# Patient Record
Sex: MALE | Race: WHITE | HISPANIC OR LATINO | Employment: OTHER | URBAN - METROPOLITAN AREA
[De-identification: names, ages, dates, MRNs, and addresses within clinical notes are randomized per-mention and may not be internally consistent; named-entity substitution may affect disease eponyms.]

---

## 2017-08-24 RX ORDER — TRAMADOL HYDROCHLORIDE 50 MG/1
TABLET ORAL
Qty: 30 TABLET | Refills: 0 | Status: SHIPPED | OUTPATIENT
Start: 2017-08-24 | End: 2017-08-30 | Stop reason: SDUPTHER

## 2017-08-30 RX ORDER — TRAMADOL HYDROCHLORIDE 50 MG/1
TABLET ORAL
Qty: 30 TABLET | Refills: 0 | Status: SHIPPED | OUTPATIENT
Start: 2017-08-30 | End: 2017-09-05 | Stop reason: SDUPTHER

## 2017-09-06 RX ORDER — TRAMADOL HYDROCHLORIDE 50 MG/1
TABLET ORAL
Qty: 30 TABLET | Refills: 0 | Status: SHIPPED | OUTPATIENT
Start: 2017-09-06 | End: 2017-09-11 | Stop reason: SDUPTHER

## 2017-09-12 RX ORDER — TRAMADOL HYDROCHLORIDE 50 MG/1
TABLET ORAL
Qty: 30 TABLET | Refills: 1 | Status: SHIPPED | OUTPATIENT
Start: 2017-09-12 | End: 2017-09-22 | Stop reason: SDUPTHER

## 2017-09-22 ENCOUNTER — OFFICE VISIT (OUTPATIENT)
Dept: PRIMARY CARE CLINIC | Facility: CLINIC | Age: 67
End: 2017-09-22
Payer: MEDICARE

## 2017-09-22 VITALS
RESPIRATION RATE: 18 BRPM | WEIGHT: 181 LBS | DIASTOLIC BLOOD PRESSURE: 70 MMHG | BODY MASS INDEX: 28.41 KG/M2 | TEMPERATURE: 98 F | HEART RATE: 73 BPM | OXYGEN SATURATION: 98 % | HEIGHT: 67 IN | SYSTOLIC BLOOD PRESSURE: 126 MMHG

## 2017-09-22 DIAGNOSIS — E78.2 MIXED HYPERLIPIDEMIA: ICD-10-CM

## 2017-09-22 DIAGNOSIS — R73.03 BORDERLINE DIABETES: ICD-10-CM

## 2017-09-22 DIAGNOSIS — G44.219 EPISODIC TENSION-TYPE HEADACHE, NOT INTRACTABLE: ICD-10-CM

## 2017-09-22 DIAGNOSIS — K21.9 GASTROESOPHAGEAL REFLUX DISEASE, ESOPHAGITIS PRESENCE NOT SPECIFIED: ICD-10-CM

## 2017-09-22 DIAGNOSIS — M51.36 DDD (DEGENERATIVE DISC DISEASE), LUMBAR: Primary | ICD-10-CM

## 2017-09-22 DIAGNOSIS — I10 ESSENTIAL HYPERTENSION, BENIGN: ICD-10-CM

## 2017-09-22 DIAGNOSIS — Z23 NEED FOR VACCINATION: ICD-10-CM

## 2017-09-22 DIAGNOSIS — F32.A DEPRESSION, UNSPECIFIED DEPRESSION TYPE: ICD-10-CM

## 2017-09-22 PROBLEM — M51.369 DDD (DEGENERATIVE DISC DISEASE), LUMBAR: Status: ACTIVE | Noted: 2017-09-22

## 2017-09-22 PROBLEM — G89.21 CHRONIC PAIN DUE TO TRAUMA: Status: ACTIVE | Noted: 2017-09-22

## 2017-09-22 PROBLEM — M50.30 DDD (DEGENERATIVE DISC DISEASE), CERVICAL: Status: ACTIVE | Noted: 2017-09-22

## 2017-09-22 PROCEDURE — 99214 OFFICE O/P EST MOD 30 MIN: CPT | Mod: S$GLB,,, | Performed by: FAMILY MEDICINE

## 2017-09-22 PROCEDURE — 3008F BODY MASS INDEX DOCD: CPT | Mod: S$GLB,,, | Performed by: FAMILY MEDICINE

## 2017-09-22 PROCEDURE — 99999 PR PBB SHADOW E&M-EST. PATIENT-LVL III: CPT | Mod: PBBFAC,,, | Performed by: FAMILY MEDICINE

## 2017-09-22 RX ORDER — LISINOPRIL 10 MG/1
10 TABLET ORAL DAILY
Qty: 90 TABLET | Refills: 1 | Status: SHIPPED | OUTPATIENT
Start: 2017-09-22 | End: 2018-04-18 | Stop reason: SDUPTHER

## 2017-09-22 RX ORDER — BUTALBITAL, ACETAMINOPHEN AND CAFFEINE 300; 40; 50 MG/1; MG/1; MG/1
1 CAPSULE ORAL EVERY 6 HOURS PRN
COMMUNITY
End: 2017-09-22 | Stop reason: CLARIF

## 2017-09-22 RX ORDER — LISINOPRIL 10 MG/1
10 TABLET ORAL DAILY
COMMUNITY
End: 2017-09-22 | Stop reason: SDUPTHER

## 2017-09-22 RX ORDER — ESCITALOPRAM OXALATE 10 MG/1
10 TABLET ORAL DAILY
Qty: 90 TABLET | Refills: 1 | Status: SHIPPED | OUTPATIENT
Start: 2017-09-22 | End: 2018-04-18 | Stop reason: SDUPTHER

## 2017-09-22 RX ORDER — BUTALBITAL, ACETAMINOPHEN AND CAFFEINE 50; 325; 40 MG/1; MG/1; MG/1
1 TABLET ORAL EVERY 6 HOURS PRN
COMMUNITY
End: 2017-09-22 | Stop reason: SDUPTHER

## 2017-09-22 RX ORDER — OMEPRAZOLE 40 MG/1
40 CAPSULE, DELAYED RELEASE ORAL DAILY
COMMUNITY
End: 2017-09-22 | Stop reason: SDUPTHER

## 2017-09-22 RX ORDER — ESCITALOPRAM OXALATE 10 MG/1
10 TABLET ORAL DAILY
COMMUNITY
End: 2017-09-22 | Stop reason: SDUPTHER

## 2017-09-22 RX ORDER — OMEPRAZOLE 40 MG/1
40 CAPSULE, DELAYED RELEASE ORAL DAILY
Qty: 90 CAPSULE | Refills: 1 | Status: SHIPPED | OUTPATIENT
Start: 2017-09-22 | End: 2018-02-06 | Stop reason: SDUPTHER

## 2017-09-22 RX ORDER — TRAMADOL HYDROCHLORIDE 50 MG/1
50 TABLET ORAL EVERY 6 HOURS PRN
Qty: 120 TABLET | Refills: 5 | Status: SHIPPED | OUTPATIENT
Start: 2017-09-22 | End: 2018-03-09 | Stop reason: SDUPTHER

## 2017-09-22 RX ORDER — ROSUVASTATIN CALCIUM 10 MG/1
10 TABLET, COATED ORAL DAILY
COMMUNITY
End: 2017-09-22 | Stop reason: SDUPTHER

## 2017-09-22 RX ORDER — ROSUVASTATIN CALCIUM 10 MG/1
10 TABLET, COATED ORAL DAILY
Qty: 90 TABLET | Refills: 1 | Status: SHIPPED | OUTPATIENT
Start: 2017-09-22 | End: 2018-04-18 | Stop reason: SDUPTHER

## 2017-09-22 RX ORDER — BUTALBITAL, ACETAMINOPHEN AND CAFFEINE 300; 40; 50 MG/1; MG/1; MG/1
1 CAPSULE ORAL EVERY 6 HOURS PRN
Qty: 30 CAPSULE | Refills: 2 | Status: CANCELLED | OUTPATIENT
Start: 2017-09-22

## 2017-09-22 RX ORDER — BUTALBITAL, ACETAMINOPHEN AND CAFFEINE 50; 325; 40 MG/1; MG/1; MG/1
1 TABLET ORAL EVERY 6 HOURS PRN
Qty: 45 TABLET | Refills: 3 | Status: SHIPPED | OUTPATIENT
Start: 2017-09-22 | End: 2018-02-05 | Stop reason: SDUPTHER

## 2017-09-22 NOTE — PROGRESS NOTES
"Subjective:       Patient ID: Bay Yadav is a 66 y.o. male.    Chief Complaint: Medication Refill    Chronic neck and back pain s/p old work-related injury. Used to be on chronic opiates, has weaned down to tramadol. Stable on 3-4 tramadol a day, still sometimes takes Tylenol for breakthrough pain  Gets occasional tension HA's, takes prn Fioricet  HTN - BP well controlled  HLD - tolerating meds w/o difficulty      Review of Systems   Constitutional: Negative for chills, fatigue and fever.   HENT: Negative for congestion.    Eyes: Negative for visual disturbance.   Respiratory: Negative for cough and shortness of breath.    Cardiovascular: Negative for chest pain.   Gastrointestinal: Negative for abdominal pain, nausea and vomiting.   Genitourinary: Negative for difficulty urinating.   Musculoskeletal: Positive for arthralgias and back pain.   Skin: Negative for rash.   Neurological: Negative for dizziness.   Psychiatric/Behavioral: Negative for sleep disturbance.       Objective:      Vitals:    09/22/17 0815   BP: 126/70   BP Location: Left arm   Patient Position: Sitting   BP Method: Large (Automatic)   Pulse: 73   Resp: 18   Temp: 98.3 °F (36.8 °C)   TempSrc: Oral   SpO2: 98%   Weight: 82.1 kg (181 lb)   Height: 5' 7" (1.702 m)     Physical Exam   Constitutional: He is oriented to person, place, and time. He appears well-developed and well-nourished.   HENT:   Head: Normocephalic and atraumatic.   Cardiovascular: Normal rate, regular rhythm and normal heart sounds.    Pulmonary/Chest: Effort normal and breath sounds normal.   Musculoskeletal: He exhibits no edema.   Neurological: He is alert and oriented to person, place, and time.   Skin: Skin is warm and dry.   Vitals reviewed.      Assessment:       1. DDD (degenerative disc disease), lumbar    2. Essential hypertension, benign    3. Mixed hyperlipidemia    4. Borderline diabetes    5. Gastroesophageal reflux disease, esophagitis presence not specified  "   6. Depression, unspecified depression type    7. Episodic tension-type headache, not intractable    8. Need for vaccination        Plan:       DDD (degenerative disc disease), lumbar  -     tramadol (ULTRAM) 50 mg tablet; Take 1 tablet (50 mg total) by mouth every 6 (six) hours as needed.  Dispense: 120 tablet; Refill: 5    Essential hypertension, benign  -     lisinopril 10 MG tablet; Take 1 tablet (10 mg total) by mouth once daily.  Dispense: 90 tablet; Refill: 1  -     CBC auto differential; Future; Expected date: 03/22/2018    Mixed hyperlipidemia  -     rosuvastatin (CRESTOR) 10 MG tablet; Take 1 tablet (10 mg total) by mouth once daily.  Dispense: 90 tablet; Refill: 1  -     Comprehensive metabolic panel; Future; Expected date: 03/22/2018  -     Lipid panel; Future; Expected date: 03/22/2018    Borderline diabetes  -     Hemoglobin A1c; Future; Expected date: 03/22/2018    Gastroesophageal reflux disease, esophagitis presence not specified  -     omeprazole (PRILOSEC) 40 MG capsule; Take 1 capsule (40 mg total) by mouth once daily.  Dispense: 90 capsule; Refill: 1    Depression, unspecified depression type  -     escitalopram oxalate (LEXAPRO) 10 MG tablet; Take 1 tablet (10 mg total) by mouth once daily.  Dispense: 90 tablet; Refill: 1    Episodic tension-type headache, not intractable  -     butalbital-acetaminophen-caffeine -40 mg (FIORICET, ESGIC) -40 mg per tablet; Take 1 tablet by mouth every 6 (six) hours as needed for Headaches.  Dispense: 45 tablet; Refill: 3    Need for vaccination  Comments:  refused flu and pneumonia vaccines    Other orders  -     Cancel: butalbital-acetaminophen-caff -40 mg Cap; Take 1 capsule by mouth every 6 (six) hours as needed.  Dispense: 30 capsule; Refill: 2      Medication List with Changes/Refills   Changed and/or Refilled Medications    Modified Medication Previous Medication    BUTALBITAL-ACETAMINOPHEN-CAFFEINE -40 MG (FIORICET, ESGIC)  -40 MG PER TABLET butalbital-acetaminophen-caffeine -40 mg (FIORICET, ESGIC) -40 mg per tablet       Take 1 tablet by mouth every 6 (six) hours as needed for Headaches.    Take 1 tablet by mouth every 6 (six) hours as needed.    ESCITALOPRAM OXALATE (LEXAPRO) 10 MG TABLET escitalopram oxalate (LEXAPRO) 10 MG tablet       Take 1 tablet (10 mg total) by mouth once daily.    Take 10 mg by mouth once daily.    LISINOPRIL 10 MG TABLET lisinopril 10 MG tablet       Take 1 tablet (10 mg total) by mouth once daily.    Take 10 mg by mouth once daily.    OMEPRAZOLE (PRILOSEC) 40 MG CAPSULE omeprazole (PRILOSEC) 40 MG capsule       Take 1 capsule (40 mg total) by mouth once daily.    Take 40 mg by mouth once daily.    ROSUVASTATIN (CRESTOR) 10 MG TABLET rosuvastatin (CRESTOR) 10 MG tablet       Take 1 tablet (10 mg total) by mouth once daily.    Take 10 mg by mouth once daily.    TRAMADOL (ULTRAM) 50 MG TABLET tramadol (ULTRAM) 50 mg tablet       Take 1 tablet (50 mg total) by mouth every 6 (six) hours as needed.    TAKE 1 TABLET BY MOUTH EVERY 6 HOURS AS NEEDED FOR PAIN   Discontinued Medications    BUTALBITAL-ACETAMINOPHEN-CAFF -40 MG CAP    Take 1 capsule by mouth every 6 (six) hours as needed.

## 2017-09-25 RX ORDER — TRAMADOL HYDROCHLORIDE 50 MG/1
TABLET ORAL
Qty: 30 TABLET | Refills: 0 | OUTPATIENT
Start: 2017-09-25

## 2017-10-02 ENCOUNTER — TELEPHONE (OUTPATIENT)
Dept: PRIMARY CARE CLINIC | Facility: CLINIC | Age: 67
End: 2017-10-02

## 2017-10-02 ENCOUNTER — OFFICE VISIT (OUTPATIENT)
Dept: PRIMARY CARE CLINIC | Facility: CLINIC | Age: 67
End: 2017-10-02
Payer: MEDICARE

## 2017-10-02 VITALS
OXYGEN SATURATION: 99 % | HEART RATE: 80 BPM | TEMPERATURE: 98 F | HEIGHT: 67 IN | SYSTOLIC BLOOD PRESSURE: 131 MMHG | WEIGHT: 181 LBS | DIASTOLIC BLOOD PRESSURE: 58 MMHG | BODY MASS INDEX: 28.41 KG/M2 | RESPIRATION RATE: 18 BRPM

## 2017-10-02 DIAGNOSIS — J01.00 ACUTE NON-RECURRENT MAXILLARY SINUSITIS: Primary | ICD-10-CM

## 2017-10-02 PROCEDURE — 3008F BODY MASS INDEX DOCD: CPT | Mod: S$GLB,,, | Performed by: FAMILY MEDICINE

## 2017-10-02 PROCEDURE — 96372 THER/PROPH/DIAG INJ SC/IM: CPT | Mod: S$GLB,,, | Performed by: FAMILY MEDICINE

## 2017-10-02 PROCEDURE — 99213 OFFICE O/P EST LOW 20 MIN: CPT | Mod: 25,S$GLB,, | Performed by: FAMILY MEDICINE

## 2017-10-02 PROCEDURE — 99999 PR PBB SHADOW E&M-EST. PATIENT-LVL III: CPT | Mod: PBBFAC,,, | Performed by: FAMILY MEDICINE

## 2017-10-02 RX ORDER — BETAMETHASONE SODIUM PHOSPHATE AND BETAMETHASONE ACETATE 3; 3 MG/ML; MG/ML
12 INJECTION, SUSPENSION INTRA-ARTICULAR; INTRALESIONAL; INTRAMUSCULAR; SOFT TISSUE
Status: COMPLETED | OUTPATIENT
Start: 2017-10-02 | End: 2017-10-02

## 2017-10-02 RX ORDER — AMOXICILLIN AND CLAVULANATE POTASSIUM 875; 125 MG/1; MG/1
1 TABLET, FILM COATED ORAL EVERY 12 HOURS
Qty: 20 TABLET | Refills: 0 | Status: SHIPPED | OUTPATIENT
Start: 2017-10-02 | End: 2017-10-12

## 2017-10-02 RX ORDER — HYDROCODONE BITARTRATE AND HOMATROPINE METHYLBROMIDE ORAL SOLUTION 5; 1.5 MG/5ML; MG/5ML
5 LIQUID ORAL EVERY 6 HOURS PRN
Qty: 180 ML | Refills: 0 | Status: SHIPPED | OUTPATIENT
Start: 2017-10-02 | End: 2018-03-20

## 2017-10-02 RX ADMIN — BETAMETHASONE SODIUM PHOSPHATE AND BETAMETHASONE ACETATE 12 MG: 3; 3 INJECTION, SUSPENSION INTRA-ARTICULAR; INTRALESIONAL; INTRAMUSCULAR; SOFT TISSUE at 11:10

## 2017-10-02 NOTE — PROGRESS NOTES
"Subjective:       Patient ID: Bay Yadav is a 66 y.o. male.    Chief Complaint: Cough and Sore Throat    Sinusitis   This is a new problem. The current episode started 1 to 4 weeks ago. The problem is unchanged. The maximum temperature recorded prior to his arrival was 100.4 - 100.9 F. The fever has been present for less than 1 day. Associated symptoms include chills, congestion, coughing, headaches, sinus pressure and a sore throat. Pertinent negatives include no shortness of breath. Past treatments include oral decongestants. The treatment provided no relief.     Review of Systems   Constitutional: Positive for chills.   HENT: Positive for congestion, sinus pressure and sore throat.    Respiratory: Positive for cough. Negative for shortness of breath.    Neurological: Positive for headaches.       Objective:      Vitals:    10/02/17 0952   BP: (!) 131/58   BP Location: Left arm   Patient Position: Sitting   BP Method: Large (Automatic)   Pulse: 80   Resp: 18   Temp: 97.9 °F (36.6 °C)   TempSrc: Oral   SpO2: 99%   Weight: 82.1 kg (181 lb)   Height: 5' 7" (1.702 m)     Physical Exam   Constitutional: He is oriented to person, place, and time. He appears well-developed and well-nourished.   HENT:   Head: Normocephalic and atraumatic.   Right Ear: Tympanic membrane normal.   Left Ear: Tympanic membrane normal.   Nose: Right sinus exhibits maxillary sinus tenderness. Left sinus exhibits maxillary sinus tenderness.   Mouth/Throat: Oropharynx is clear and moist and mucous membranes are normal.   Cardiovascular: Normal rate, regular rhythm and normal heart sounds.    Pulmonary/Chest: Effort normal and breath sounds normal.   Musculoskeletal: He exhibits no edema.   Neurological: He is alert and oriented to person, place, and time.   Skin: Skin is warm and dry.   Vitals reviewed.      Assessment:       1. Acute non-recurrent maxillary sinusitis        Plan:       Acute non-recurrent maxillary sinusitis  -     " betamethasone acetate-betamethasone sodium phosphate injection 12 mg; Inject 2 mLs (12 mg total) into the muscle one time.  -     amoxicillin-clavulanate 875-125mg (AUGMENTIN) 875-125 mg per tablet; Take 1 tablet by mouth every 12 (twelve) hours.  Dispense: 20 tablet; Refill: 0  -     hydrocodone-homatropine 5-1.5 mg/5 ml (HYCODAN) 5-1.5 mg/5 mL Syrp; Take 5 mLs by mouth every 6 (six) hours as needed (cough).  Dispense: 180 mL; Refill: 0      Medication List with Changes/Refills   New Medications    AMOXICILLIN-CLAVULANATE 875-125MG (AUGMENTIN) 875-125 MG PER TABLET    Take 1 tablet by mouth every 12 (twelve) hours.    HYDROCODONE-HOMATROPINE 5-1.5 MG/5 ML (HYCODAN) 5-1.5 MG/5 ML SYRP    Take 5 mLs by mouth every 6 (six) hours as needed (cough).   Current Medications    BUTALBITAL-ACETAMINOPHEN-CAFFEINE -40 MG (FIORICET, ESGIC) -40 MG PER TABLET    Take 1 tablet by mouth every 6 (six) hours as needed for Headaches.    ESCITALOPRAM OXALATE (LEXAPRO) 10 MG TABLET    Take 1 tablet (10 mg total) by mouth once daily.    LISINOPRIL 10 MG TABLET    Take 1 tablet (10 mg total) by mouth once daily.    OMEPRAZOLE (PRILOSEC) 40 MG CAPSULE    Take 1 capsule (40 mg total) by mouth once daily.    ROSUVASTATIN (CRESTOR) 10 MG TABLET    Take 1 tablet (10 mg total) by mouth once daily.    TRAMADOL (ULTRAM) 50 MG TABLET    Take 1 tablet (50 mg total) by mouth every 6 (six) hours as needed.

## 2017-10-02 NOTE — TELEPHONE ENCOUNTER
----- Message from Yuly Solomon sent at 10/2/2017  8:25 AM CDT -----  Contact: Wife  Gabby, wife [phone], Calling for same day appointment, sore throat, coughing, nasal congestion. Please advise Thanks.

## 2017-10-02 NOTE — PROGRESS NOTES
Celestone 12mg IM given left gluteal w/aseptic technique.  No adverse reaction noted, tolerated well. Voiced no complaints.

## 2017-12-07 NOTE — TELEPHONE ENCOUNTER
----- Message from Yuly Roberts sent at 12/7/2017 12:51 PM CST -----  Call pt at [phone]  Asking for refill on meclozine 25 mgs   [facility] [id number] [address] [facility] [address] [address]  Phone: [phone] Fax: [phone]

## 2017-12-08 RX ORDER — MECLIZINE HYDROCHLORIDE 25 MG/1
25 TABLET ORAL 3 TIMES DAILY PRN
Qty: 30 TABLET | Refills: 2 | Status: SHIPPED | OUTPATIENT
Start: 2017-12-08 | End: 2019-10-27 | Stop reason: SDUPTHER

## 2018-02-05 DIAGNOSIS — G44.219 EPISODIC TENSION-TYPE HEADACHE, NOT INTRACTABLE: ICD-10-CM

## 2018-02-05 DIAGNOSIS — K21.9 GASTROESOPHAGEAL REFLUX DISEASE, ESOPHAGITIS PRESENCE NOT SPECIFIED: ICD-10-CM

## 2018-02-06 RX ORDER — OMEPRAZOLE 40 MG/1
40 CAPSULE, DELAYED RELEASE ORAL DAILY
Qty: 90 CAPSULE | Refills: 1 | Status: SHIPPED | OUTPATIENT
Start: 2018-02-06 | End: 2018-09-10 | Stop reason: SDUPTHER

## 2018-02-06 RX ORDER — BUTALBITAL, ACETAMINOPHEN AND CAFFEINE 50; 325; 40 MG/1; MG/1; MG/1
TABLET ORAL
Qty: 45 TABLET | Refills: 0 | Status: SHIPPED | OUTPATIENT
Start: 2018-02-06 | End: 2018-02-27 | Stop reason: SDUPTHER

## 2018-02-06 NOTE — TELEPHONE ENCOUNTER
----- Message from Jane Shanti sent at 2/6/2018 12:55 PM CST -----  Contact: Bay  Patient is requesting refill Rx omeprazole (PRILOSEC) 40 MG capsule 90-day supply to be sent to     [id number] - [place]  [address] AT [facility] [address]  [address] [address]  Phone: [phone] Fax: [phone]    Out of medication. Thanks!

## 2018-02-27 DIAGNOSIS — G44.219 EPISODIC TENSION-TYPE HEADACHE, NOT INTRACTABLE: ICD-10-CM

## 2018-02-27 RX ORDER — BUTALBITAL, ACETAMINOPHEN AND CAFFEINE 50; 325; 40 MG/1; MG/1; MG/1
TABLET ORAL
Qty: 45 TABLET | Refills: 2 | Status: SHIPPED | OUTPATIENT
Start: 2018-02-27 | End: 2018-09-30 | Stop reason: SDUPTHER

## 2018-03-09 DIAGNOSIS — M51.36 DDD (DEGENERATIVE DISC DISEASE), LUMBAR: ICD-10-CM

## 2018-03-12 RX ORDER — TRAMADOL HYDROCHLORIDE 50 MG/1
TABLET ORAL
Qty: 120 TABLET | Refills: 0 | Status: SHIPPED | OUTPATIENT
Start: 2018-03-12 | End: 2018-03-20 | Stop reason: SDUPTHER

## 2018-03-20 ENCOUNTER — OFFICE VISIT (OUTPATIENT)
Dept: PRIMARY CARE CLINIC | Facility: CLINIC | Age: 68
End: 2018-03-20
Payer: MEDICARE

## 2018-03-20 VITALS
WEIGHT: 178 LBS | RESPIRATION RATE: 18 BRPM | SYSTOLIC BLOOD PRESSURE: 114 MMHG | BODY MASS INDEX: 26.98 KG/M2 | TEMPERATURE: 98 F | DIASTOLIC BLOOD PRESSURE: 72 MMHG | OXYGEN SATURATION: 97 % | HEART RATE: 85 BPM | HEIGHT: 68 IN

## 2018-03-20 DIAGNOSIS — R73.03 BORDERLINE DIABETES: ICD-10-CM

## 2018-03-20 DIAGNOSIS — M19.012 OSTEOARTHRITIS OF GLENOHUMERAL JOINT, LEFT: Primary | ICD-10-CM

## 2018-03-20 DIAGNOSIS — M51.36 DDD (DEGENERATIVE DISC DISEASE), LUMBAR: ICD-10-CM

## 2018-03-20 DIAGNOSIS — E78.2 MIXED HYPERLIPIDEMIA: ICD-10-CM

## 2018-03-20 PROCEDURE — 99999 PR PBB SHADOW E&M-EST. PATIENT-LVL III: CPT | Mod: PBBFAC,,, | Performed by: FAMILY MEDICINE

## 2018-03-20 PROCEDURE — 3074F SYST BP LT 130 MM HG: CPT | Mod: S$GLB,,, | Performed by: FAMILY MEDICINE

## 2018-03-20 PROCEDURE — 20610 DRAIN/INJ JOINT/BURSA W/O US: CPT | Mod: LT,S$GLB,, | Performed by: FAMILY MEDICINE

## 2018-03-20 PROCEDURE — 99214 OFFICE O/P EST MOD 30 MIN: CPT | Mod: 25,S$GLB,, | Performed by: FAMILY MEDICINE

## 2018-03-20 PROCEDURE — 3078F DIAST BP <80 MM HG: CPT | Mod: S$GLB,,, | Performed by: FAMILY MEDICINE

## 2018-03-20 RX ORDER — TRAMADOL HYDROCHLORIDE 50 MG/1
50 TABLET ORAL EVERY 6 HOURS PRN
Qty: 120 TABLET | Refills: 2 | Status: SHIPPED | OUTPATIENT
Start: 2018-03-20 | End: 2018-07-01 | Stop reason: SDUPTHER

## 2018-03-20 RX ORDER — METHYLPREDNISOLONE ACETATE 40 MG/ML
40 INJECTION, SUSPENSION INTRA-ARTICULAR; INTRALESIONAL; INTRAMUSCULAR; SOFT TISSUE
Status: DISCONTINUED | OUTPATIENT
Start: 2018-03-20 | End: 2018-03-20 | Stop reason: HOSPADM

## 2018-03-20 RX ADMIN — METHYLPREDNISOLONE ACETATE 40 MG: 40 INJECTION, SUSPENSION INTRA-ARTICULAR; INTRALESIONAL; INTRAMUSCULAR; SOFT TISSUE at 05:03

## 2018-03-20 NOTE — PROCEDURES
Large Joint Aspiration/Injection  Date/Time: 3/20/2018 5:08 PM  Performed by: CULLEN AMANDA  Authorized by: CULLEN AMANDA     Consent Done?:  Yes (Verbal)  Indications:  Pain  Procedure site marked: Yes    Timeout: Prior to procedure the correct patient, procedure, and site was verified      Location:  Shoulder  Site:  L glenohumeral  Prep: Patient was prepped and draped in usual sterile fashion    Ultrasonic Guidance for needle placement: No  Needle size:  22 G  Approach:  Posterior  Medications:  40 mg methylPREDNISolone acetate 40 mg/mL  Patient tolerance:  Patient tolerated the procedure well with no immediate complications

## 2018-03-20 NOTE — PROGRESS NOTES
"Subjective:       Patient ID: Bay Yadav is a 67 y.o. male.    Chief Complaint: Follow-up (Patient says he is here for his 6 month check up and medication refill) and Shoulder Pain (Patient says he has had left shouler pain for months )    Left shoulder pain off and on x 3 years. Has never been formally evaluated, has just self-limited his activity, but lately pain getting worse, has hard time getting comfortable. Pain worse with overhead movements.  Recent labs remarkable for elevated TG's. Has been back to drinking regular root beer ~3 months ago, has been drinking ~3 per day (down from 6 previously)      Review of Systems   Constitutional: Negative for chills, fatigue and fever.   HENT: Negative for congestion.    Eyes: Negative for visual disturbance.   Respiratory: Negative for cough and shortness of breath.    Cardiovascular: Negative for chest pain.   Gastrointestinal: Negative for abdominal pain, nausea and vomiting.   Genitourinary: Negative for difficulty urinating.   Musculoskeletal: Positive for arthralgias and back pain.   Skin: Negative for rash.   Neurological: Negative for dizziness.   Psychiatric/Behavioral: Negative for sleep disturbance.       Objective:      Vitals:    03/20/18 1352   BP: 114/72   BP Location: Left arm   Patient Position: Sitting   BP Method: Large (Automatic)   Pulse: 85   Resp: 18   Temp: 98.2 °F (36.8 °C)   TempSrc: Oral   SpO2: 97%   Weight: 80.7 kg (178 lb)   Height: 5' 8" (1.727 m)     Lab Results   Component Value Date    WBC 7.20 03/13/2018    HGB 15.0 03/13/2018    HCT 44.3 03/13/2018     03/13/2018    CHOL 207 (H) 03/13/2018    TRIG 220 (H) 03/13/2018    HDL 61 (H) 03/13/2018    ALT 29 03/13/2018    AST 22 03/13/2018     03/13/2018    K 4.9 03/13/2018     03/13/2018    CREATININE 1.4 (H) 03/13/2018    BUN 17 03/13/2018    CO2 29 (H) 03/13/2018    INR 1.0 07/21/2006    HGBA1C 5.4 03/13/2018     Physical Exam   Constitutional: He is oriented to " person, place, and time. He appears well-developed and well-nourished.   HENT:   Head: Normocephalic and atraumatic.   Eyes: EOM are normal.   Neck: Neck supple. No JVD present.   Cardiovascular: Normal rate, regular rhythm and normal heart sounds.    Pulmonary/Chest: Effort normal and breath sounds normal.   Musculoskeletal: He exhibits no edema.        Left shoulder: He exhibits tenderness (posterolaterally), crepitus, pain and abnormal pulse. He exhibits no swelling, no effusion, no deformity and normal strength.   Neurological: He is alert and oriented to person, place, and time.   Skin: Skin is warm and dry.   Psychiatric: He has a normal mood and affect. His behavior is normal.   Nursing note and vitals reviewed.      Assessment:       1. Osteoarthritis of glenohumeral joint, left    2. DDD (degenerative disc disease), lumbar    3. Mixed hyperlipidemia    4. Borderline diabetes        Plan:       Osteoarthritis of glenohumeral joint, left  -     X-Ray Shoulder 2 or More Views Left; Future; Expected date: 03/20/2018  -     Large Joint Aspiration/Injection  -     methylPREDNISolone acetate injection 40 mg; Inject 40 mg into the articular space.    DDD (degenerative disc disease), lumbar  -     traMADol (ULTRAM) 50 mg tablet; Take 1 tablet (50 mg total) by mouth every 6 (six) hours as needed.  Dispense: 120 tablet; Refill: 2    Mixed hyperlipidemia  Comments:  TG's should improve with reduced carbohydrate intake  Orders:  -     Comprehensive metabolic panel; Future; Expected date: 06/20/2018  -     Lipid panel; Future; Expected date: 06/20/2018    Borderline diabetes  Comments:  stressed importance of cutting carb intake  Orders:  -     Hemoglobin A1c; Future; Expected date: 06/20/2018      Medication List with Changes/Refills   Current Medications    BUTALBITAL-ACETAMINOPHEN-CAFFEINE -40 MG (FIORICET, ESGIC) -40 MG PER TABLET    TAKE 1 TABLET BY MOUTH EVERY 6 HOURS AS NEEDED FOR HEADACHES     ESCITALOPRAM OXALATE (LEXAPRO) 10 MG TABLET    Take 1 tablet (10 mg total) by mouth once daily.    LISINOPRIL 10 MG TABLET    Take 1 tablet (10 mg total) by mouth once daily.    MECLIZINE (ANTIVERT) 25 MG TABLET    Take 1 tablet (25 mg total) by mouth 3 (three) times daily as needed.    OMEPRAZOLE (PRILOSEC) 40 MG CAPSULE    Take 1 capsule (40 mg total) by mouth once daily.    ROSUVASTATIN (CRESTOR) 10 MG TABLET    Take 1 tablet (10 mg total) by mouth once daily.   Changed and/or Refilled Medications    Modified Medication Previous Medication    TRAMADOL (ULTRAM) 50 MG TABLET traMADol (ULTRAM) 50 mg tablet       Take 1 tablet (50 mg total) by mouth every 6 (six) hours as needed.    TAKE 1 TABLET BY MOUTH EVERY 6 HOURS AS NEEDED   Discontinued Medications    HYDROCODONE-HOMATROPINE 5-1.5 MG/5 ML (HYCODAN) 5-1.5 MG/5 ML SYRP    Take 5 mLs by mouth every 6 (six) hours as needed (cough).

## 2018-04-18 DIAGNOSIS — I10 ESSENTIAL HYPERTENSION, BENIGN: ICD-10-CM

## 2018-04-18 DIAGNOSIS — F32.A DEPRESSION, UNSPECIFIED DEPRESSION TYPE: ICD-10-CM

## 2018-04-18 DIAGNOSIS — E78.2 MIXED HYPERLIPIDEMIA: ICD-10-CM

## 2018-04-19 RX ORDER — ESCITALOPRAM OXALATE 10 MG/1
TABLET ORAL
Qty: 90 TABLET | Refills: 3 | Status: SHIPPED | OUTPATIENT
Start: 2018-04-19 | End: 2019-04-19 | Stop reason: SDUPTHER

## 2018-04-19 RX ORDER — ROSUVASTATIN CALCIUM 10 MG/1
TABLET, COATED ORAL
Qty: 90 TABLET | Refills: 3 | Status: SHIPPED | OUTPATIENT
Start: 2018-04-19 | End: 2018-09-20

## 2018-04-19 RX ORDER — LISINOPRIL 10 MG/1
TABLET ORAL
Qty: 90 TABLET | Refills: 3 | Status: SHIPPED | OUTPATIENT
Start: 2018-04-19 | End: 2019-05-05 | Stop reason: SDUPTHER

## 2018-07-01 DIAGNOSIS — M51.36 DDD (DEGENERATIVE DISC DISEASE), LUMBAR: ICD-10-CM

## 2018-07-01 RX ORDER — TRAMADOL HYDROCHLORIDE 50 MG/1
TABLET ORAL
Qty: 120 TABLET | Refills: 1 | Status: SHIPPED | OUTPATIENT
Start: 2018-07-01 | End: 2018-08-30 | Stop reason: SDUPTHER

## 2018-08-30 DIAGNOSIS — M51.36 DDD (DEGENERATIVE DISC DISEASE), LUMBAR: ICD-10-CM

## 2018-08-31 RX ORDER — TRAMADOL HYDROCHLORIDE 50 MG/1
TABLET ORAL
Qty: 120 TABLET | Refills: 0 | Status: SHIPPED | OUTPATIENT
Start: 2018-08-31 | End: 2018-09-20 | Stop reason: SDUPTHER

## 2018-09-10 DIAGNOSIS — K21.9 GASTROESOPHAGEAL REFLUX DISEASE, ESOPHAGITIS PRESENCE NOT SPECIFIED: ICD-10-CM

## 2018-09-11 RX ORDER — OMEPRAZOLE 40 MG/1
CAPSULE, DELAYED RELEASE ORAL
Qty: 90 CAPSULE | Refills: 1 | Status: SHIPPED | OUTPATIENT
Start: 2018-09-11 | End: 2019-03-20 | Stop reason: SDUPTHER

## 2018-09-12 ENCOUNTER — TELEPHONE (OUTPATIENT)
Dept: PRIMARY CARE CLINIC | Facility: CLINIC | Age: 68
End: 2018-09-12

## 2018-09-12 NOTE — TELEPHONE ENCOUNTER
----- Message from Dionte Tillman sent at 9/12/2018 12:39 PM CDT -----  Type:  Patient Returning Call    Who Called:  Self Who Left Message for Patient:  CHRISTINA Does the patient know what this is regarding?:  CHRISTINA  Best Call Back Number:  [phone] Additional Information: CHRISTINA

## 2018-09-13 ENCOUNTER — CLINICAL SUPPORT (OUTPATIENT)
Dept: PRIMARY CARE CLINIC | Facility: CLINIC | Age: 68
End: 2018-09-13
Payer: MEDICARE

## 2018-09-13 DIAGNOSIS — E78.2 MIXED HYPERLIPIDEMIA: ICD-10-CM

## 2018-09-13 DIAGNOSIS — R73.03 BORDERLINE DIABETES: ICD-10-CM

## 2018-09-13 LAB
ALBUMIN SERPL BCP-MCNC: 4.2 G/DL
ALP SERPL-CCNC: 70 U/L
ALT SERPL W/O P-5'-P-CCNC: 31 U/L
ANION GAP SERPL CALC-SCNC: 7 MMOL/L
AST SERPL-CCNC: 24 U/L
BILIRUB SERPL-MCNC: 0.5 MG/DL
BUN SERPL-MCNC: 22 MG/DL
CALCIUM SERPL-MCNC: 9.4 MG/DL
CHLORIDE SERPL-SCNC: 105 MMOL/L
CHOLEST SERPL-MCNC: 192 MG/DL
CHOLEST/HDLC SERPL: 3.3 {RATIO}
CO2 SERPL-SCNC: 30 MMOL/L
CREAT SERPL-MCNC: 1.5 MG/DL
EST. GFR  (AFRICAN AMERICAN): 55.3 ML/MIN/1.73 M^2
EST. GFR  (NON AFRICAN AMERICAN): 47.8 ML/MIN/1.73 M^2
ESTIMATED AVG GLUCOSE: 108 MG/DL
GLUCOSE SERPL-MCNC: 103 MG/DL
HBA1C MFR BLD HPLC: 5.4 %
HDLC SERPL-MCNC: 58 MG/DL
HDLC SERPL: 30.2 %
LDLC SERPL CALC-MCNC: 90 MG/DL
NONHDLC SERPL-MCNC: 134 MG/DL
POTASSIUM SERPL-SCNC: 4.8 MMOL/L
PROT SERPL-MCNC: 7.2 G/DL
SODIUM SERPL-SCNC: 142 MMOL/L
TRIGL SERPL-MCNC: 218 MG/DL

## 2018-09-13 PROCEDURE — 83036 HEMOGLOBIN GLYCOSYLATED A1C: CPT

## 2018-09-13 PROCEDURE — 99999 PR PBB SHADOW E&M-EST. PATIENT-LVL I: CPT | Mod: PBBFAC,,,

## 2018-09-13 PROCEDURE — 99211 OFF/OP EST MAY X REQ PHY/QHP: CPT | Mod: PBBFAC,PN

## 2018-09-20 ENCOUNTER — OFFICE VISIT (OUTPATIENT)
Dept: PRIMARY CARE CLINIC | Facility: CLINIC | Age: 68
End: 2018-09-20
Payer: MEDICARE

## 2018-09-20 VITALS
SYSTOLIC BLOOD PRESSURE: 144 MMHG | OXYGEN SATURATION: 99 % | HEIGHT: 68 IN | RESPIRATION RATE: 16 BRPM | WEIGHT: 187 LBS | HEART RATE: 65 BPM | BODY MASS INDEX: 28.34 KG/M2 | DIASTOLIC BLOOD PRESSURE: 72 MMHG | TEMPERATURE: 98 F

## 2018-09-20 DIAGNOSIS — Z11.59 NEED FOR HEPATITIS C SCREENING TEST: ICD-10-CM

## 2018-09-20 DIAGNOSIS — Z12.11 COLON CANCER SCREENING: ICD-10-CM

## 2018-09-20 DIAGNOSIS — Z13.6 SCREENING FOR AAA (ABDOMINAL AORTIC ANEURYSM): ICD-10-CM

## 2018-09-20 DIAGNOSIS — M19.012 OSTEOARTHRITIS OF GLENOHUMERAL JOINT, LEFT: Primary | ICD-10-CM

## 2018-09-20 DIAGNOSIS — E78.2 MIXED HYPERLIPIDEMIA: ICD-10-CM

## 2018-09-20 DIAGNOSIS — Z23 NEED FOR VACCINATION: ICD-10-CM

## 2018-09-20 DIAGNOSIS — M51.36 DDD (DEGENERATIVE DISC DISEASE), LUMBAR: ICD-10-CM

## 2018-09-20 DIAGNOSIS — I10 ESSENTIAL HYPERTENSION, BENIGN: ICD-10-CM

## 2018-09-20 PROCEDURE — 99999 PR PBB SHADOW E&M-EST. PATIENT-LVL IV: CPT | Mod: PBBFAC,,, | Performed by: FAMILY MEDICINE

## 2018-09-20 PROCEDURE — 1101F PT FALLS ASSESS-DOCD LE1/YR: CPT | Mod: ,,, | Performed by: FAMILY MEDICINE

## 2018-09-20 PROCEDURE — 20605 DRAIN/INJ JOINT/BURSA W/O US: CPT | Mod: PBBFAC,PN | Performed by: FAMILY MEDICINE

## 2018-09-20 PROCEDURE — 99214 OFFICE O/P EST MOD 30 MIN: CPT | Mod: PBBFAC,PN | Performed by: FAMILY MEDICINE

## 2018-09-20 PROCEDURE — 99214 OFFICE O/P EST MOD 30 MIN: CPT | Mod: S$PBB,25,, | Performed by: FAMILY MEDICINE

## 2018-09-20 PROCEDURE — 3078F DIAST BP <80 MM HG: CPT | Mod: ,,, | Performed by: FAMILY MEDICINE

## 2018-09-20 PROCEDURE — 3077F SYST BP >= 140 MM HG: CPT | Mod: ,,, | Performed by: FAMILY MEDICINE

## 2018-09-20 RX ORDER — ROSUVASTATIN CALCIUM 20 MG/1
20 TABLET, COATED ORAL DAILY
Qty: 90 TABLET | Refills: 1 | Status: SHIPPED | OUTPATIENT
Start: 2018-09-20 | End: 2019-04-19 | Stop reason: SDUPTHER

## 2018-09-20 RX ORDER — METHYLPREDNISOLONE ACETATE 40 MG/ML
40 INJECTION, SUSPENSION INTRA-ARTICULAR; INTRALESIONAL; INTRAMUSCULAR; SOFT TISSUE
Status: DISCONTINUED | OUTPATIENT
Start: 2018-09-20 | End: 2018-09-20 | Stop reason: HOSPADM

## 2018-09-20 RX ORDER — TRAMADOL HYDROCHLORIDE 50 MG/1
50 TABLET ORAL EVERY 6 HOURS PRN
Qty: 120 TABLET | Refills: 3 | Status: SHIPPED | OUTPATIENT
Start: 2018-09-20 | End: 2019-01-22 | Stop reason: SDUPTHER

## 2018-09-20 RX ADMIN — METHYLPREDNISOLONE ACETATE 40 MG: 40 INJECTION, SUSPENSION INTRALESIONAL; INTRAMUSCULAR; INTRASYNOVIAL; SOFT TISSUE at 09:09

## 2018-09-20 NOTE — PROGRESS NOTES
"Subjective:       Patient ID: Bay Yadav is a 67 y.o. male.    Chief Complaint: Hyperlipidemia (patient is here to discuss lab results) and Shoulder Pain (patient wants a steriod injection in left shoulder )    Recent labs reviewed.  Triglycerides still elevated.  Compliant with medications, eating and drinking better than he had been previously.  Hypertension-blood pressure is generally well controlled, compliant with medications  Left shoulder arthritis worsening, requesting another cortisone injection.  Last shot approximately 6 months ago gave about 4 months of pain relief.      Review of Systems   Constitutional: Negative for fever.   HENT: Negative for trouble swallowing.    Eyes: Negative for visual disturbance.   Respiratory: Negative for shortness of breath.    Cardiovascular: Negative for chest pain.   Gastrointestinal: Negative for blood in stool, nausea and vomiting.   Genitourinary: Negative for difficulty urinating.   Musculoskeletal: Positive for arthralgias and back pain.   Skin: Negative for rash.   Neurological: Negative for dizziness and weakness.   Hematological: Does not bruise/bleed easily.   Psychiatric/Behavioral: Negative for agitation and confusion.       Objective:      Vitals:    09/20/18 0912   BP: (!) 144/72   BP Location: Left arm   Patient Position: Sitting   BP Method: Medium (Automatic)   Pulse: 65   Resp: 16   Temp: 98 °F (36.7 °C)   TempSrc: Oral   SpO2: 99%   Weight: 84.8 kg (187 lb)   Height: 5' 8" (1.727 m)     Physical Exam   Constitutional: He is oriented to person, place, and time. He appears well-developed and well-nourished.   HENT:   Head: Normocephalic and atraumatic.   Eyes: EOM are normal.   Neck: Neck supple. No JVD present.   Cardiovascular: Normal rate, regular rhythm and normal heart sounds.   Pulmonary/Chest: Effort normal and breath sounds normal.   Musculoskeletal: He exhibits no edema.        Left shoulder: He exhibits tenderness (posterolaterally), " crepitus and pain. He exhibits no swelling, no effusion, no deformity and normal strength.   Neurological: He is alert and oriented to person, place, and time.   Skin: Skin is warm and dry.   Psychiatric: He has a normal mood and affect. His behavior is normal.   Nursing note and vitals reviewed.      Assessment:       1. Osteoarthritis of glenohumeral joint, left    2. Mixed hyperlipidemia    3. Essential hypertension, benign    4. Colon cancer screening    5. Need for vaccination    6. Need for hepatitis C screening test    7. Screening for AAA (abdominal aortic aneurysm)    8. DDD (degenerative disc disease), lumbar        Plan:       Osteoarthritis of glenohumeral joint, left  -     Intermediate Joint Aspiration/Injection  -     methylPREDNISolone acetate injection 40 mg; Inject 40 mg into the articular space.    Mixed hyperlipidemia  Comments:  Suboptimally controlled, increase Crestor  Orders:  -     rosuvastatin (CRESTOR) 20 MG tablet; Take 1 tablet (20 mg total) by mouth once daily.  Dispense: 90 tablet; Refill: 1  -     Comprehensive metabolic panel; Future; Expected date: 12/20/2018  -     Lipid panel; Future; Expected date: 12/20/2018    Essential hypertension, benign  Comments:  Continue current meds for now    Colon cancer screening  Comments:  done last December  Orders:  -     Cancel: Ambulatory referral to Colorectal Surgery    Need for vaccination  Comments:  refuses all vaccinations  Orders:  -     Cancel: Pneumococcal Conjugate Vaccine (13 Valent) (IM)  -     Discontinue: varicella-zoster [id number], PF, (SHINGRIX, PF,) 50 mcg/0.5 mL injection; Inject 0.5 mLs into the muscle once. for 1 dose  Dispense: 0.5 mL; Refill: 0    Need for hepatitis C screening test  -     Hepatitis C antibody; Future; Expected date: 12/20/2018    Screening for AAA (abdominal aortic aneurysm)  -     US Abdominal Aorta; Future; Expected date: 09/20/2018    DDD (degenerative disc disease), lumbar  -     traMADol (ULTRAM) 50  mg tablet; Take 1 tablet (50 mg total) by mouth every 6 (six) hours as needed.  Dispense: 120 tablet; Refill: 3         Medication List           Accurate as of 9/20/18  9:45 AM. If you have any questions, ask your nurse or doctor.               CHANGE how you take these medications    rosuvastatin 20 MG tablet  Commonly known as:  CRESTOR  Take 1 tablet (20 mg total) by mouth once daily.  What changed:    · medication strength  · See the new instructions.  Changed by:  Wayne Brennan MD     traMADol 50 mg tablet  Commonly known as:  ULTRAM  Take 1 tablet (50 mg total) by mouth every 6 (six) hours as needed.  What changed:  See the new instructions.  Changed by:  Wayne Brennan MD        CONTINUE taking these medications    butalbital-acetaminophen-caffeine -40 mg -40 mg per tablet  Commonly known as:  FIORICET, ESGIC  TAKE 1 TABLET BY MOUTH EVERY 6 HOURS AS NEEDED FOR HEADACHES     escitalopram oxalate 10 MG tablet  Commonly known as:  LEXAPRO  TAKE 1 TABLET(10 MG) BY MOUTH EVERY DAY     lisinopril 10 MG tablet  TAKE 1 TABLET(10 MG) BY MOUTH EVERY DAY     meclizine 25 mg tablet  Commonly known as:  ANTIVERT  Take 1 tablet (25 mg total) by mouth 3 (three) times daily as needed.     omeprazole 40 MG capsule  Commonly known as:  PRILOSEC  TAKE 1 CAPSULE(40 MG) BY MOUTH EVERY DAY           Where to Get Your Medications      [id number]  [address] AT [facility] [address] [address]    Phone:  [phone]   · rosuvastatin 20 MG tablet  · traMADol 50 mg tablet

## 2018-09-20 NOTE — PROCEDURES
Intermediate Joint Aspiration/Injection  Date/Time: 9/20/2018 9:44 AM  Performed by: Wayne Brennan MD  Authorized by: Wayne Brennan MD     Consent Done?:  Yes (Verbal)  Indications:  Pain  Site marked: The procedure site was marked    Timeout: Prior to procedure the correct patient, procedure, and site was verified      Location:  Shoulder  Acromioclavicular joint: left glenohumeral joint.  Prep: Patient was prepped and draped in usual sterile fashion    Ultrasonic Guidance for needle placement: No  Needle size:  22 G  Approach:  Posterior  Medications:  40 mg methylPREDNISolone acetate 40 mg/mL  Patient tolerance:  Patient tolerated the procedure well with no immediate complications

## 2018-09-30 DIAGNOSIS — G44.219 EPISODIC TENSION-TYPE HEADACHE, NOT INTRACTABLE: ICD-10-CM

## 2018-10-01 RX ORDER — BUTALBITAL, ACETAMINOPHEN AND CAFFEINE 50; 325; 40 MG/1; MG/1; MG/1
TABLET ORAL
Qty: 45 TABLET | Refills: 3 | Status: SHIPPED | OUTPATIENT
Start: 2018-10-01 | End: 2019-01-28 | Stop reason: SDUPTHER

## 2018-11-09 ENCOUNTER — PES CALL (OUTPATIENT)
Dept: ADMINISTRATIVE | Facility: CLINIC | Age: 68
End: 2018-11-09

## 2019-01-22 DIAGNOSIS — M51.36 DDD (DEGENERATIVE DISC DISEASE), LUMBAR: ICD-10-CM

## 2019-01-22 RX ORDER — TRAMADOL HYDROCHLORIDE 50 MG/1
TABLET ORAL
Qty: 120 TABLET | Refills: 0 | Status: SHIPPED | OUTPATIENT
Start: 2019-01-22 | End: 2019-02-20 | Stop reason: SDUPTHER

## 2019-01-28 DIAGNOSIS — G44.219 EPISODIC TENSION-TYPE HEADACHE, NOT INTRACTABLE: ICD-10-CM

## 2019-01-28 RX ORDER — BUTALBITAL, ACETAMINOPHEN AND CAFFEINE 50; 325; 40 MG/1; MG/1; MG/1
TABLET ORAL
Qty: 45 TABLET | Refills: 1 | Status: SHIPPED | OUTPATIENT
Start: 2019-01-28 | End: 2019-03-19 | Stop reason: SDUPTHER

## 2019-02-20 DIAGNOSIS — M51.36 DDD (DEGENERATIVE DISC DISEASE), LUMBAR: ICD-10-CM

## 2019-02-20 RX ORDER — TRAMADOL HYDROCHLORIDE 50 MG/1
TABLET ORAL
Qty: 120 TABLET | Refills: 0 | Status: SHIPPED | OUTPATIENT
Start: 2019-02-20 | End: 2019-03-19 | Stop reason: SDUPTHER

## 2019-03-14 DIAGNOSIS — E87.5 HYPERKALEMIA: Primary | ICD-10-CM

## 2019-03-19 ENCOUNTER — CLINICAL SUPPORT (OUTPATIENT)
Dept: PRIMARY CARE CLINIC | Facility: CLINIC | Age: 69
End: 2019-03-19
Payer: MEDICARE

## 2019-03-19 ENCOUNTER — OFFICE VISIT (OUTPATIENT)
Dept: PRIMARY CARE CLINIC | Facility: CLINIC | Age: 69
End: 2019-03-19
Payer: MEDICARE

## 2019-03-19 VITALS
DIASTOLIC BLOOD PRESSURE: 74 MMHG | HEART RATE: 73 BPM | TEMPERATURE: 99 F | SYSTOLIC BLOOD PRESSURE: 138 MMHG | WEIGHT: 185.5 LBS | OXYGEN SATURATION: 97 % | HEIGHT: 67 IN | BODY MASS INDEX: 29.11 KG/M2 | RESPIRATION RATE: 18 BRPM

## 2019-03-19 DIAGNOSIS — Z13.6 ENCOUNTER FOR ABDOMINAL AORTIC ANEURYSM (AAA) SCREENING: ICD-10-CM

## 2019-03-19 DIAGNOSIS — E87.5 HYPERKALEMIA: ICD-10-CM

## 2019-03-19 DIAGNOSIS — G44.219 EPISODIC TENSION-TYPE HEADACHE, NOT INTRACTABLE: ICD-10-CM

## 2019-03-19 DIAGNOSIS — M51.36 DDD (DEGENERATIVE DISC DISEASE), LUMBAR: Primary | ICD-10-CM

## 2019-03-19 LAB
ANION GAP SERPL CALC-SCNC: 16 MMOL/L
BUN SERPL-MCNC: 12 MG/DL
CALCIUM SERPL-MCNC: 9.1 MG/DL
CHLORIDE SERPL-SCNC: 100 MMOL/L
CO2 SERPL-SCNC: 25 MMOL/L
CREAT SERPL-MCNC: 1.1 MG/DL
EST. GFR  (AFRICAN AMERICAN): >60 ML/MIN/1.73 M^2
EST. GFR  (NON AFRICAN AMERICAN): >60 ML/MIN/1.73 M^2
GLUCOSE SERPL-MCNC: 98 MG/DL
POTASSIUM SERPL-SCNC: 4.5 MMOL/L
SODIUM SERPL-SCNC: 141 MMOL/L

## 2019-03-19 PROCEDURE — 1101F PR PT FALLS ASSESS DOC 0-1 FALLS W/OUT INJ PAST YR: ICD-10-PCS | Mod: S$GLB,,, | Performed by: FAMILY MEDICINE

## 2019-03-19 PROCEDURE — 3075F SYST BP GE 130 - 139MM HG: CPT | Mod: S$GLB,,, | Performed by: FAMILY MEDICINE

## 2019-03-19 PROCEDURE — 80048 BASIC METABOLIC PNL TOTAL CA: CPT

## 2019-03-19 PROCEDURE — 93010 EKG 12-LEAD: ICD-10-PCS | Mod: S$GLB,,, | Performed by: INTERNAL MEDICINE

## 2019-03-19 PROCEDURE — 3078F PR MOST RECENT DIASTOLIC BLOOD PRESSURE < 80 MM HG: ICD-10-PCS | Mod: S$GLB,,, | Performed by: FAMILY MEDICINE

## 2019-03-19 PROCEDURE — 99214 PR OFFICE/OUTPT VISIT, EST, LEVL IV, 30-39 MIN: ICD-10-PCS | Mod: S$GLB,,, | Performed by: FAMILY MEDICINE

## 2019-03-19 PROCEDURE — 93005 ELECTROCARDIOGRAM TRACING: CPT | Mod: S$GLB,,, | Performed by: FAMILY MEDICINE

## 2019-03-19 PROCEDURE — 99999 PR PBB SHADOW E&M-EST. PATIENT-LVL IV: ICD-10-PCS | Mod: PBBFAC,,, | Performed by: FAMILY MEDICINE

## 2019-03-19 PROCEDURE — 3075F PR MOST RECENT SYSTOLIC BLOOD PRESS GE 130-139MM HG: ICD-10-PCS | Mod: S$GLB,,, | Performed by: FAMILY MEDICINE

## 2019-03-19 PROCEDURE — 3078F DIAST BP <80 MM HG: CPT | Mod: S$GLB,,, | Performed by: FAMILY MEDICINE

## 2019-03-19 PROCEDURE — 99214 OFFICE O/P EST MOD 30 MIN: CPT | Mod: S$GLB,,, | Performed by: FAMILY MEDICINE

## 2019-03-19 PROCEDURE — 93010 ELECTROCARDIOGRAM REPORT: CPT | Mod: S$GLB,,, | Performed by: INTERNAL MEDICINE

## 2019-03-19 PROCEDURE — 1101F PT FALLS ASSESS-DOCD LE1/YR: CPT | Mod: S$GLB,,, | Performed by: FAMILY MEDICINE

## 2019-03-19 PROCEDURE — 93005 EKG 12-LEAD: ICD-10-PCS | Mod: S$GLB,,, | Performed by: FAMILY MEDICINE

## 2019-03-19 PROCEDURE — 99999 PR PBB SHADOW E&M-EST. PATIENT-LVL IV: CPT | Mod: PBBFAC,,, | Performed by: FAMILY MEDICINE

## 2019-03-19 RX ORDER — HYDROCODONE BITARTRATE AND HOMATROPINE METHYLBROMIDE ORAL SOLUTION 5; 1.5 MG/5ML; MG/5ML
5 LIQUID ORAL EVERY 6 HOURS PRN
Qty: 120 ML | Refills: 0 | Status: SHIPPED | OUTPATIENT
Start: 2019-03-19 | End: 2020-08-10

## 2019-03-19 RX ORDER — TRAMADOL HYDROCHLORIDE 50 MG/1
50 TABLET ORAL EVERY 6 HOURS PRN
Qty: 120 TABLET | Refills: 2 | Status: SHIPPED | OUTPATIENT
Start: 2019-03-19 | End: 2019-06-13 | Stop reason: SDUPTHER

## 2019-03-19 RX ORDER — BUTALBITAL, ACETAMINOPHEN AND CAFFEINE 50; 325; 40 MG/1; MG/1; MG/1
TABLET ORAL
Qty: 45 TABLET | Refills: 2 | Status: SHIPPED | OUTPATIENT
Start: 2019-03-19 | End: 2019-05-05 | Stop reason: SDUPTHER

## 2019-03-19 NOTE — PROGRESS NOTES
"Subjective:       Patient ID: Bay Yadav is a 68 y.o. male.    Chief Complaint: Follow-up (6 month follow up)    Potassium slightly elevated on recent labs.  Not taking any potassium supplementation.  Chronic neck and lower back pain, stable on current regimen.  Occasional tension headaches, takes Fioricet as needed, no adverse side effects.      Review of Systems   Constitutional: Negative for chills, fatigue and fever.   HENT: Negative for congestion.    Eyes: Negative for visual disturbance.   Respiratory: Positive for cough. Negative for shortness of breath.    Cardiovascular: Negative for chest pain.   Gastrointestinal: Negative for abdominal pain, nausea and vomiting.   Genitourinary: Negative for difficulty urinating.   Musculoskeletal: Positive for arthralgias and back pain.   Skin: Negative for rash.   Allergic/Immunologic: Negative for immunocompromised state.   Neurological: Negative for dizziness.   Hematological: Does not bruise/bleed easily.   Psychiatric/Behavioral: Negative for sleep disturbance.       Objective:      Vitals:    03/19/19 1410   BP: 138/74   BP Location: Left arm   Patient Position: Sitting   BP Method: Medium (Automatic)   Pulse: 73   Resp: 18   Temp: 98.5 °F (36.9 °C)   TempSrc: Oral   SpO2: 97%   Weight: 84.1 kg (185 lb 8 oz)   Height: 5' 7" (1.702 m)     Physical Exam   Constitutional: He is oriented to person, place, and time. He appears well-developed and well-nourished.   HENT:   Head: Normocephalic and atraumatic.   Cardiovascular: Normal rate, regular rhythm and normal heart sounds.   Pulmonary/Chest: Effort normal and breath sounds normal.   Musculoskeletal: He exhibits no edema.        Lumbar back: He exhibits decreased range of motion. He exhibits no bony tenderness and no deformity.   Neurological: He is alert and oriented to person, place, and time.   Skin: Skin is warm and dry.   Nursing note and vitals reviewed.      Assessment:       1. DDD (degenerative disc " disease), lumbar    2. Episodic tension-type headache, not intractable    3. Encounter for abdominal aortic aneurysm (AAA) screening    4. Hyperkalemia        Plan:       DDD (degenerative disc disease), lumbar  -     traMADol (ULTRAM) 50 mg tablet; Take 1 tablet (50 mg total) by mouth every 6 (six) hours as needed.  Dispense: 120 tablet; Refill: 2  Stable  Episodic tension-type headache, not intractable  -     butalbital-acetaminophen-caffeine -40 mg (FIORICET, ESGIC) -40 mg per tablet; TAKE 1 TABLET BY MOUTH EVERY 6 HOURS AS NEEDED FOR HEADACHES  Dispense: 45 tablet; Refill: 2  Stable  Encounter for abdominal aortic aneurysm (AAA) screening  -     US Abdominal Aorta; Future; Expected date: 03/19/2019    Hyperkalemia  No acute EKG changes  Other orders  -     hydrocodone-homatropine 5-1.5 mg/5 ml (HYCODAN) 5-1.5 mg/5 mL Syrp; Take 5 mLs by mouth every 6 (six) hours as needed (cough).  Dispense: 120 mL; Refill: 0      Medication List with Changes/Refills   Current Medications    ESCITALOPRAM OXALATE (LEXAPRO) 10 MG TABLET    TAKE 1 TABLET(10 MG) BY MOUTH EVERY DAY    LISINOPRIL 10 MG TABLET    TAKE 1 TABLET(10 MG) BY MOUTH EVERY DAY    MECLIZINE (ANTIVERT) 25 MG TABLET    Take 1 tablet (25 mg total) by mouth 3 (three) times daily as needed.    OMEPRAZOLE (PRILOSEC) 40 MG CAPSULE    TAKE 1 CAPSULE(40 MG) BY MOUTH EVERY DAY    ROSUVASTATIN (CRESTOR) 20 MG TABLET    Take 1 tablet (20 mg total) by mouth once daily.   Changed and/or Refilled Medications    Modified Medication Previous Medication    BUTALBITAL-ACETAMINOPHEN-CAFFEINE -40 MG (FIORICET, ESGIC) -40 MG PER TABLET butalbital-acetaminophen-caffeine -40 mg (FIORICET, ESGIC) -40 mg per tablet       TAKE 1 TABLET BY MOUTH EVERY 6 HOURS AS NEEDED FOR HEADACHES    TAKE 1 TABLET BY MOUTH EVERY 6 HOURS AS NEEDED FOR HEADACHES    HYDROCODONE-HOMATROPINE 5-1.5 MG/5 ML (HYCODAN) 5-1.5 MG/5 ML SYRP hydrocodone-homatropine 5-1.5 mg/5 ml  (HYCODAN) 5-1.5 mg/5 mL Syrp       Take 5 mLs by mouth every 6 (six) hours as needed (cough).    Take 5 mLs by mouth every 6 (six) hours as needed (cough).    TRAMADOL (ULTRAM) 50 MG TABLET traMADol (ULTRAM) 50 mg tablet       Take 1 tablet (50 mg total) by mouth every 6 (six) hours as needed.    TAKE 1 TABLET BY MOUTH EVERY 6 HOURS AS NEEDED FOR PAIN

## 2019-03-20 DIAGNOSIS — K21.9 GASTROESOPHAGEAL REFLUX DISEASE, ESOPHAGITIS PRESENCE NOT SPECIFIED: ICD-10-CM

## 2019-03-20 RX ORDER — OMEPRAZOLE 40 MG/1
CAPSULE, DELAYED RELEASE ORAL
Qty: 90 CAPSULE | Refills: 1 | Status: SHIPPED | OUTPATIENT
Start: 2019-03-20 | End: 2019-10-27 | Stop reason: SDUPTHER

## 2019-03-26 PROBLEM — I70.0 ATHEROSCLEROSIS OF ABDOMINAL AORTA: Status: ACTIVE | Noted: 2019-03-26

## 2019-04-19 DIAGNOSIS — E78.2 MIXED HYPERLIPIDEMIA: ICD-10-CM

## 2019-04-19 DIAGNOSIS — F32.A DEPRESSION, UNSPECIFIED DEPRESSION TYPE: ICD-10-CM

## 2019-04-22 RX ORDER — ESCITALOPRAM OXALATE 10 MG/1
TABLET ORAL
Qty: 90 TABLET | Refills: 3 | Status: SHIPPED | OUTPATIENT
Start: 2019-04-22 | End: 2020-03-27

## 2019-04-22 RX ORDER — ROSUVASTATIN CALCIUM 20 MG/1
TABLET, COATED ORAL
Qty: 90 TABLET | Refills: 3 | Status: SHIPPED | OUTPATIENT
Start: 2019-04-22 | End: 2020-03-27

## 2019-05-05 DIAGNOSIS — I10 ESSENTIAL HYPERTENSION, BENIGN: ICD-10-CM

## 2019-05-05 DIAGNOSIS — G44.219 EPISODIC TENSION-TYPE HEADACHE, NOT INTRACTABLE: ICD-10-CM

## 2019-05-06 RX ORDER — BUTALBITAL, ACETAMINOPHEN AND CAFFEINE 50; 325; 40 MG/1; MG/1; MG/1
TABLET ORAL
Qty: 45 TABLET | Refills: 1 | Status: SHIPPED | OUTPATIENT
Start: 2019-05-06 | End: 2019-06-20 | Stop reason: SDUPTHER

## 2019-05-06 RX ORDER — LISINOPRIL 10 MG/1
TABLET ORAL
Qty: 90 TABLET | Refills: 1 | Status: SHIPPED | OUTPATIENT
Start: 2019-05-06 | End: 2019-11-07 | Stop reason: SDUPTHER

## 2019-06-13 DIAGNOSIS — M51.36 DDD (DEGENERATIVE DISC DISEASE), LUMBAR: ICD-10-CM

## 2019-06-15 RX ORDER — TRAMADOL HYDROCHLORIDE 50 MG/1
TABLET ORAL
Qty: 120 TABLET | Refills: 1 | Status: SHIPPED | OUTPATIENT
Start: 2019-06-15 | End: 2019-08-14 | Stop reason: SDUPTHER

## 2019-06-20 DIAGNOSIS — G44.219 EPISODIC TENSION-TYPE HEADACHE, NOT INTRACTABLE: ICD-10-CM

## 2019-06-21 RX ORDER — BUTALBITAL, ACETAMINOPHEN AND CAFFEINE 50; 325; 40 MG/1; MG/1; MG/1
TABLET ORAL
Qty: 45 TABLET | Refills: 1 | Status: SHIPPED | OUTPATIENT
Start: 2019-06-21 | End: 2019-07-28 | Stop reason: SDUPTHER

## 2019-07-28 DIAGNOSIS — G44.219 EPISODIC TENSION-TYPE HEADACHE, NOT INTRACTABLE: ICD-10-CM

## 2019-07-29 RX ORDER — BUTALBITAL, ACETAMINOPHEN AND CAFFEINE 50; 325; 40 MG/1; MG/1; MG/1
TABLET ORAL
Qty: 45 TABLET | Refills: 1 | Status: SHIPPED | OUTPATIENT
Start: 2019-07-29 | End: 2019-09-24 | Stop reason: SDUPTHER

## 2019-08-14 DIAGNOSIS — M51.36 DDD (DEGENERATIVE DISC DISEASE), LUMBAR: ICD-10-CM

## 2019-08-14 RX ORDER — TRAMADOL HYDROCHLORIDE 50 MG/1
TABLET ORAL
Qty: 120 TABLET | Refills: 0 | Status: SHIPPED | OUTPATIENT
Start: 2019-08-14 | End: 2019-09-12 | Stop reason: SDUPTHER

## 2019-09-12 DIAGNOSIS — M51.36 DDD (DEGENERATIVE DISC DISEASE), LUMBAR: ICD-10-CM

## 2019-09-12 RX ORDER — TRAMADOL HYDROCHLORIDE 50 MG/1
TABLET ORAL
Qty: 120 TABLET | Refills: 0 | Status: SHIPPED | OUTPATIENT
Start: 2019-09-12 | End: 2019-10-10 | Stop reason: SDUPTHER

## 2019-09-17 ENCOUNTER — CLINICAL SUPPORT (OUTPATIENT)
Dept: PRIMARY CARE CLINIC | Facility: CLINIC | Age: 69
End: 2019-09-17
Payer: MEDICARE

## 2019-09-17 ENCOUNTER — OFFICE VISIT (OUTPATIENT)
Dept: UROLOGY | Facility: CLINIC | Age: 69
End: 2019-09-17
Payer: MEDICARE

## 2019-09-17 VITALS
DIASTOLIC BLOOD PRESSURE: 77 MMHG | BODY MASS INDEX: 27.19 KG/M2 | HEIGHT: 68 IN | HEART RATE: 79 BPM | SYSTOLIC BLOOD PRESSURE: 113 MMHG | WEIGHT: 179.44 LBS

## 2019-09-17 DIAGNOSIS — E87.5 HYPERKALEMIA: Primary | ICD-10-CM

## 2019-09-17 DIAGNOSIS — I10 ESSENTIAL HYPERTENSION, BENIGN: ICD-10-CM

## 2019-09-17 DIAGNOSIS — N20.0 NEPHROLITHIASIS: ICD-10-CM

## 2019-09-17 DIAGNOSIS — R10.2 PELVIC PAIN: Primary | ICD-10-CM

## 2019-09-17 DIAGNOSIS — R73.03 BORDERLINE DIABETES: ICD-10-CM

## 2019-09-17 DIAGNOSIS — C61 CANCER OF PROSTATE WITH INTERMEDIATE RECURRENCE RISK (STAGE T2B-C OR GLEASON 7 OR PSA 10-20): ICD-10-CM

## 2019-09-17 DIAGNOSIS — E78.2 MIXED HYPERLIPIDEMIA: ICD-10-CM

## 2019-09-17 LAB
ALBUMIN SERPL BCP-MCNC: 4.3 G/DL (ref 3.5–5.2)
ALP SERPL-CCNC: 76 U/L (ref 38–126)
ALT SERPL W/O P-5'-P-CCNC: 34 U/L (ref 17–63)
ANION GAP SERPL CALC-SCNC: 8 MMOL/L (ref 8–16)
AST SERPL-CCNC: 23 U/L (ref 15–41)
BASOPHILS # BLD AUTO: 0 K/UL (ref 0–0.2)
BASOPHILS NFR BLD: 0.4 % (ref 0–1.9)
BILIRUB SERPL-MCNC: 0.5 MG/DL (ref 0.3–1.2)
BUN SERPL-MCNC: 16 MG/DL (ref 8–23)
CALCIUM SERPL-MCNC: 9.3 MG/DL (ref 8.6–10)
CHLORIDE SERPL-SCNC: 102 MMOL/L (ref 101–111)
CHOLEST SERPL-MCNC: 162 MG/DL (ref 80–200)
CHOLEST/HDLC SERPL: 2.7 {RATIO} (ref 2–5)
CO2 SERPL-SCNC: 29 MMOL/L (ref 23–29)
CREAT SERPL-MCNC: 1.3 MG/DL (ref 0.5–1.4)
DIFFERENTIAL METHOD: ABNORMAL
EOSINOPHIL # BLD AUTO: 0.1 K/UL (ref 0–0.5)
EOSINOPHIL NFR BLD: 1.1 % (ref 0–8)
ERYTHROCYTE [DISTWIDTH] IN BLOOD BY AUTOMATED COUNT: 13.6 % (ref 11.5–14.5)
EST. GFR  (AFRICAN AMERICAN): >60 ML/MIN/1.73 M^2
EST. GFR  (NON AFRICAN AMERICAN): 56.5 ML/MIN/1.73 M^2
ESTIMATED AVG GLUCOSE: 108 MG/DL (ref 68–131)
GLUCOSE SERPL-MCNC: 102 MG/DL (ref 74–118)
HBA1C MFR BLD HPLC: 5.4 % (ref 4–5.6)
HCT VFR BLD AUTO: 41.1 % (ref 40–54)
HDLC SERPL-MCNC: 61 MG/DL (ref 40–75)
HDLC SERPL: 37.7 % (ref 20–50)
HGB BLD-MCNC: 13.5 G/DL (ref 14–18)
LDLC SERPL CALC-MCNC: 76 MG/DL
LYMPHOCYTES # BLD AUTO: 2.2 K/UL (ref 1–4.8)
LYMPHOCYTES NFR BLD: 23.2 % (ref 18–48)
MCH RBC QN AUTO: 29.7 PG (ref 27–31)
MCHC RBC AUTO-ENTMCNC: 32.9 G/DL (ref 32–36)
MCV RBC AUTO: 90 FL (ref 82–98)
MONOCYTES # BLD AUTO: 0.7 K/UL (ref 0.3–1)
MONOCYTES NFR BLD: 7.1 % (ref 4–15)
NEUTROPHILS # BLD AUTO: 6.6 K/UL (ref 1.8–7.7)
NEUTROPHILS NFR BLD: 68.2 % (ref 38–73)
NONHDLC SERPL-MCNC: 101 MG/DL
PLATELET # BLD AUTO: 287 K/UL (ref 150–350)
PMV BLD AUTO: 8.8 FL (ref 9.2–12.9)
POTASSIUM SERPL-SCNC: 4.3 MMOL/L (ref 3.5–5.1)
PROT SERPL-MCNC: 7.5 G/DL (ref 6–8.4)
RBC # BLD AUTO: 4.55 M/UL (ref 4.6–6.2)
SODIUM SERPL-SCNC: 139 MMOL/L (ref 136–145)
TRIGL SERPL-MCNC: 127 MG/DL (ref 30–150)
WBC # BLD AUTO: 9.7 K/UL (ref 3.9–12.7)

## 2019-09-17 PROCEDURE — 80053 COMPREHEN METABOLIC PANEL: CPT

## 2019-09-17 PROCEDURE — 3078F PR MOST RECENT DIASTOLIC BLOOD PRESSURE < 80 MM HG: ICD-10-PCS | Mod: S$GLB,,, | Performed by: UROLOGY

## 2019-09-17 PROCEDURE — 1101F PR PT FALLS ASSESS DOC 0-1 FALLS W/OUT INJ PAST YR: ICD-10-PCS | Mod: S$GLB,,, | Performed by: UROLOGY

## 2019-09-17 PROCEDURE — 36415 COLL VENOUS BLD VENIPUNCTURE: CPT | Mod: S$GLB,,, | Performed by: FAMILY MEDICINE

## 2019-09-17 PROCEDURE — 83036 HEMOGLOBIN GLYCOSYLATED A1C: CPT

## 2019-09-17 PROCEDURE — 87798 DETECT AGENT NOS DNA AMP: CPT | Mod: 91

## 2019-09-17 PROCEDURE — 87077 CULTURE AEROBIC IDENTIFY: CPT

## 2019-09-17 PROCEDURE — 80061 LIPID PANEL: CPT

## 2019-09-17 PROCEDURE — 3074F SYST BP LT 130 MM HG: CPT | Mod: S$GLB,,, | Performed by: UROLOGY

## 2019-09-17 PROCEDURE — 99999 PR PBB SHADOW E&M-EST. PATIENT-LVL III: CPT | Mod: PBBFAC,,, | Performed by: UROLOGY

## 2019-09-17 PROCEDURE — 85025 COMPLETE CBC W/AUTO DIFF WBC: CPT

## 2019-09-17 PROCEDURE — 99999 PR PBB SHADOW E&M-EST. PATIENT-LVL III: ICD-10-PCS | Mod: PBBFAC,,, | Performed by: UROLOGY

## 2019-09-17 PROCEDURE — 87088 URINE BACTERIA CULTURE: CPT

## 2019-09-17 PROCEDURE — 3074F PR MOST RECENT SYSTOLIC BLOOD PRESSURE < 130 MM HG: ICD-10-PCS | Mod: S$GLB,,, | Performed by: UROLOGY

## 2019-09-17 PROCEDURE — 36415 PR COLLECTION VENOUS BLOOD,VENIPUNCTURE: ICD-10-PCS | Mod: S$GLB,,, | Performed by: FAMILY MEDICINE

## 2019-09-17 PROCEDURE — 99205 PR OFFICE/OUTPT VISIT, NEW, LEVL V, 60-74 MIN: ICD-10-PCS | Mod: S$GLB,,, | Performed by: UROLOGY

## 2019-09-17 PROCEDURE — 87186 SC STD MICRODIL/AGAR DIL: CPT

## 2019-09-17 PROCEDURE — 3078F DIAST BP <80 MM HG: CPT | Mod: S$GLB,,, | Performed by: UROLOGY

## 2019-09-17 PROCEDURE — 87798 DETECT AGENT NOS DNA AMP: CPT

## 2019-09-17 PROCEDURE — 99205 OFFICE O/P NEW HI 60 MIN: CPT | Mod: S$GLB,,, | Performed by: UROLOGY

## 2019-09-17 PROCEDURE — 1101F PT FALLS ASSESS-DOCD LE1/YR: CPT | Mod: S$GLB,,, | Performed by: UROLOGY

## 2019-09-17 PROCEDURE — 87086 URINE CULTURE/COLONY COUNT: CPT

## 2019-09-17 NOTE — PROGRESS NOTES
Subjective:       Patient ID: Bay Yadav is a 68 y.o. male.    Chief Complaint: Prostate Cancer (with pain in the groin and penis. Patient has records. Patient is here to estalish care.) and Nephrolithiasis (history of kidney stones)    Bay Yadav is a 68 y.o. Male here for discussion of prostate cancer and urinary issues.     8/21/19 prostate biopsy showed   Left base/Left mid/Left apex soni 6, 5% focal perineural invasion  Right base/right mid was negative  Right apex 3+4=7, 2 cores 50%, grade 2. Focal perineural invasion    psa 2.71  psa 3.35    He has testicular pain. Pain is severe.   Long history of kidney stones. Ultrasound a couple of years ago.   He has urinary issues. He had a cystoscopy 1.5 years ago.   Has some chronic pelvic pain. Pain worse with urination, not relieved with urination.   He has a split urinary stream, intermittency, hesitancy.   Nocturia x 2. He does drink coffee overnight.   Urinary frequency every 2 hours.   No incontinence.   Was put on flomax and he stopped it.   He told him to take azo.   No gross hematuria.     He was given antibiotics twice for 2 weeks.     H/o peyronies and previous injections    He drinks a lot of coffee.     No weight loss.     No family hx of prostate cancer.   Brother has brain cancer.   19 yo granddaughter had thyroid cancer.   Father and brother had throat cancer.   Mother had lung cancer.   Grandmother and grandfather on paternal side had colon cancer.     Has polyps on last colonoscopy. Due 12/20 for repeat colonoscopy.     Has erections, but is sexually active.           Past Surgical History:   Procedure Laterality Date    EYE SURGERY         Past Medical History:   Diagnosis Date    Acid reflux     Cervical disc herniation     Depression     Heart murmur     Hyperlipidemia     Lumbar disc herniation     Nephrolithiasis     Upper GI bleed        Social History     Socioeconomic History    Marital status:      Spouse  name: Not on file    Number of children: Not on file    Years of education: Not on file    Highest education level: Not on file   Occupational History    Not on file   Social Needs    Financial resource strain: Not on file    Food insecurity:     Worry: Not on file     Inability: Not on file    Transportation needs:     Medical: Not on file     Non-medical: Not on file   Tobacco Use    Smoking status: Former Smoker    Smokeless tobacco: Never Used   Substance and Sexual Activity    Alcohol use: No    Drug use: No    Sexual activity: Not on file   Lifestyle    Physical activity:     Days per week: Not on file     Minutes per session: Not on file    Stress: Not on file   Relationships    Social connections:     Talks on phone: Not on file     Gets together: Not on file     Attends [employer] service: Not on file     Active member of club or organization: Not on file     Attends meetings of clubs or organizations: Not on file     Relationship status: Not on file   Other Topics Concern    Not on file   Social History Narrative    Not on file       Family History   Problem Relation Age of Onset    Cancer Mother     Cancer Father     Cancer Brother        Current Outpatient Medications   Medication Sig Dispense Refill    butalbital-acetaminophen-caffeine -40 mg (FIORICET, ESGIC) -40 mg per tablet TAKE 1 TABLET BY MOUTH EVERY 6 HOURS AS NEEDED FOR HEADACHES 45 tablet 1    escitalopram oxalate (LEXAPRO) 10 MG tablet TAKE 1 TABLET(10 MG) BY MOUTH EVERY DAY 90 tablet 3    lisinopril 10 MG tablet TAKE 1 TABLET(10 MG) BY MOUTH EVERY DAY 90 tablet 1    meclizine (ANTIVERT) 25 mg tablet Take 1 tablet (25 mg total) by mouth 3 (three) times daily as needed. 30 tablet 2    omeprazole (PRILOSEC) 40 MG capsule TAKE 1 CAPSULE(40 MG) BY MOUTH EVERY DAY 90 capsule 1    rosuvastatin (CRESTOR) 20 MG tablet TAKE 1 TABLET(20 MG) BY MOUTH EVERY DAY 90 tablet 3    traMADol (ULTRAM) 50 mg tablet TAKE 1  TABLET BY MOUTH EVERY 6 HOURS AS NEEDED 120 tablet 0    vitB6/mag cit,ox/potassium cit (THERALITH XR ORAL) Take by mouth.      hydrocodone-homatropine 5-1.5 mg/5 ml (HYCODAN) 5-1.5 mg/5 mL Syrp Take 5 mLs by mouth every 6 (six) hours as needed (cough). 120 mL 0     No current facility-administered medications for this visit.        Review of patient's allergies indicates:   Allergen Reactions    Asa [aspirin]      Gastro bleed        BMP  Lab Results   Component Value Date     09/17/2019    K 4.3 09/17/2019     09/17/2019    CO2 29 09/17/2019    BUN 16 09/17/2019    CREATININE 1.3 09/17/2019    CALCIUM 9.3 09/17/2019    ANIONGAP 8 09/17/2019    ESTGFRAFRICA >60.0 09/17/2019    EGFRNONAA 56.5 (A) 09/17/2019       Review of Systems   Constitutional: Negative for chills, fatigue, fever and unexpected weight change.   HENT: Negative for hearing loss and nosebleeds.    Eyes: Negative for visual disturbance.   Respiratory: Negative for chest tightness and shortness of breath.    Cardiovascular: Negative for chest pain.   Gastrointestinal: Negative for diarrhea.   Genitourinary: Positive for dysuria, frequency, nocturia and testicular pain. Negative for hematuria and urgency.   Musculoskeletal: Negative for joint swelling.   Skin: Negative for rash.   Neurological: Negative for seizures.   Hematological: Does not bruise/bleed easily.   Psychiatric/Behavioral: Negative for behavioral problems.     Objective:      Physical Exam   Constitutional: He is oriented to person, place, and time. He appears well-developed and well-nourished.   HENT:   Head: Normocephalic and atraumatic.   Eyes: No scleral icterus.   Neck: Neck supple.   Cardiovascular: Normal rate and regular rhythm.    Pulmonary/Chest: Effort normal. No respiratory distress.   Abdominal: He exhibits no mass. A hernia is present. Hernia confirmed positive in the left inguinal area. Hernia confirmed negative in the right inguinal area.   Genitourinary:  Testes normal and penis normal. Circumcised.   Genitourinary Comments: Prostate was smooth without nodularity. No rectal masses 30 grams. External hemorrhoids were not present. Normal perineum.     Musculoskeletal: He exhibits no tenderness.   Lymphadenopathy:     He has no cervical adenopathy. No inguinal adenopathy noted on the right or left side.   Neurological: He is alert and oriented to person, place, and time.   Skin: Skin is warm. No rash noted.     Psychiatric: He has a normal mood and affect.     urine clear  A post void residual bladder scan was performed immediately after voiding. The patient's PVR was noted to be 127cc.    Assessment:       1. Pelvic pain    2. Cancer of prostate with intermediate recurrence risk (stage T2b-c or Nia 7 or PSA 10-20)    3. Nephrolithiasis        Plan:   Bay was seen today for prostate cancer and nephrolithiasis.    Diagnoses and all orders for this visit:    Pelvic pain  -     Ureaplasma PCR Urine; Future  -     Mycoplasma hominus Molecular detection PCR Urine; Future  -     Urine culture  -     Bladder scan  -     Ambulatory consult to Physical Therapy  -     CT Renal Stone Study ABD Pelvis WO; Future  -     Mycoplasma hominus Molecular detection PCR Urine  -     Ureaplasma PCR Urine    Cancer of prostate with intermediate recurrence risk (stage T2b-c or Nia 7 or PSA 10-20)  -     Ambulatory referral to Radiation Oncology    Nephrolithiasis  -     CT Renal Stone Study ABD Pelvis WO; Future      YURY score of 2 - This indicates relatively low risk prostate cancer  Among 3,000 men with YURY 0-2 who underwent radical prostatectomy at [facility] since 2000, the likelihood of:   metastasis or dying from prostate cancer within  5 years was less than 1%   detectable PSA or needing a second cancer treatment within 5 years was 5%  NCCN guidelines - favorable intermediate disease.     Referral to pelvic PT - pelvic pain and future OCTAVIA (planning on robotic prostatectomy).    Cystoscopy for urethral pain.   Trial of tumeric. Can't take NSAIDs.    Prostate cancer - discussed treatment options to include prostatectomy, active surveillance and brachytherapy with XRT.   Referral to Dr. Clements.   Referral to Dr. Jimenez.   Genomic testing. Will find out from Dr. Clements about ordering Prolaris and if he would want a MRI in this patient and if he would like to get the outside path for [facility] to review.     CT, cultures and possible cysto to figure out cause of pain. ? Ureteral stone?   Restart flomax. Patient has some at home.   I spent over 1 hour with patient in reviewing information with patient and family.

## 2019-09-18 ENCOUNTER — TELEPHONE (OUTPATIENT)
Dept: UROLOGY | Facility: CLINIC | Age: 69
End: 2019-09-18

## 2019-09-18 NOTE — TELEPHONE ENCOUNTER
A message was on all contacts. Contact information and contact number was given to the patient.    Zamzam

## 2019-09-19 ENCOUNTER — TELEPHONE (OUTPATIENT)
Dept: PRIMARY CARE CLINIC | Facility: CLINIC | Age: 69
End: 2019-09-19

## 2019-09-19 ENCOUNTER — HOSPITAL ENCOUNTER (OUTPATIENT)
Dept: RADIOLOGY | Facility: HOSPITAL | Age: 69
Discharge: HOME | End: 2019-09-19
Attending: UROLOGY
Payer: MEDICARE

## 2019-09-19 ENCOUNTER — TELEPHONE (OUTPATIENT)
Dept: UROLOGY | Facility: CLINIC | Age: 69
End: 2019-09-19

## 2019-09-19 DIAGNOSIS — R10.2 PELVIC PAIN: ICD-10-CM

## 2019-09-19 DIAGNOSIS — N20.0 NEPHROLITHIASIS: ICD-10-CM

## 2019-09-19 LAB — BACTERIA UR CULT: ABNORMAL

## 2019-09-19 PROCEDURE — 74176 CT RENAL STONE STUDY ABD PELVIS WO: ICD-10-PCS | Mod: 26,,, | Performed by: RADIOLOGY

## 2019-09-19 PROCEDURE — 74176 CT ABD & PELVIS W/O CONTRAST: CPT | Mod: 26,,, | Performed by: RADIOLOGY

## 2019-09-19 PROCEDURE — 74176 CT ABD & PELVIS W/O CONTRAST: CPT | Mod: TC

## 2019-09-19 NOTE — TELEPHONE ENCOUNTER
Called pt. States we have the wrong # pt. Wife called about lab results, results were already reviewed with pt. On 9/18/19. Pt. Has f/u with Mika on 9/24

## 2019-09-19 NOTE — TELEPHONE ENCOUNTER
----- Message from Ashly Randolph sent at 9/19/2019  8:07 AM CDT -----  Contact: Manpreet/Gabby/[phone]  Patient is returning a phone call.  Who left a message for the patient: Dr Brennan's office  Does patient know what this is regarding:  Results  Comments:

## 2019-09-20 LAB
M HOMINIS DNA SPEC QL NAA+PROBE: NEGATIVE
SPECIMEN SOURCE: NORMAL
SPECIMEN SOURCE: NORMAL
U PARVUM DNA SPEC QL NAA+PROBE: NEGATIVE
U UREALYTICUM DNA SPEC QL NAA+PROBE: NEGATIVE

## 2019-09-23 ENCOUNTER — TELEPHONE (OUTPATIENT)
Dept: UROLOGY | Facility: CLINIC | Age: 69
End: 2019-09-23

## 2019-09-23 RX ORDER — SULFAMETHOXAZOLE AND TRIMETHOPRIM 800; 160 MG/1; MG/1
1 TABLET ORAL 2 TIMES DAILY
Qty: 14 TABLET | Refills: 0 | Status: SHIPPED | OUTPATIENT
Start: 2019-09-23 | End: 2019-09-30

## 2019-09-23 NOTE — TELEPHONE ENCOUNTER
Spoke to the patient and his wife. They verbally understood.    Zamzam       Message from Kristal De La Rosa MD sent at 9/23/2019 10:06 AM CDT -----  Bactrim sent for uti

## 2019-09-24 DIAGNOSIS — G44.219 EPISODIC TENSION-TYPE HEADACHE, NOT INTRACTABLE: ICD-10-CM

## 2019-09-25 ENCOUNTER — TELEPHONE (OUTPATIENT)
Dept: UROLOGY | Facility: CLINIC | Age: 69
End: 2019-09-25

## 2019-09-25 RX ORDER — BUTALBITAL, ACETAMINOPHEN AND CAFFEINE 50; 325; 40 MG/1; MG/1; MG/1
TABLET ORAL
Qty: 45 TABLET | Refills: 1 | Status: SHIPPED | OUTPATIENT
Start: 2019-09-25 | End: 2019-11-28 | Stop reason: SDUPTHER

## 2019-09-30 ENCOUNTER — TELEPHONE (OUTPATIENT)
Dept: UROLOGY | Facility: CLINIC | Age: 69
End: 2019-09-30

## 2019-09-30 NOTE — TELEPHONE ENCOUNTER
Spoke to the patient he verbally understood the results to the CT scan.      Message from Mily Silvestre sent at 9/30/2019 10:58 AM CDT -----  Contact: Gabby (wife): [phone]  Pt's wife returning call re ct scan    Please contact Gabby (wife): [phone]

## 2019-10-07 ENCOUNTER — PATIENT OUTREACH (OUTPATIENT)
Dept: ADMINISTRATIVE | Facility: OTHER | Age: 69
End: 2019-10-07

## 2019-10-10 ENCOUNTER — OFFICE VISIT (OUTPATIENT)
Dept: UROLOGY | Facility: CLINIC | Age: 69
End: 2019-10-10
Payer: MEDICARE

## 2019-10-10 ENCOUNTER — TELEPHONE (OUTPATIENT)
Dept: UROLOGY | Facility: CLINIC | Age: 69
End: 2019-10-10

## 2019-10-10 VITALS
DIASTOLIC BLOOD PRESSURE: 75 MMHG | SYSTOLIC BLOOD PRESSURE: 119 MMHG | BODY MASS INDEX: 28.34 KG/M2 | HEART RATE: 70 BPM | WEIGHT: 180.56 LBS | HEIGHT: 67 IN | TEMPERATURE: 98 F | RESPIRATION RATE: 16 BRPM

## 2019-10-10 VITALS — HEART RATE: 70 BPM | SYSTOLIC BLOOD PRESSURE: 119 MMHG | DIASTOLIC BLOOD PRESSURE: 75 MMHG

## 2019-10-10 DIAGNOSIS — N30.00 ACUTE CYSTITIS WITHOUT HEMATURIA: ICD-10-CM

## 2019-10-10 DIAGNOSIS — C61 PROSTATE CANCER: Primary | ICD-10-CM

## 2019-10-10 DIAGNOSIS — D49.59 NEOPLASM OF PROSTATE: ICD-10-CM

## 2019-10-10 DIAGNOSIS — M51.36 DDD (DEGENERATIVE DISC DISEASE), LUMBAR: ICD-10-CM

## 2019-10-10 PROCEDURE — 1101F PR PT FALLS ASSESS DOC 0-1 FALLS W/OUT INJ PAST YR: ICD-10-PCS | Mod: S$GLB,,, | Performed by: UROLOGY

## 2019-10-10 PROCEDURE — 99204 OFFICE O/P NEW MOD 45 MIN: CPT | Mod: S$GLB,,, | Performed by: RADIOLOGY

## 2019-10-10 PROCEDURE — 87086 URINE CULTURE/COLONY COUNT: CPT

## 2019-10-10 PROCEDURE — 3078F PR MOST RECENT DIASTOLIC BLOOD PRESSURE < 80 MM HG: ICD-10-PCS | Mod: S$GLB,,, | Performed by: RADIOLOGY

## 2019-10-10 PROCEDURE — 3078F DIAST BP <80 MM HG: CPT | Mod: S$GLB,,, | Performed by: UROLOGY

## 2019-10-10 PROCEDURE — 99214 PR OFFICE/OUTPT VISIT, EST, LEVL IV, 30-39 MIN: ICD-10-PCS | Mod: S$GLB,,, | Performed by: UROLOGY

## 2019-10-10 PROCEDURE — 1101F PT FALLS ASSESS-DOCD LE1/YR: CPT | Mod: S$GLB,,, | Performed by: RADIOLOGY

## 2019-10-10 PROCEDURE — 3078F DIAST BP <80 MM HG: CPT | Mod: S$GLB,,, | Performed by: RADIOLOGY

## 2019-10-10 PROCEDURE — 99999 PR PBB SHADOW E&M-EST. PATIENT-LVL III: CPT | Mod: PBBFAC,,, | Performed by: UROLOGY

## 2019-10-10 PROCEDURE — 1101F PT FALLS ASSESS-DOCD LE1/YR: CPT | Mod: S$GLB,,, | Performed by: UROLOGY

## 2019-10-10 PROCEDURE — 1101F PR PT FALLS ASSESS DOC 0-1 FALLS W/OUT INJ PAST YR: ICD-10-PCS | Mod: S$GLB,,, | Performed by: RADIOLOGY

## 2019-10-10 PROCEDURE — 99214 OFFICE O/P EST MOD 30 MIN: CPT | Mod: S$GLB,,, | Performed by: UROLOGY

## 2019-10-10 PROCEDURE — 87186 SC STD MICRODIL/AGAR DIL: CPT

## 2019-10-10 PROCEDURE — 99204 PR OFFICE/OUTPT VISIT, NEW, LEVL IV, 45-59 MIN: ICD-10-PCS | Mod: S$GLB,,, | Performed by: RADIOLOGY

## 2019-10-10 PROCEDURE — 87088 URINE BACTERIA CULTURE: CPT

## 2019-10-10 PROCEDURE — 87077 CULTURE AEROBIC IDENTIFY: CPT

## 2019-10-10 PROCEDURE — 99999 PR PBB SHADOW E&M-EST. PATIENT-LVL III: ICD-10-PCS | Mod: PBBFAC,,, | Performed by: UROLOGY

## 2019-10-10 PROCEDURE — 3074F SYST BP LT 130 MM HG: CPT | Mod: S$GLB,,, | Performed by: UROLOGY

## 2019-10-10 PROCEDURE — 3078F PR MOST RECENT DIASTOLIC BLOOD PRESSURE < 80 MM HG: ICD-10-PCS | Mod: S$GLB,,, | Performed by: UROLOGY

## 2019-10-10 PROCEDURE — 3074F SYST BP LT 130 MM HG: CPT | Mod: S$GLB,,, | Performed by: RADIOLOGY

## 2019-10-10 PROCEDURE — 3074F PR MOST RECENT SYSTOLIC BLOOD PRESSURE < 130 MM HG: ICD-10-PCS | Mod: S$GLB,,, | Performed by: RADIOLOGY

## 2019-10-10 PROCEDURE — 3074F PR MOST RECENT SYSTOLIC BLOOD PRESSURE < 130 MM HG: ICD-10-PCS | Mod: S$GLB,,, | Performed by: UROLOGY

## 2019-10-10 PROCEDURE — 99999 PR PBB SHADOW E&M-EST. PATIENT-LVL III: CPT | Mod: PBBFAC,,, | Performed by: RADIOLOGY

## 2019-10-10 PROCEDURE — 99999 PR PBB SHADOW E&M-EST. PATIENT-LVL III: ICD-10-PCS | Mod: PBBFAC,,, | Performed by: RADIOLOGY

## 2019-10-10 RX ORDER — TRAMADOL HYDROCHLORIDE 50 MG/1
TABLET ORAL
Qty: 120 TABLET | Refills: 0 | Status: SHIPPED | OUTPATIENT
Start: 2019-10-10 | End: 2019-11-10 | Stop reason: SDUPTHER

## 2019-10-10 NOTE — PROGRESS NOTES
Multidisciplinary Uro-Oncology Clinic    HISTORY OF PRESENT ILLNESS:   This patient presents for a second opinion regarding radiotherapy for his recently diagnosed prostate cancer.     Mr. Yadav was recently noted to have an increased PSA velocity at 3.35 ng/ml in July of this year.  He subsequently underwent TRUS and biopsy of his prostate per Dr. Darling at [facility].  Biopsies from the Rt. apex revealed [place] 7 (3+4) adenocarcinoma involving 50% of 2 cores.  There was focal perineural invasion but no lymphovascular invasion.  Biopsies from the Lt. base, Lt. mid gland and Lt. apex revealed Nia 6 (3+3) adenocarcinoma involving 5% of the biopsy specimens in each area.  The patient has long history of recurrent kidney stones.  CT of the abdomen on 9/19/19 revealed Bilateral subcentimeter nonobstructing nephrolithiasis, but no pelvic adenopathy.  Of not there were scattered bilateral subcentimeter pulmonary nodules throughout both lung bases.   The patient presents with his family to discuss his treatment options.      REVIEW OF SYSTEMS:   Review of Systems   Constitutional: Negative for chills, fever, malaise/fatigue and weight loss.   Respiratory: Negative for cough, sputum production and shortness of breath.    Cardiovascular: Negative for chest pain and palpitations.   Gastrointestinal: Negative for abdominal pain, constipation and diarrhea.   Genitourinary: Negative for dysuria, frequency, hematuria and urgency.         PAST MEDICAL HISTORY:  Past Medical History:   Diagnosis Date    Acid reflux     Cervical disc herniation     Depression     Heart murmur     Hyperlipidemia     Lumbar disc herniation     Nephrolithiasis     Upper GI bleed        PAST SURGICAL HISTORY:  Past Surgical History:   Procedure Laterality Date    EYE SURGERY         ALLERGIES:   Review of patient's allergies indicates:   Allergen Reactions    Asa [aspirin]      Gastro bleed       MEDICATIONS:  Current Outpatient Medications    Medication Sig    acetaminophen/diphenhydramine (PERCOGESIC ORAL) Take by mouth.    butalbital-acetaminophen-caffeine -40 mg (FIORICET, ESGIC) -40 mg per tablet TAKE 1 TABLET BY MOUTH EVERY 6 HOURS AS NEEDED FOR HEADACHES    escitalopram oxalate (LEXAPRO) 10 MG tablet TAKE 1 TABLET(10 MG) BY MOUTH EVERY DAY    lisinopril 10 MG tablet TAKE 1 TABLET(10 MG) BY MOUTH EVERY DAY    meclizine (ANTIVERT) 25 mg tablet Take 1 tablet (25 mg total) by mouth 3 (three) times daily as needed.    multivitamin with minerals tablet Take 1 tablet by mouth once daily.    omeprazole (PRILOSEC) 40 MG capsule TAKE 1 CAPSULE(40 MG) BY MOUTH EVERY DAY    rosuvastatin (CRESTOR) 20 MG tablet TAKE 1 TABLET(20 MG) BY MOUTH EVERY DAY    traMADol (ULTRAM) 50 mg tablet TAKE 1 TABLET BY MOUTH EVERY 6 HOURS AS NEEDED    vitB6/mag cit,ox/potassium cit (THERALITH XR ORAL) Take by mouth.    hydrocodone-homatropine 5-1.5 mg/5 ml (HYCODAN) 5-1.5 mg/5 mL Syrp Take 5 mLs by mouth every 6 (six) hours as needed (cough). (Patient not taking: Reported on 10/10/2019)     No current facility-administered medications for this visit.        SOCIAL HISTORY:  Social History     Socioeconomic History    Marital status:      Spouse name: Not on file    Number of children: Not on file    Years of education: Not on file    Highest education level: Not on file   Occupational History    Not on file   Social Needs    Financial resource strain: Not on file    Food insecurity:     Worry: Not on file     Inability: Not on file    Transportation needs:     Medical: Not on file     Non-medical: Not on file   Tobacco Use    Smoking status: Former Smoker    Smokeless tobacco: Never Used   Substance and Sexual Activity    Alcohol use: No    Drug use: No    Sexual activity: Not on file   Lifestyle    Physical activity:     Days per week: Not on file     Minutes per session: Not on file    Stress: Not on file   Relationships    Social  connections:     Talks on phone: Not on file     Gets together: Not on file     Attends [employer]regational service: Not on file     Active member of club or organization: Not on file     Attends meetings of clubs or organizations: Not on file     Relationship status: Not on file   Other Topics Concern    Not on file   Social History Narrative    Not on file       FAMILY HISTORY:  Family History   Problem Relation Age of Onset    Cancer Mother     Cancer Father     Cancer Brother          PHYSICAL EXAMINATION:  Vitals:    10/10/19 1433   BP: 119/75   Pulse: 70   Resp: 16   Temp: 98.2 °F (36.8 °C)     Physical Exam   Constitutional: He is oriented to person, place, and time and well-developed, well-nourished, and in no distress.   Pulmonary/Chest: Effort normal. No respiratory distress.   Abdominal: Soft. He exhibits no distension.   Neurological: He is alert and oriented to person, place, and time.   Psychiatric: Affect and judgment normal.       ASSESSMENT/PLAN:  Clinical stage IIB (T1c, N0, M0, PSA < 10, GG2) prostate cancer.       ECO    I had a long discussion with the patient and his family.  We reviewed his diagnosis, stage, grade, risk group, and prognosis. We discussed the concept of low risk, moderate risk, and high risk disease.  I explained to the patient he is considered to have intermediate risk prostate cancer.  We discussed the different treatment options including active surveillance, prostate brachytherapy, external beam treatment using IMRT /  IGRT techniques and robotic prostatectomy.We discussed the advantages, disadvantages, risks and benefits, as well as complications of each option. Regarding radiation therapy we discussed treatment planning, the different techniques, short and long term complications. These included radiation cystitis, radiation proctitis, and impotence. We discussed success, failure, and salvage therapeutic options.  We discussed surgical therapy including postoperative  recuperation and recovery, and short and long term complications. We discussed the risks of incontinence, impotence, and recurrence. I answered questions and addressed concerns. The patient is very interested in RALP.  His is planned to meet with Dr. Clements later today.   Discussed the case with Dr. Clements who is in agreement with the current plan.      Psychosocial Distress screening score of Distress Score: 1 noted and reviewed. No intervention indicated.    I spent approximately 45  minutes reviewing the available records and evaluating the patient, out of which over 50% of the time was spent face to face with the patient in counseling and coordinating this patient's care.

## 2019-10-11 ENCOUNTER — OFFICE VISIT (OUTPATIENT)
Dept: PRIMARY CARE CLINIC | Facility: CLINIC | Age: 69
End: 2019-10-11
Payer: MEDICARE

## 2019-10-11 VITALS
DIASTOLIC BLOOD PRESSURE: 80 MMHG | BODY MASS INDEX: 28.56 KG/M2 | HEIGHT: 67 IN | WEIGHT: 182 LBS | TEMPERATURE: 98 F | OXYGEN SATURATION: 99 % | SYSTOLIC BLOOD PRESSURE: 120 MMHG | HEART RATE: 66 BPM | RESPIRATION RATE: 18 BRPM

## 2019-10-11 DIAGNOSIS — I70.0 ATHEROSCLEROSIS OF ABDOMINAL AORTA: Primary | ICD-10-CM

## 2019-10-11 DIAGNOSIS — M19.012 OSTEOARTHRITIS OF GLENOHUMERAL JOINT, LEFT: ICD-10-CM

## 2019-10-11 DIAGNOSIS — I10 ESSENTIAL HYPERTENSION, BENIGN: ICD-10-CM

## 2019-10-11 DIAGNOSIS — R73.03 BORDERLINE DIABETES: ICD-10-CM

## 2019-10-11 DIAGNOSIS — M51.36 DDD (DEGENERATIVE DISC DISEASE), LUMBAR: ICD-10-CM

## 2019-10-11 DIAGNOSIS — M50.30 DDD (DEGENERATIVE DISC DISEASE), CERVICAL: ICD-10-CM

## 2019-10-11 DIAGNOSIS — E78.2 MIXED HYPERLIPIDEMIA: ICD-10-CM

## 2019-10-11 PROCEDURE — 99999 PR PBB SHADOW E&M-EST. PATIENT-LVL III: ICD-10-PCS | Mod: PBBFAC,,, | Performed by: FAMILY MEDICINE

## 2019-10-11 PROCEDURE — 99999 PR PBB SHADOW E&M-EST. PATIENT-LVL III: CPT | Mod: PBBFAC,,, | Performed by: FAMILY MEDICINE

## 2019-10-11 PROCEDURE — 1101F PR PT FALLS ASSESS DOC 0-1 FALLS W/OUT INJ PAST YR: ICD-10-PCS | Mod: S$GLB,,, | Performed by: FAMILY MEDICINE

## 2019-10-11 PROCEDURE — 20610 LARGE JOINT ASPIRATION/INJECTION: L GLENOHUMERAL: ICD-10-PCS | Mod: LT,S$GLB,, | Performed by: FAMILY MEDICINE

## 2019-10-11 PROCEDURE — 20610 DRAIN/INJ JOINT/BURSA W/O US: CPT | Mod: LT,S$GLB,, | Performed by: FAMILY MEDICINE

## 2019-10-11 PROCEDURE — 99214 OFFICE O/P EST MOD 30 MIN: CPT | Mod: 25,S$GLB,, | Performed by: FAMILY MEDICINE

## 2019-10-11 PROCEDURE — 1101F PT FALLS ASSESS-DOCD LE1/YR: CPT | Mod: S$GLB,,, | Performed by: FAMILY MEDICINE

## 2019-10-11 PROCEDURE — 3079F PR MOST RECENT DIASTOLIC BLOOD PRESSURE 80-89 MM HG: ICD-10-PCS | Mod: S$GLB,,, | Performed by: FAMILY MEDICINE

## 2019-10-11 PROCEDURE — 3074F PR MOST RECENT SYSTOLIC BLOOD PRESSURE < 130 MM HG: ICD-10-PCS | Mod: S$GLB,,, | Performed by: FAMILY MEDICINE

## 2019-10-11 PROCEDURE — 3074F SYST BP LT 130 MM HG: CPT | Mod: S$GLB,,, | Performed by: FAMILY MEDICINE

## 2019-10-11 PROCEDURE — 3079F DIAST BP 80-89 MM HG: CPT | Mod: S$GLB,,, | Performed by: FAMILY MEDICINE

## 2019-10-11 PROCEDURE — 99214 PR OFFICE/OUTPT VISIT, EST, LEVL IV, 30-39 MIN: ICD-10-PCS | Mod: 25,S$GLB,, | Performed by: FAMILY MEDICINE

## 2019-10-11 RX ORDER — METHYLPREDNISOLONE ACETATE 40 MG/ML
40 INJECTION, SUSPENSION INTRA-ARTICULAR; INTRALESIONAL; INTRAMUSCULAR; SOFT TISSUE
Status: DISCONTINUED | OUTPATIENT
Start: 2019-10-11 | End: 2019-10-11 | Stop reason: HOSPADM

## 2019-10-11 RX ADMIN — METHYLPREDNISOLONE ACETATE 40 MG: 40 INJECTION, SUSPENSION INTRA-ARTICULAR; INTRALESIONAL; INTRAMUSCULAR; SOFT TISSUE at 11:10

## 2019-10-11 NOTE — PROCEDURES
Large Joint Aspiration/Injection: L glenohumeral  Date/Time: 10/11/2019 11:15 AM  Performed by: Wayne Brennan MD  Authorized by: Wayne Brennan MD     Consent Done?:  Yes (Verbal)  Indications:  Pain  Procedure site marked: Yes    Timeout: Prior to procedure the correct patient, procedure, and site was verified    Anesthesia  Local anesthesia used  Anesthesia: local infiltration  Anesthetic: lidocaine 1% without epinephrine  Anesthetic total: 2mL    Location:  Shoulder  Site:  L glenohumeral  Prep: Patient was prepped and draped in usual sterile fashion    Needle size:  22 G  Ultrasonic Guidance for needle placement: No  Approach:  Posterior  Medications:  40 mg methylPREDNISolone acetate 40 mg/mL  Patient tolerance:  Patient tolerated the procedure well with no immediate complications

## 2019-10-11 NOTE — PROGRESS NOTES
"Subjective:       Patient ID: Bay Yadav is a 68 y.o. male.    Chief Complaint: Hyperlipidemia (here for a 6 month check up )    Diagnosed with prostate CA earlier this year based on elevated PSA velocity, scheduled for robotic prostatectomy next month by Dr. Clements. Continues to have persistent groin/pelvic pain of unknown etiology  Chronic neck and lower back pain status post previous work related injury, stable on current medication regimen.  No pattern of aberrant medication use  Also complains of worsening left shoulder pain.  Has had cortisone injection in the past, last done about a year ago, requesting another steroid shot if possible    Review of Systems   Constitutional: Negative for fever.   HENT: Negative for trouble swallowing.    Eyes: Negative for visual disturbance.   Respiratory: Negative for shortness of breath.    Cardiovascular: Negative for chest pain.   Gastrointestinal: Negative for blood in stool, nausea and vomiting.   Genitourinary: Positive for difficulty urinating and dysuria.   Musculoskeletal: Positive for arthralgias.   Neurological: Negative for weakness.   Hematological: Does not bruise/bleed easily.   Psychiatric/Behavioral: Negative for agitation and confusion.       Objective:      Vitals:    10/11/19 1114   BP: 120/80   BP Location: Left arm   Patient Position: Sitting   BP Method: Medium (Manual)   Pulse: 66   Resp: 18   Temp: 98.1 °F (36.7 °C)   TempSrc: Oral   SpO2: 99%   Weight: 82.6 kg (182 lb)   Height: 5' 7" (1.702 m)     Physical Exam   Constitutional: He is oriented to person, place, and time. He appears well-developed and well-nourished.   HENT:   Head: Normocephalic and atraumatic.   Cardiovascular: Normal rate, regular rhythm and normal heart sounds.   Pulmonary/Chest: Effort normal and breath sounds normal.   Abdominal: Soft. Bowel sounds are normal. He exhibits no distension. There is no tenderness.   Musculoskeletal: He exhibits no edema.        Left " shoulder: He exhibits crepitus. He exhibits normal range of motion (Pain with passive range of motion), no tenderness, no swelling, no effusion and no deformity.   Neurological: He is alert and oriented to person, place, and time.   Skin: Skin is warm and dry.   Psychiatric: He has a normal mood and affect. His behavior is normal.   Nursing note and vitals reviewed.      Assessment:       1. Atherosclerosis of abdominal aorta    2. Borderline diabetes    3. Essential hypertension, benign    4. Mixed hyperlipidemia    5. DDD (degenerative disc disease), lumbar    6. DDD (degenerative disc disease), cervical    7. Osteoarthritis of glenohumeral joint, left        Plan:       Atherosclerosis of abdominal aorta  -     CBC auto differential; Future; Expected date: 04/11/2020  Needs to maintain control blood pressure and dyslipidemia  Borderline diabetes  -     Hemoglobin A1c; Future; Expected date: 04/11/2020  Stable  Essential hypertension, benign  -     CBC auto differential; Future; Expected date: 04/11/2020  Well controlled  Mixed hyperlipidemia  -     Comprehensive metabolic panel; Future; Expected date: 04/11/2020  -     Lipid panel; Future; Expected date: 04/11/2020  Continue current regimen  DDD (degenerative disc disease), lumbar  Stable  DDD (degenerative disc disease), cervical  Stable  Osteoarthritis of glenohumeral joint, left  -     Large Joint Aspiration/Injection: L glenohumeral  -     methylPREDNISolone acetate injection 40 mg      Medication List with Changes/Refills   Current Medications    ACETAMINOPHEN/DIPHENHYDRAMINE (PERCOGESIC ORAL)    Take by mouth.    BUTALBITAL-ACETAMINOPHEN-CAFFEINE -40 MG (FIORICET, ESGIC) -40 MG PER TABLET    TAKE 1 TABLET BY MOUTH EVERY 6 HOURS AS NEEDED FOR HEADACHES    ESCITALOPRAM OXALATE (LEXAPRO) 10 MG TABLET    TAKE 1 TABLET(10 MG) BY MOUTH EVERY DAY    HYDROCODONE-HOMATROPINE 5-1.5 MG/5 ML (HYCODAN) 5-1.5 MG/5 ML SYRP    Take 5 mLs by mouth every 6 (six)  hours as needed (cough).    LISINOPRIL 10 MG TABLET    TAKE 1 TABLET(10 MG) BY MOUTH EVERY DAY    MECLIZINE (ANTIVERT) 25 MG TABLET    Take 1 tablet (25 mg total) by mouth 3 (three) times daily as needed.    MULTIVITAMIN WITH MINERALS TABLET    Take 1 tablet by mouth once daily.    OMEPRAZOLE (PRILOSEC) 40 MG CAPSULE    TAKE 1 CAPSULE(40 MG) BY MOUTH EVERY DAY    ROSUVASTATIN (CRESTOR) 20 MG TABLET    TAKE 1 TABLET(20 MG) BY MOUTH EVERY DAY    TRAMADOL (ULTRAM) 50 MG TABLET    TAKE 1 TABLET BY MOUTH EVERY 6 HOURS AS NEEDED    VITB6/MAG CIT,OX/POTASSIUM CIT (THERALITH XR ORAL)    Take by mouth.

## 2019-10-14 ENCOUNTER — TELEPHONE (OUTPATIENT)
Dept: UROLOGY | Facility: CLINIC | Age: 69
End: 2019-10-14

## 2019-10-14 LAB — BACTERIA UR CULT: ABNORMAL

## 2019-10-14 RX ORDER — NITROFURANTOIN 25; 75 MG/1; MG/1
100 CAPSULE ORAL 2 TIMES DAILY
Qty: 20 CAPSULE | Refills: 0 | Status: SHIPPED | OUTPATIENT
Start: 2019-10-14 | End: 2019-10-24

## 2019-10-18 ENCOUNTER — HOSPITAL ENCOUNTER (OUTPATIENT)
Dept: RADIOLOGY | Facility: HOSPITAL | Age: 69
Discharge: HOME | End: 2019-10-18
Attending: UROLOGY
Payer: MEDICARE

## 2019-10-18 DIAGNOSIS — D49.59 NEOPLASM OF PROSTATE: ICD-10-CM

## 2019-10-18 PROCEDURE — 72197 MRI PELVIS W/O & W/DYE: CPT | Mod: 26,,, | Performed by: RADIOLOGY

## 2019-10-18 PROCEDURE — 72197 MRI PROSTATE W W/O CONTRAST: ICD-10-PCS | Mod: 26,,, | Performed by: RADIOLOGY

## 2019-10-18 PROCEDURE — A9585 GADOBUTROL INJECTION: HCPCS | Performed by: UROLOGY

## 2019-10-18 PROCEDURE — 72197 MRI PELVIS W/O & W/DYE: CPT | Mod: TC

## 2019-10-18 PROCEDURE — 25500020 PHARM REV CODE 255: Performed by: UROLOGY

## 2019-10-18 RX ORDER — GADOBUTROL 604.72 MG/ML
10 INJECTION INTRAVENOUS
Status: COMPLETED | OUTPATIENT
Start: 2019-10-18 | End: 2019-10-18

## 2019-10-18 RX ADMIN — GADOBUTROL 10 ML: 604.72 INJECTION INTRAVENOUS at 07:10

## 2019-10-25 ENCOUNTER — TELEPHONE (OUTPATIENT)
Dept: PREADMISSION TESTING | Facility: HOSPITAL | Age: 69
End: 2019-10-25

## 2019-10-25 ENCOUNTER — ANESTHESIA EVENT (OUTPATIENT)
Dept: SURGERY | Facility: HOSPITAL | Age: 69
End: 2019-10-25
Payer: MEDICARE

## 2019-10-25 NOTE — PRE ADMISSION SCREENING
Anesthesia Assessment: Preoperative EQUATION    Planned Procedure: Procedure(s) (LRB):  XI ROBOTIC PROSTATECTOMY (N/A)  Requested Anesthesia Type:General  Surgeon: Jerardo Clements MD  Service: Urology  Known or anticipated Date of Surgery:11/18/2019    Surgeon notes: reviewed    Previous anesthesia records:Not available    Last PCP note: within 1 month , within [facility]  Dr. Brennan  Subspecialty notes: Urology    Other important co-morbidities: Per Epic:  HTN/HLP, DDD, Osteoarthritis, Prostate CA, prediabetic     Tests already available:  Available tests,  within 3 months , within [facility] .  9/17/2019 Lipid Panel, CMP, CBC, A1c; 3/19/19 EKG            Instructions given. (See in Nurse's note)    Optimization:  Anesthesia Preop Clinic Assessment  Indicated.        Plan:    Testing:  T&S   Pre-anesthesia  visit       Visit focus: concerns in complex and/or prolonged anesthesia, position other than supine     Patient  has previously scheduled Medical Appointment: 11/13/2019    Navigation: Tests Scheduled.              Consults scheduled.             Results will be tracked by Preop Clinic.

## 2019-10-25 NOTE — TELEPHONE ENCOUNTER
----- Message from Tammi London RN sent at 10/25/2019  2:01 PM CDT -----  Surgery date: 11/18/2019  PreOp appt:  11/13/2019    Please call Pt and schedule the following preop appts:    POC  Lab    Thank you!  Tammi

## 2019-10-25 NOTE — ANESTHESIA PREPROCEDURE EVALUATION
[facility]  Anesthesia Pre-Operative Evaluation         Patient Name: Bay Yadav  YOB: 1950  MRN: [id number]    SUBJECTIVE:     Pre-operative evaluation for Procedure(s) (LRB):  XI ROBOTIC PROSTATECTOMY (N/A)     11/17/2019    Bay Yadav is a 68 y.o. male w/ a significant PMHx of HTN (on lisinopril), HLD, GERD, DDD of cervical and lumbar spine, depression prostate cancer.    Patient now presents for the above procedure(s).      LDA: None documented.    Prev airway: None documented.    Drips: None documented.      Patient Active Problem List   Diagnosis    DDD (degenerative disc disease), cervical    DDD (degenerative disc disease), lumbar    Chronic pain due to trauma    Borderline diabetes    Depression    Essential hypertension, benign    Mixed hyperlipidemia    GERD (gastroesophageal reflux disease)    Episodic tension-type headache, not intractable    Osteoarthritis of glenohumeral joint, left    Atherosclerosis of abdominal aorta    Prostate cancer       Review of patient's allergies indicates:   Allergen Reactions    Asa [aspirin]      Gastro bleed       Current Inpatient Medications:      No current facility-administered medications on file prior to encounter.      Current Outpatient Medications on File Prior to Encounter   Medication Sig Dispense Refill    butalbital-acetaminophen-caffeine -40 mg (FIORICET, ESGIC) -40 mg per tablet TAKE 1 TABLET BY MOUTH EVERY 6 HOURS AS NEEDED FOR HEADACHES 45 tablet 1    escitalopram oxalate (LEXAPRO) 10 MG tablet TAKE 1 TABLET(10 MG) BY MOUTH EVERY DAY 90 tablet 3    multivitamin with minerals tablet Take 1 tablet by mouth once daily.      rosuvastatin (CRESTOR) 20 MG tablet TAKE 1 TABLET(20 MG) BY MOUTH EVERY DAY 90 tablet 3    vitB6/mag cit,ox/potassium cit (THERALITH XR ORAL) Take by mouth.      acetaminophen/diphenhydramine (PERCOGESIC ORAL) Take by mouth.      hydrocodone-homatropine  5-1.5 mg/5 ml (HYCODAN) 5-1.5 mg/5 mL Syrp Take 5 mLs by mouth every 6 (six) hours as needed (cough). (Patient not taking: Reported on 10/10/2019) 120 mL 0       Past Surgical History:   Procedure Laterality Date    EYE SURGERY         Social History     Socioeconomic History    Marital status:      Spouse name: Not on file    Number of children: Not on file    Years of education: Not on file    Highest education level: Not on file   Occupational History    Not on file   Social Needs    Financial resource strain: Not on file    Food insecurity:     Worry: Not on file     Inability: Not on file    Transportation needs:     Medical: Not on file     Non-medical: Not on file   Tobacco Use    Smoking status: Former Smoker    Smokeless tobacco: Never Used   Substance and Sexual Activity    Alcohol use: No    Drug use: No    Sexual activity: Not on file   Lifestyle    Physical activity:     Days per week: Not on file     Minutes per session: Not on file    Stress: Not on file   Relationships    Social connections:     Talks on phone: Not on file     Gets together: Not on file     Attends [employer] service: Not on file     Active member of club or organization: Not on file     Attends meetings of clubs or organizations: Not on file     Relationship status: Not on file   Other Topics Concern    Not on file   Social History Narrative    Not on file       OBJECTIVE:     Vital Signs Range (Last 24H):         Significant Labs:  Lab Results   Component Value Date    WBC 9.96 11/13/2019    HGB 13.5 (L) 11/13/2019    HCT 42.4 11/13/2019     11/13/2019    CHOL 170 11/13/2019    TRIG 187 (H) 11/13/2019    HDL 59 11/13/2019    ALT 30 11/13/2019    AST 18 11/13/2019     11/13/2019    K 4.1 11/13/2019     11/13/2019    CREATININE 1.4 11/13/2019    BUN 13 11/13/2019    CO2 28 11/13/2019    INR 1.0 07/21/2006    HGBA1C 5.4 11/13/2019       Diagnostic Studies: No relevant studies.    EKG:    Results for orders placed or performed in visit on 03/19/19   SCHEDULED EKG 12-LEAD (to Muse)    Collection Time: 03/19/19  3:05 PM    Narrative    Test Reason : E87.5,    Vent. Rate : 064 BPM     Atrial Rate : 064 BPM     P-R Int : 188 ms          QRS Dur : 094 ms      QT Int : 374 ms       P-R-T Axes : 059 -43 025 degrees     QTc Int : 385 ms    Normal sinus rhythm  Left axis deviation  Incomplete right bundle branch block  Inferior infarct ,age undetermined is possible  Abnormal ECG  When compared with ECG of 21-JUL-2006 10:59,  Incomplete right bundle branch block is now Present  possible Inferior infarct is now Present  Nonspecific T wave abnormality, improved in Inferior leads  T wave inversion no longer evident in Lateral leads  Confirmed by Sanchez ABDI, Hector ([id number]),  Rosemarie Washington ([id number]) on  6/6/2019 11:10:04 AM    Referred By:             Confirmed By:Hector Bradford MD       2D ECHO:  TTE:  No results found for this or any previous visit.    JENNIFER:  No results found for this or any previous visit.    ASSESSMENT/PLAN:       Tammi London, RN   Registered Nurse      Pre Admission Screening   Signed            Anesthesia Assessment: Preoperative EQUATION     Planned Procedure: Procedure(s) (LRB):  XI ROBOTIC PROSTATECTOMY (N/A)  Requested Anesthesia Type:General  Surgeon: Jerardo Clements MD  Service: Urology  Known or anticipated Date of Surgery:11/18/2019     Surgeon notes: reviewed     Previous anesthesia records:Not available     Last PCP note: within 1 month , within [facility]  Dr. Brennan  Subspecialty notes: Urology     Other important co-morbidities: Per Epic:  HTN/HLP, DDD, Osteoarthritis, Prostate CA, prediabetic     Tests already available:  Available tests,  within 3 months , within [facility] .  9/17/2019 Lipid Panel, CMP, CBC, A1c; 3/19/19 EKG                            Instructions given. (See in Nurse's note)     Optimization:  Anesthesia Preop Clinic Assessment  Indicated.           "      Plan:    Testing:  T&S   Pre-anesthesia  visit                                        Visit focus: concerns in complex and/or prolonged anesthesia, position other than supine                           Patient  has previously scheduled Medical Appointment: 11/13/2019     Navigation: Tests Scheduled.                         Consults scheduled.                        Results will be tracked by Preop Clinic.            10/25/2019  Bay Yadav is a 68 y.o., male.    Anesthesia Evaluation    I have reviewed the Patient Summary Reports.    I have reviewed the Nursing Notes.   I have reviewed the Medications.   Steroids Taken In Past Year:     Review of Systems  Anesthesia Hx:  No problems with previous Anesthesia Tolerated anesthesia for multiple kidney stone procedures/lithotripsy Denies Family Hx of Anesthesia complications.   Denies Personal Hx of Anesthesia complications.   Social:  No Alcohol Use, Former Smoker  Denies Tobacco Use. Denies Alcohol Use.   Hematology/Oncology:  Hematology Normal       Prostate Current/Recent Cancer. Oncology Comments: Prostate cancer     EENT/Dental:   chronic allergic rhinitis   Cardiovascular:   Hypertension, well controlled Valvular problems/Murmurs Denies MI.  Denies CAD.     Denies Angina. hyperlipidemia  Functional Capacity good / => 4 METS  Denies Coronary Artery Disease.   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  Denies Asthma.  Denies Shortness of breath.  Denies Recent URI.  Denies Asthma.  Denies Chronic Obstructive Pulmonary Disease (COPD).  Possible Obstructive Sleep Apnea , (STOP/BANG) Symptoms P - Pressure being treated for high BP, G - Gender (Male > Female) and A " - Age > 50        Renal/:   renal calculi  Neoplasm/Tumor, Prostate Cancer.  Denies Kidney Function/Disease    Hepatic/GI:   GERD, well controlled Denies Liver Disease.  Esophageal / Stomach Disorders Gerd, Peptic Ulcer Disease, bleeding ulcers, in past Controlled by chronic antireflux medication.  Denies Liver Disease    Musculoskeletal:   Recent steroid injection for shoulder pain Musculoskeletal General/Symptoms: low back pain, neck pain. Functional capacity is ambulatory without assistance.  Joint Disease:  Arthritis, Osteoarthritis  Spine Disorders: cervical and lumbar Degenerative disease and Disc disease Degenerative Joint Disease Herniated Disc   Neurological:   Denies CVA. Denies Seizures.  Pain , onset is chronic , location of back and neck , alleviating factors are tramadol 1-4/day. Osteoarthritis, Low Back Pain  Denies Seizure Disorder  Denies CVA - Cerebrovasular Accident  Denies TIA - Transient Ischemic Attack    Endocrine:  Endocrine Normal Denies Diabetes. Denies Hypothyroidism. preDM- A1c 5.4 11/13/19 Denies Diabetes  Denies Thyroid Disease  Metabolic Disorders, Hyperlipoproteinemia, controlled on medication  Psych:   depression          Physical Exam  General:  Well nourished    Airway/Jaw/Neck:  Airway Findings: Mouth Opening: Normal Tongue: Normal  General Airway Assessment: Adult  Mallampati: III  Improves to II with phonation.  TM Distance: Normal, at least 6 cm  Jaw/Neck Findings:  Micrognathia: Negative Neck ROM: Decreased Lateral Motion, to the right, to the left      Dental:  Dental Findings: In tact   Chest/Lungs:  Chest/Lungs Findings: Clear to auscultation     Heart/Vascular:  Heart Findings: Rate: Normal  Rhythm: Regular Rhythm  Heart Murmur        Mental Status:  Mental Status Findings:  Cooperative, Alert and Oriented         Anesthesia Plan  Type of Anesthesia, risks & benefits discussed:  Anesthesia Type:  general, regional  Patient's Preference:   Intra-op Monitoring Plan:  standard ASA monitors  Intra-op Monitoring Plan Comments:   Post Op Pain Control Plan: per primary service following discharge from PACU, IV/PO Opioids PRN and multimodal analgesia  Post Op Pain Control Plan Comments:   Induction:   IV  Beta Blocker:  Patient is not currently on a Beta-Blocker (No further documentation required).       Informed Consent: Patient understands risks and agrees with Anesthesia plan.  Questions answered. Anesthesia consent signed with patient.  ASA Score: 2     Day of Surgery Review of History & Physical:    H&P update referred to the surgeon.         Ready For Surgery From Anesthesia Perspective.     11/13/19 preop visit completed, lab results noted, surgeon ordered T & S for am of surgery.

## 2019-10-27 DIAGNOSIS — K21.9 GASTROESOPHAGEAL REFLUX DISEASE, ESOPHAGITIS PRESENCE NOT SPECIFIED: ICD-10-CM

## 2019-10-28 RX ORDER — OMEPRAZOLE 40 MG/1
CAPSULE, DELAYED RELEASE ORAL
Qty: 90 CAPSULE | Refills: 1 | Status: SHIPPED | OUTPATIENT
Start: 2019-10-28 | End: 2020-04-22

## 2019-10-28 RX ORDER — MECLIZINE HYDROCHLORIDE 25 MG/1
TABLET ORAL
Qty: 30 TABLET | Refills: 1 | Status: SHIPPED | OUTPATIENT
Start: 2019-10-28

## 2019-11-07 ENCOUNTER — TELEPHONE (OUTPATIENT)
Dept: UROLOGY | Facility: CLINIC | Age: 69
End: 2019-11-07

## 2019-11-07 DIAGNOSIS — I10 ESSENTIAL HYPERTENSION, BENIGN: ICD-10-CM

## 2019-11-07 RX ORDER — LISINOPRIL 10 MG/1
TABLET ORAL
Qty: 90 TABLET | Refills: 3 | Status: SHIPPED | OUTPATIENT
Start: 2019-11-07 | End: 2020-10-27

## 2019-11-07 NOTE — TELEPHONE ENCOUNTER
----- Message from Karey Deutsch LPN sent at 11/5/2019  9:47 AM CST -----  Contact: [phone].  , please verify pre-biopsy psa for the prolaris test, thanks          From: Angelique D'Amico   Sent: Friday, October 25, 2019 3:41 PM  To: '[email or web address]' <[email or web address]>  Subject: PHI - Test on hold - verify correct PSA  Importance: High         [id number]/ Bay Yadav/ 1950         Lisandro Eden,         There appears to be a discrepancy on the PSA for this patient as we have 3 different ones.  Can you verify what the PRE-Biopsy PSA is for the 08-.         TRF                                         3.37    08-21-19 PATH                   2.71    07-11-19 PSA TEST           3.35         PRE-biopsy PSA  _________________         Kindest regards,    Angelique (Angel) D'Amico

## 2019-11-10 DIAGNOSIS — M51.36 DDD (DEGENERATIVE DISC DISEASE), LUMBAR: ICD-10-CM

## 2019-11-11 RX ORDER — TRAMADOL HYDROCHLORIDE 50 MG/1
TABLET ORAL
Qty: 120 TABLET | Refills: 2 | Status: SHIPPED | OUTPATIENT
Start: 2019-11-11 | End: 2020-02-04

## 2019-11-12 NOTE — DISCHARGE INSTRUCTIONS
Your surgery has been scheduled for:__________________________________________    You should report to:  ____[facility], located on the [place] side of the first floor of the           [facility] [phone])  ____[facility], located on the [facility] side of the            Second floor of the [facility] [phone])  ____3rd Floor [facility] located on the [facility] side of the [facility] [phone]  Please Note   - Tell your doctor if you take Aspirin, products containing Aspirin, herbal medications  or blood thinners, such as Coumadin, Ticlid, or Plavix.  (Consult your provider regarding holding or stopping before surgery).  - Arrange for someone to drive you home following surgery.  You will not be allowed to leave the surgical facility alone or drive yourself home following sedation and anesthesia.  Before Surgery  - Stop taking all herbal medications 14days prior to surgery  - No Motrin/Advil (Ibuprofen) 7 days before surgery  - No Aleve (Naproxen) 7 days before surgery  - Stop Taking Asprin, products containing Asprin _____days before surgery  - Stop taking blood thinners_______days before surgery  - No Goody's/BC  Powder 7 days before surgery  - Refrain from drinking alcoholic beverages for 24hours before and after surgery  - Stop or limit smoking _________days before surgery  - You may take Tylenol for pain  Night before Surgery  Stop ALL solid food, gum, candy (including vitamins) 8 hours before arrival time.  (Please note: If your surgeon gives you different eating and drinking instructions, please follow surgeon's directions.)  Stop all CLOUDY liquids: coffee with creamer, formula, tube feeds, cloudy juices, non-human milk and breast milk with additives, 6 hours prior to arrival time.  Stop plain breast milk 4 hours prior to arrival time.  The patient should be ENCOURAGED to drink carbohydrate-rich  clear liquids (sports drinks, clear juices) until 2 hours prior to arrival time.  CLEAR liquids include only water, black coffee NO creamer, clear oral rehydration drinks, clear sports drinks or clear fruit juices (no orange juice, no pulpy juices, no apple cider). Advise patients if they can read newsprint through the liquid, it qualifies as clear liquid.   IF IN DOUBT, drink water instead.   - Take a shower or bath (shower is recommended).  Bathe with Hibiclens soap or an antibacterial soap from the neck down.  If not supplied by your surgeon, hibiclens soap will need to be purchased over the counter in pharmacy.  Rinse soap off thoroughly.  - Shampoo your hair with your regular shampoo  The Day of Surgery  · NOTHING TO  DRINK 2 hours before arrival time. If you are told to take medication on the morning of surgery, it may be taken with a sip of water.   - Take another bath or shower with hibiclens or any antibacterial soap, to reduce the chance of infection.  - Take heart and blood pressure medications with a small sip of water, as advised by the perioperative team.  - Do not take fluid pills  - You may brush your teeth and rinse your mouth, but do not swall any additional water.   - Do not apply perfumes, powder, body lotions or deodorant on the day of surgery.  - Nail polish should be removed.  - Do not wear makeup or moisturizer  - Wear comfortable clothes, such as a button front shirt and loose fitting pants.  - Leave all jewelry, including body piercings, and valuables at home.    - Bring any devices you will neeed after surgery such as crutches or canes.  - If you have sleep apnea, please bring your CPAP machine  In the event that your physical condition changes including the onset of a cold or respiratory illness, or if you have to delay or cancel your surgery, please notify your surgeon.  Anesthesia: General Anesthesia     You are watched continuously during your procedure by your anesthesia provider.      Youre due to have surgery. During surgery, youll be given medicine called anesthesia or anesthetic. This will keep you comfortable and pain-free. Your anesthesia provider will use general anesthesia.  What is general anesthesia?  General anesthesia puts you into a state like deep sleep. It goes into the bloodstream (IV anesthetics), into the lungs (gas anesthetics), or both. You feel nothing during the procedure. You will not remember it. During the procedure, the anesthesia provider monitors you continuously. He or she checks your heart rate and rhythm, blood pressure, breathing, and blood oxygen.  · IV anesthetics. IV anesthetics are given through an IV line in your arm. Theyre often given first. This is so you are asleep before a gas anesthetic is started. Some kinds of IV anesthetics relieve pain. Others relax you. Your doctor will decide which kind is best in your case.  · Gas anesthetics. Gas anesthetics are breathed into the lungs. They are often used to keep you asleep. They can be given through a facemask or a tube placed in your larynx or trachea (breathing tube).  ? If you have a facemask, your anesthesia provider will most likely place it over your nose and mouth while youre still awake. Youll breathe oxygen through the mask as your IV anesthetic is started. Gas anesthetic may be added through the mask.  ? If you have a tube in the larynx or trachea, it will be inserted into your throat after youre asleep.  Anesthesia tools and medicines  You will likely have:  · IV anesthetics. These are put into an IV line into your bloodstream.  · Gas anesthetics. You breathe these anesthetics into your lungs, where they pass into your bloodstream.  · Pulse oximeter. This is a small clip that is attached to the end of your finger. This measures your blood oxygen level.  · Electrocardiography leads (electrodes). These are small sticky pads that are placed on your chest. They record your heart rate and  rhythm.  · Blood pressure cuff. This reads your blood pressure.  Risks and possible complications  General anesthesia has some risks. These include:  · Breathing problems  · Nausea and vomiting  · Sore throat or hoarseness (usually temporary)  · Allergic reaction to the anesthetic  · Irregular heartbeat (rare)  · Cardiac arrest (rare)   Anesthesia safety  · Follow all instructions you are given for how long not to eat or drink before your procedure.  · Be sure your doctor knows what medicines and drugs you take. This includes over-the-counter medicines, herbs, supplements, alcohol or other drugs. You will be asked when those were last taken.  · Have an adult family member or friend drive you home after the procedure.  · For the first 24 hours after your surgery:  ? Do not drive or use heavy equipment.  ? Do not make important decisions or sign legal documents. If important decisions or signing legal documents is necessary during the first 24 hours after surgery, have a trusted family member or spouse act on your behalf.  ? Avoid alcohol.  ? Have a responsible adult stay with you. He or she can watch for problems and help keep you safe.  Date Last Reviewed: 12/1/2016  © 0313-8751 [employer] [address] [place] [address], [place] [address]. All rights reserved. This information is not intended as a substitute for professional medical care. Always follow your healthcare professional's instructions

## 2019-11-13 ENCOUNTER — HOSPITAL ENCOUNTER (OUTPATIENT)
Dept: PREADMISSION TESTING | Facility: HOSPITAL | Age: 69
Discharge: HOME | End: 2019-11-13
Attending: UROLOGY
Payer: MEDICARE

## 2019-11-13 ENCOUNTER — OFFICE VISIT (OUTPATIENT)
Dept: UROLOGY | Facility: CLINIC | Age: 69
End: 2019-11-13
Payer: MEDICARE

## 2019-11-13 VITALS
RESPIRATION RATE: 18 BRPM | DIASTOLIC BLOOD PRESSURE: 79 MMHG | WEIGHT: 179 LBS | OXYGEN SATURATION: 98 % | TEMPERATURE: 98 F | BODY MASS INDEX: 28.09 KG/M2 | SYSTOLIC BLOOD PRESSURE: 137 MMHG | HEART RATE: 69 BPM | HEIGHT: 67 IN

## 2019-11-13 DIAGNOSIS — C61 PROSTATE CANCER: Primary | ICD-10-CM

## 2019-11-13 PROCEDURE — 87086 URINE CULTURE/COLONY COUNT: CPT

## 2019-11-13 PROCEDURE — 99499 UNLISTED E&M SERVICE: CPT | Mod: S$GLB,,, | Performed by: UROLOGY

## 2019-11-13 PROCEDURE — 87088 URINE BACTERIA CULTURE: CPT

## 2019-11-13 PROCEDURE — 99499 NO LOS: ICD-10-PCS | Mod: S$GLB,,, | Performed by: UROLOGY

## 2019-11-13 PROCEDURE — 87186 SC STD MICRODIL/AGAR DIL: CPT

## 2019-11-13 PROCEDURE — 87077 CULTURE AEROBIC IDENTIFY: CPT

## 2019-11-13 RX ORDER — DOXYCYCLINE HYCLATE 100 MG
100 TABLET ORAL 2 TIMES DAILY
Qty: 14 TABLET | Refills: 0 | Status: SHIPPED | OUTPATIENT
Start: 2019-11-13 | End: 2019-11-20

## 2019-11-13 RX ORDER — SODIUM CHLORIDE 9 MG/ML
INJECTION, SOLUTION INTRAVENOUS CONTINUOUS
Status: CANCELLED | OUTPATIENT
Start: 2019-11-13

## 2019-11-13 RX ORDER — PREGABALIN 25 MG/1
150 CAPSULE ORAL
Status: CANCELLED | OUTPATIENT
Start: 2019-11-13 | End: 2019-11-13

## 2019-11-13 RX ORDER — HEPARIN SODIUM 5000 [USP'U]/ML
5000 INJECTION, SOLUTION INTRAVENOUS; SUBCUTANEOUS EVERY 8 HOURS
Status: CANCELLED | OUTPATIENT
Start: 2019-11-13

## 2019-11-13 RX ORDER — ACETAMINOPHEN 500 MG
1000 TABLET ORAL
Status: CANCELLED | OUTPATIENT
Start: 2019-11-13

## 2019-11-13 RX ORDER — KETOROLAC TROMETHAMINE 30 MG/ML
30 INJECTION, SOLUTION INTRAMUSCULAR; INTRAVENOUS
Status: CANCELLED | OUTPATIENT
Start: 2019-11-13 | End: 2019-11-16

## 2019-11-13 NOTE — H&P (VIEW-ONLY)
Urology ([facility]) H&P for upcoming procedure  Staff:  Jerardo Clements MD    Is this patient in a research study?  No    CC: prostate adencarcinoma    HPI:  Bay Yadav is a 68 y.o. male with [id number] [place] 3+4=7 prostate adenocarcinoma.       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 Has peyronies.    ROS:  Neg except per HPI, specifically no bone pain, no unintentional weight loss, no anorexia, no night sweats    Past Medical History:   Diagnosis Date    Acid reflux     Cervical disc herniation     Depression     Heart murmur     Hyperlipidemia     Lumbar disc herniation     Nephrolithiasis     Upper GI bleed        Past Surgical History:   Procedure Laterality Date    EYE SURGERY         Social History     Socioeconomic History    Marital status:      Spouse name: Not on file    Number of children: Not on file    Years of education: Not on file    Highest education level: Not on file   Occupational History    Not on file   Social Needs    Financial resource strain: Not on file    Food insecurity:     Worry: Not on file     Inability: Not on file    Transportation needs:     Medical: Not on file     Non-medical: Not on file   Tobacco Use    Smoking status: Former Smoker    Smokeless tobacco: Never Used   Substance and Sexual Activity    Alcohol use: No    Drug use: No    Sexual activity: Not on file   Lifestyle    Physical activity:     Days per week: Not on file     Minutes per session: Not on file    Stress: Not on file   Relationships    Social connections:     Talks on phone: Not on file     Gets together: Not on file     Attends  Catholic service: Not on file     Active member of club or organization: Not on file     Attends meetings of clubs or organizations: Not on file     Relationship status: Not on file   Other Topics Concern    Not on file   Social History Narrative    Not on file       Family History   Problem Relation Age of Onset    Cancer Mother     Cancer Father     Cancer Brother        Review of patient's allergies indicates:   Allergen Reactions    Asa [aspirin]      Gastro bleed       Current Outpatient Medications on File Prior to Visit   Medication Sig Dispense Refill    acetaminophen/diphenhydramine (PERCOGESIC ORAL) Take by mouth.      butalbital-acetaminophen-caffeine -40 mg (FIORICET, ESGIC) -40 mg per tablet TAKE 1 TABLET BY MOUTH EVERY 6 HOURS AS NEEDED FOR HEADACHES 45 tablet 1    escitalopram oxalate (LEXAPRO) 10 MG tablet TAKE 1 TABLET(10 MG) BY MOUTH EVERY DAY 90 tablet 3    hydrocodone-homatropine 5-1.5 mg/5 ml (HYCODAN) 5-1.5 mg/5 mL Syrp Take 5 mLs by mouth every 6 (six) hours as needed (cough). (Patient not taking: Reported on 10/10/2019) 120 mL 0    lisinopril 10 MG tablet TAKE 1 TABLET(10 MG) BY MOUTH EVERY DAY 90 tablet 3    meclizine (ANTIVERT) 25 mg tablet TAKE 1 TABLET(25 MG) BY MOUTH THREE TIMES DAILY AS NEEDED 30 tablet 1    multivitamin with minerals tablet Take 1 tablet by mouth once daily.      omeprazole (PRILOSEC) 40 MG capsule TAKE 1 CAPSULE(40 MG) BY MOUTH EVERY DAY 90 capsule 1    rosuvastatin (CRESTOR) 20 MG tablet TAKE 1 TABLET(20 MG) BY MOUTH EVERY DAY 90 tablet 3    traMADol (ULTRAM) 50 mg tablet TAKE 1 TABLET BY MOUTH EVERY 6 HOURS AS NEEDED 120 tablet 2    vitB6/mag cit,ox/potassium cit (THERALITH XR ORAL) Take by mouth.       No current facility-administered medications on file prior to visit.        Anticoagulation:  No, but took Excedrin (containing ASA) 2 days ago    Physical Exam:  weight 182 lb/82 kg    There is no height or weight on file to calculate  BMI.    AAOx4, NAD, WDWN  NC/AT, EOMI, PER, sclerae anicteric, speech normal, tongue midline  Nl effort, CTAB  RRR  Soft, non-tender, non-distended   Scars: none  Prostate 30g, nontender, no nodules  Penis circumcised    Labs:  Urine dipstick today shows positive for nitrites, negative for blood and LE    Lab Results   Component Value Date    WBC 9.96 11/13/2019    HGB 13.5 (L) 11/13/2019    HCT 42.4 11/13/2019    MCV 91 11/13/2019     11/13/2019           BMP  Lab Results   Component Value Date     11/13/2019    K 4.1 11/13/2019     11/13/2019    CO2 28 11/13/2019    BUN 13 11/13/2019    CREATININE 1.4 11/13/2019    CALCIUM 9.7 11/13/2019    ANIONGAP 8 11/13/2019    ESTGFRAFRICA 59.7 (A) 11/13/2019    EGFRNONAA 51.6 (A) 11/13/2019       No results found for: PSA, PSADIAG, PSATOTAL, PSAFREE, PSAFREEPCT    Imaging:  mpMRI prostate (10/18/19) PIRADS4 lesion in left mid gland posterior peripheral zone; PIRADS 4 lesion in right mid gland anterior peripheral zone; negative for SV invasion, LAD, NVB involvement; 26 cc prostate      CT Abd and Pelvis (09/2019); bilateral adrenal glands unremarkable; no renal masses or hydronephrosis; multiple punctate nonobstructing stones bilaterally, largest 4 mm; bilateral ureters normal in course and caliber; bladder slightly distended; prostate unremarkable    Assessment: Bay Yadav is a 68 y.o. male with [id number] [place] 3+4=7 prostate adenocarcinoma.      Plan:   1. To OR on 11/18/2019 for RALP without BPLND  2. Consents signed   3. I have explained the risk, benefits, and alternatives of the procedure in detail. The patient voices understanding and all questions have been answered. The patient agrees to proceed as planned.   4. Urine culture. Will treat nitrite positive urine with empiric doxycycline to cover patient through surgery date; will tailor abx once culture results.      Reg Samuel MD

## 2019-11-13 NOTE — PROGRESS NOTES
Urology ([facility]) H&P for upcoming procedure  Staff:  Jerardo Clements MD    Is this patient in a research study?  No    CC: prostate adencarcinoma    HPI:  aBy Yadav is a 68 y.o. male with [id number] [place] 3+4=7 prostate adenocarcinoma.       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 Has peyronies.    ROS:  Neg except per HPI, specifically no bone pain, no unintentional weight loss, no anorexia, no night sweats    Past Medical History:   Diagnosis Date    Acid reflux     Cervical disc herniation     Depression     Heart murmur     Hyperlipidemia     Lumbar disc herniation     Nephrolithiasis     Upper GI bleed        Past Surgical History:   Procedure Laterality Date    EYE SURGERY         Social History     Socioeconomic History    Marital status:      Spouse name: Not on file    Number of children: Not on file    Years of education: Not on file    Highest education level: Not on file   Occupational History    Not on file   Social Needs    Financial resource strain: Not on file    Food insecurity:     Worry: Not on file     Inability: Not on file    Transportation needs:     Medical: Not on file     Non-medical: Not on file   Tobacco Use    Smoking status: Former Smoker    Smokeless tobacco: Never Used   Substance and Sexual Activity    Alcohol use: No    Drug use: No    Sexual activity: Not on file   Lifestyle    Physical activity:     Days per week: Not on file     Minutes per session: Not on file    Stress: Not on file   Relationships    Social connections:     Talks on phone: Not on file     Gets together: Not on file     Attends  Jew service: Not on file     Active member of club or organization: Not on file     Attends meetings of clubs or organizations: Not on file     Relationship status: Not on file   Other Topics Concern    Not on file   Social History Narrative    Not on file       Family History   Problem Relation Age of Onset    Cancer Mother     Cancer Father     Cancer Brother        Review of patient's allergies indicates:   Allergen Reactions    Asa [aspirin]      Gastro bleed       Current Outpatient Medications on File Prior to Visit   Medication Sig Dispense Refill    acetaminophen/diphenhydramine (PERCOGESIC ORAL) Take by mouth.      butalbital-acetaminophen-caffeine -40 mg (FIORICET, ESGIC) -40 mg per tablet TAKE 1 TABLET BY MOUTH EVERY 6 HOURS AS NEEDED FOR HEADACHES 45 tablet 1    escitalopram oxalate (LEXAPRO) 10 MG tablet TAKE 1 TABLET(10 MG) BY MOUTH EVERY DAY 90 tablet 3    hydrocodone-homatropine 5-1.5 mg/5 ml (HYCODAN) 5-1.5 mg/5 mL Syrp Take 5 mLs by mouth every 6 (six) hours as needed (cough). (Patient not taking: Reported on 10/10/2019) 120 mL 0    lisinopril 10 MG tablet TAKE 1 TABLET(10 MG) BY MOUTH EVERY DAY 90 tablet 3    meclizine (ANTIVERT) 25 mg tablet TAKE 1 TABLET(25 MG) BY MOUTH THREE TIMES DAILY AS NEEDED 30 tablet 1    multivitamin with minerals tablet Take 1 tablet by mouth once daily.      omeprazole (PRILOSEC) 40 MG capsule TAKE 1 CAPSULE(40 MG) BY MOUTH EVERY DAY 90 capsule 1    rosuvastatin (CRESTOR) 20 MG tablet TAKE 1 TABLET(20 MG) BY MOUTH EVERY DAY 90 tablet 3    traMADol (ULTRAM) 50 mg tablet TAKE 1 TABLET BY MOUTH EVERY 6 HOURS AS NEEDED 120 tablet 2    vitB6/mag cit,ox/potassium cit (THERALITH XR ORAL) Take by mouth.       No current facility-administered medications on file prior to visit.        Anticoagulation:  No, but took Excedrin (containing ASA) 2 days ago    Physical Exam:  weight 182 lb/82 kg    There is no height or weight on file to calculate  BMI.    AAOx4, NAD, WDWN  NC/AT, EOMI, PER, sclerae anicteric, speech normal, tongue midline  Nl effort, CTAB  RRR  Soft, non-tender, non-distended   Scars: none  Prostate 30g, nontender, no nodules  Penis circumcised    Labs:  Urine dipstick today shows positive for nitrites, negative for blood and LE    Lab Results   Component Value Date    WBC 9.96 11/13/2019    HGB 13.5 (L) 11/13/2019    HCT 42.4 11/13/2019    MCV 91 11/13/2019     11/13/2019           BMP  Lab Results   Component Value Date     11/13/2019    K 4.1 11/13/2019     11/13/2019    CO2 28 11/13/2019    BUN 13 11/13/2019    CREATININE 1.4 11/13/2019    CALCIUM 9.7 11/13/2019    ANIONGAP 8 11/13/2019    ESTGFRAFRICA 59.7 (A) 11/13/2019    EGFRNONAA 51.6 (A) 11/13/2019       No results found for: PSA, PSADIAG, PSATOTAL, PSAFREE, PSAFREEPCT    Imaging:  mpMRI prostate (10/18/19) PIRADS4 lesion in left mid gland posterior peripheral zone; PIRADS 4 lesion in right mid gland anterior peripheral zone; negative for SV invasion, LAD, NVB involvement; 26 cc prostate      CT Abd and Pelvis (09/2019); bilateral adrenal glands unremarkable; no renal masses or hydronephrosis; multiple punctate nonobstructing stones bilaterally, largest 4 mm; bilateral ureters normal in course and caliber; bladder slightly distended; prostate unremarkable    Assessment: Bay Yadav is a 68 y.o. male with [id number] [place] 3+4=7 prostate adenocarcinoma.      Plan:   1. To OR on 11/18/2019 for RALP without BPLND  2. Consents signed   3. I have explained the risk, benefits, and alternatives of the procedure in detail. The patient voices understanding and all questions have been answered. The patient agrees to proceed as planned.   4. Urine culture. Will treat nitrite positive urine with empiric doxycycline to cover patient through surgery date; will tailor abx once culture results.      Reg Samuel MD

## 2019-11-15 ENCOUNTER — TELEPHONE (OUTPATIENT)
Dept: UROLOGY | Facility: CLINIC | Age: 69
End: 2019-11-15

## 2019-11-15 NOTE — TELEPHONE ENCOUNTER
Called patient to confirm surgery with Dr. Clements on 11/18/2019.  Give arrival time of 5am and instructions as follows.    Pt should be on clear liquids the whole day prior to surgery.  Pt should do 2 rectal fleet enemas anytime on the day prior to surgery. Pt must take a shower or bath with Hibiclens soap the night before surgery and also the morning of surgery.  The pt must also drink a 12oz bottle of Gatorade or Powerade 2 to 3 hours prior to the arrival time of surgery.  Also, pt was instructed to continue Kegel exercises.  Pt verbalized understanding.

## 2019-11-16 LAB — BACTERIA UR CULT: ABNORMAL

## 2019-11-18 ENCOUNTER — HOSPITAL ENCOUNTER (OUTPATIENT)
Facility: HOSPITAL | Age: 69
LOS: 1 days | Discharge: HOME | End: 2019-11-19
Attending: UROLOGY | Admitting: UROLOGY
Payer: MEDICARE

## 2019-11-18 ENCOUNTER — ANESTHESIA (OUTPATIENT)
Dept: SURGERY | Facility: HOSPITAL | Age: 69
End: 2019-11-18
Payer: MEDICARE

## 2019-11-18 DIAGNOSIS — C61 PROSTATE CANCER: Primary | ICD-10-CM

## 2019-11-18 LAB
ABO + RH BLD: NORMAL
BLD GP AB SCN CELLS X3 SERPL QL: NORMAL

## 2019-11-18 PROCEDURE — 25000003 PHARM REV CODE 250: Performed by: STUDENT IN AN ORGANIZED HEALTH CARE EDUCATION/TRAINING PROGRAM

## 2019-11-18 PROCEDURE — 63600175 PHARM REV CODE 636 W HCPCS: Performed by: STUDENT IN AN ORGANIZED HEALTH CARE EDUCATION/TRAINING PROGRAM

## 2019-11-18 PROCEDURE — 86850 RBC ANTIBODY SCREEN: CPT

## 2019-11-18 PROCEDURE — 88307 TISSUE EXAM BY PATHOLOGIST: CPT | Performed by: PATHOLOGY

## 2019-11-18 PROCEDURE — 88307 TISSUE EXAM BY PATHOLOGIST: CPT | Mod: 26,,, | Performed by: PATHOLOGY

## 2019-11-18 PROCEDURE — 94761 N-INVAS EAR/PLS OXIMETRY MLT: CPT

## 2019-11-18 PROCEDURE — 64461 ERECTOR SPINAE PLANE SINGLE INJECTION BLOCK: ICD-10-PCS | Mod: 59,50,, | Performed by: ANESTHESIOLOGY

## 2019-11-18 PROCEDURE — 71000033 HC RECOVERY, INTIAL HOUR: Performed by: UROLOGY

## 2019-11-18 PROCEDURE — 88309 TISSUE EXAM BY PATHOLOGIST: CPT | Performed by: PATHOLOGY

## 2019-11-18 PROCEDURE — 63600175 PHARM REV CODE 636 W HCPCS: Performed by: ANESTHESIOLOGY

## 2019-11-18 PROCEDURE — 55866 PR LAP,PROSTATECTOMY,RADICAL,W/NERVE SPARE,INCL ROBOTIC: ICD-10-PCS | Mod: ,,, | Performed by: UROLOGY

## 2019-11-18 PROCEDURE — 38571 PR LAP,PELVIC LYMPHADENECTOMY: ICD-10-PCS | Mod: 51,,, | Performed by: UROLOGY

## 2019-11-18 PROCEDURE — 36000713 HC OR TIME LEV V EA ADD 15 MIN: Performed by: UROLOGY

## 2019-11-18 PROCEDURE — 88307 PR  SURG PATH,LEVEL V: ICD-10-PCS | Mod: 26,,, | Performed by: PATHOLOGY

## 2019-11-18 PROCEDURE — D9220A PRA ANESTHESIA: Mod: ,,, | Performed by: ANESTHESIOLOGY

## 2019-11-18 PROCEDURE — 25000003 PHARM REV CODE 250: Performed by: UROLOGY

## 2019-11-18 PROCEDURE — C1729 CATH, DRAINAGE: HCPCS | Performed by: UROLOGY

## 2019-11-18 PROCEDURE — 37000008 HC ANESTHESIA 1ST 15 MINUTES: Performed by: UROLOGY

## 2019-11-18 PROCEDURE — 36000712 HC OR TIME LEV V 1ST 15 MIN: Performed by: UROLOGY

## 2019-11-18 PROCEDURE — 55866 LAPS SURG PRST8ECT RPBIC RAD: CPT | Mod: ,,, | Performed by: UROLOGY

## 2019-11-18 PROCEDURE — 38571 LAPAROSCOPY LYMPHADENECTOMY: CPT | Mod: 51,,, | Performed by: UROLOGY

## 2019-11-18 PROCEDURE — D9220A PRA ANESTHESIA: ICD-10-PCS | Mod: ,,, | Performed by: ANESTHESIOLOGY

## 2019-11-18 PROCEDURE — 37000009 HC ANESTHESIA EA ADD 15 MINS: Performed by: UROLOGY

## 2019-11-18 PROCEDURE — 88305 TISSUE EXAM BY PATHOLOGIST: CPT | Mod: 59 | Performed by: PATHOLOGY

## 2019-11-18 PROCEDURE — 88309 TISSUE EXAM BY PATHOLOGIST: CPT | Mod: 26,,, | Performed by: PATHOLOGY

## 2019-11-18 PROCEDURE — 88309 PR  SURG PATH,LEVEL VI: ICD-10-PCS | Mod: 26,,, | Performed by: PATHOLOGY

## 2019-11-18 PROCEDURE — 88305 TISSUE EXAM BY PATHOLOGIST: CPT | Mod: 26,,, | Performed by: PATHOLOGY

## 2019-11-18 PROCEDURE — 64461 PVB THORACIC SINGLE INJ SITE: CPT | Mod: 59 | Performed by: STUDENT IN AN ORGANIZED HEALTH CARE EDUCATION/TRAINING PROGRAM

## 2019-11-18 PROCEDURE — 99900035 HC TECH TIME PER 15 MIN (STAT)

## 2019-11-18 PROCEDURE — 94799 UNLISTED PULMONARY SVC/PX: CPT

## 2019-11-18 PROCEDURE — 64461 PVB THORACIC SINGLE INJ SITE: CPT | Mod: 59,50,, | Performed by: ANESTHESIOLOGY

## 2019-11-18 PROCEDURE — 88305 TISSUE EXAM BY PATHOLOGIST: ICD-10-PCS | Mod: 26,,, | Performed by: PATHOLOGY

## 2019-11-18 PROCEDURE — 27201423 OPTIME MED/SURG SUP & DEVICES STERILE SUPPLY: Performed by: UROLOGY

## 2019-11-18 RX ORDER — ACETAMINOPHEN 500 MG
1000 TABLET ORAL EVERY 6 HOURS
Status: DISCONTINUED | OUTPATIENT
Start: 2019-11-18 | End: 2019-11-19 | Stop reason: HOSPADM

## 2019-11-18 RX ORDER — GLYCOPYRROLATE 0.2 MG/ML
INJECTION INTRAMUSCULAR; INTRAVENOUS
Status: DISCONTINUED | OUTPATIENT
Start: 2019-11-18 | End: 2019-11-18

## 2019-11-18 RX ORDER — SODIUM CHLORIDE 9 MG/ML
INJECTION, SOLUTION INTRAVENOUS CONTINUOUS
Status: DISCONTINUED | OUTPATIENT
Start: 2019-11-18 | End: 2019-11-19

## 2019-11-18 RX ORDER — MIDAZOLAM HYDROCHLORIDE 1 MG/ML
0.5 INJECTION INTRAMUSCULAR; INTRAVENOUS
Status: DISCONTINUED | OUTPATIENT
Start: 2019-11-18 | End: 2019-11-18

## 2019-11-18 RX ORDER — ROSUVASTATIN CALCIUM 20 MG/1
20 TABLET, COATED ORAL NIGHTLY
Status: DISCONTINUED | OUTPATIENT
Start: 2019-11-18 | End: 2019-11-19 | Stop reason: HOSPADM

## 2019-11-18 RX ORDER — SUCCINYLCHOLINE CHLORIDE 20 MG/ML
INJECTION INTRAMUSCULAR; INTRAVENOUS
Status: DISCONTINUED | OUTPATIENT
Start: 2019-11-18 | End: 2019-11-18

## 2019-11-18 RX ORDER — HEPARIN SODIUM 5000 [USP'U]/ML
5000 INJECTION, SOLUTION INTRAVENOUS; SUBCUTANEOUS EVERY 8 HOURS
Status: DISCONTINUED | OUTPATIENT
Start: 2019-11-18 | End: 2019-11-18

## 2019-11-18 RX ORDER — ONDANSETRON 8 MG/1
8 TABLET, ORALLY DISINTEGRATING ORAL EVERY 6 HOURS PRN
Status: DISCONTINUED | OUTPATIENT
Start: 2019-11-18 | End: 2019-11-19 | Stop reason: HOSPADM

## 2019-11-18 RX ORDER — LIDOCAINE HCL/PF 100 MG/5ML
SYRINGE (ML) INTRAVENOUS
Status: DISCONTINUED | OUTPATIENT
Start: 2019-11-18 | End: 2019-11-18

## 2019-11-18 RX ORDER — KETAMINE HCL IN 0.9 % NACL 50 MG/5 ML
SYRINGE (ML) INTRAVENOUS
Status: DISCONTINUED | OUTPATIENT
Start: 2019-11-18 | End: 2019-11-18

## 2019-11-18 RX ORDER — PREGABALIN 150 MG/1
150 CAPSULE ORAL
Status: COMPLETED | OUTPATIENT
Start: 2019-11-18 | End: 2019-11-18

## 2019-11-18 RX ORDER — ACETAMINOPHEN 500 MG
1000 TABLET ORAL
Status: DISCONTINUED | OUTPATIENT
Start: 2019-11-18 | End: 2019-11-18

## 2019-11-18 RX ORDER — ROPIVACAINE HYDROCHLORIDE 7.5 MG/ML
INJECTION, SOLUTION EPIDURAL; PERINEURAL
Status: COMPLETED | OUTPATIENT
Start: 2019-11-18 | End: 2019-11-18

## 2019-11-18 RX ORDER — KETOROLAC TROMETHAMINE 30 MG/ML
30 INJECTION, SOLUTION INTRAMUSCULAR; INTRAVENOUS
Status: DISCONTINUED | OUTPATIENT
Start: 2019-11-18 | End: 2019-11-18

## 2019-11-18 RX ORDER — SODIUM CHLORIDE 0.9 % (FLUSH) 0.9 %
10 SYRINGE (ML) INJECTION
Status: DISCONTINUED | OUTPATIENT
Start: 2019-11-18 | End: 2019-11-18 | Stop reason: HOSPADM

## 2019-11-18 RX ORDER — HEPARIN SODIUM 5000 [USP'U]/ML
5000 INJECTION, SOLUTION INTRAVENOUS; SUBCUTANEOUS EVERY 8 HOURS
Status: DISCONTINUED | OUTPATIENT
Start: 2019-11-18 | End: 2019-11-19

## 2019-11-18 RX ORDER — ONDANSETRON 2 MG/ML
INJECTION INTRAMUSCULAR; INTRAVENOUS
Status: DISCONTINUED | OUTPATIENT
Start: 2019-11-18 | End: 2019-11-18

## 2019-11-18 RX ORDER — ROCURONIUM BROMIDE 10 MG/ML
INJECTION, SOLUTION INTRAVENOUS
Status: DISCONTINUED | OUTPATIENT
Start: 2019-11-18 | End: 2019-11-18

## 2019-11-18 RX ORDER — MECLIZINE HYDROCHLORIDE 25 MG/1
25 TABLET ORAL 3 TIMES DAILY PRN
Status: DISCONTINUED | OUTPATIENT
Start: 2019-11-18 | End: 2019-11-19 | Stop reason: HOSPADM

## 2019-11-18 RX ORDER — SODIUM CHLORIDE 0.9 % (FLUSH) 0.9 %
10 SYRINGE (ML) INJECTION
Status: DISCONTINUED | OUTPATIENT
Start: 2019-11-18 | End: 2019-11-19 | Stop reason: HOSPADM

## 2019-11-18 RX ORDER — FENTANYL CITRATE 50 UG/ML
25 INJECTION, SOLUTION INTRAMUSCULAR; INTRAVENOUS EVERY 5 MIN PRN
Status: DISCONTINUED | OUTPATIENT
Start: 2019-11-18 | End: 2019-11-18

## 2019-11-18 RX ORDER — OXYCODONE HYDROCHLORIDE 5 MG/1
5 TABLET ORAL EVERY 4 HOURS PRN
Status: DISCONTINUED | OUTPATIENT
Start: 2019-11-18 | End: 2019-11-19 | Stop reason: HOSPADM

## 2019-11-18 RX ORDER — LISINOPRIL 10 MG/1
10 TABLET ORAL NIGHTLY
Status: DISCONTINUED | OUTPATIENT
Start: 2019-11-18 | End: 2019-11-19 | Stop reason: HOSPADM

## 2019-11-18 RX ORDER — HYDROMORPHONE HYDROCHLORIDE 1 MG/ML
0.2 INJECTION, SOLUTION INTRAMUSCULAR; INTRAVENOUS; SUBCUTANEOUS EVERY 5 MIN PRN
Status: DISCONTINUED | OUTPATIENT
Start: 2019-11-18 | End: 2019-11-18 | Stop reason: HOSPADM

## 2019-11-18 RX ORDER — DOXYCYCLINE HYCLATE 100 MG
100 TABLET ORAL 2 TIMES DAILY
Status: DISCONTINUED | OUTPATIENT
Start: 2019-11-18 | End: 2019-11-19 | Stop reason: HOSPADM

## 2019-11-18 RX ORDER — KETOROLAC TROMETHAMINE 30 MG/ML
15 INJECTION, SOLUTION INTRAMUSCULAR; INTRAVENOUS EVERY 8 HOURS
Status: DISCONTINUED | OUTPATIENT
Start: 2019-11-18 | End: 2019-11-19 | Stop reason: HOSPADM

## 2019-11-18 RX ORDER — PANTOPRAZOLE SODIUM 40 MG/1
40 TABLET, DELAYED RELEASE ORAL DAILY
Status: DISCONTINUED | OUTPATIENT
Start: 2019-11-18 | End: 2019-11-19 | Stop reason: HOSPADM

## 2019-11-18 RX ORDER — PROPOFOL 10 MG/ML
VIAL (ML) INTRAVENOUS
Status: DISCONTINUED | OUTPATIENT
Start: 2019-11-18 | End: 2019-11-18

## 2019-11-18 RX ORDER — VASOPRESSIN 20 [USP'U]/ML
INJECTION, SOLUTION INTRAMUSCULAR; SUBCUTANEOUS
Status: DISCONTINUED | OUTPATIENT
Start: 2019-11-18 | End: 2019-11-18

## 2019-11-18 RX ORDER — CEFAZOLIN SODIUM 1 G/3ML
2 INJECTION, POWDER, FOR SOLUTION INTRAMUSCULAR; INTRAVENOUS
Status: COMPLETED | OUTPATIENT
Start: 2019-11-18 | End: 2019-11-18

## 2019-11-18 RX ORDER — ESCITALOPRAM OXALATE 10 MG/1
10 TABLET ORAL NIGHTLY
Status: DISCONTINUED | OUTPATIENT
Start: 2019-11-18 | End: 2019-11-19 | Stop reason: HOSPADM

## 2019-11-18 RX ORDER — POLYETHYLENE GLYCOL 3350 17 G/17G
17 POWDER, FOR SOLUTION ORAL DAILY
Status: DISCONTINUED | OUTPATIENT
Start: 2019-11-18 | End: 2019-11-19 | Stop reason: HOSPADM

## 2019-11-18 RX ORDER — NEOSTIGMINE METHYLSULFATE 1 MG/ML
INJECTION, SOLUTION INTRAVENOUS
Status: DISCONTINUED | OUTPATIENT
Start: 2019-11-18 | End: 2019-11-18

## 2019-11-18 RX ORDER — METHOCARBAMOL 500 MG/1
1000 TABLET, FILM COATED ORAL EVERY 6 HOURS PRN
Status: DISCONTINUED | OUTPATIENT
Start: 2019-11-18 | End: 2019-11-19 | Stop reason: HOSPADM

## 2019-11-18 RX ORDER — LIDOCAINE HYDROCHLORIDE 10 MG/ML
1 INJECTION, SOLUTION EPIDURAL; INFILTRATION; INTRACAUDAL; PERINEURAL ONCE
Status: COMPLETED | OUTPATIENT
Start: 2019-11-18 | End: 2019-11-18

## 2019-11-18 RX ORDER — FENTANYL CITRATE 50 UG/ML
INJECTION, SOLUTION INTRAMUSCULAR; INTRAVENOUS
Status: DISCONTINUED | OUTPATIENT
Start: 2019-11-18 | End: 2019-11-18

## 2019-11-18 RX ORDER — DEXAMETHASONE SODIUM PHOSPHATE 4 MG/ML
INJECTION, SOLUTION INTRA-ARTICULAR; INTRALESIONAL; INTRAMUSCULAR; INTRAVENOUS; SOFT TISSUE
Status: DISCONTINUED | OUTPATIENT
Start: 2019-11-18 | End: 2019-11-18

## 2019-11-18 RX ORDER — PHENYLEPHRINE HYDROCHLORIDE 10 MG/ML
INJECTION INTRAVENOUS
Status: DISCONTINUED | OUTPATIENT
Start: 2019-11-18 | End: 2019-11-18

## 2019-11-18 RX ORDER — ACETAMINOPHEN 500 MG
1000 TABLET ORAL
Status: COMPLETED | OUTPATIENT
Start: 2019-11-18 | End: 2019-11-18

## 2019-11-18 RX ORDER — SODIUM CHLORIDE 9 MG/ML
INJECTION, SOLUTION INTRAVENOUS CONTINUOUS
Status: DISCONTINUED | OUTPATIENT
Start: 2019-11-18 | End: 2019-11-18

## 2019-11-18 RX ORDER — PREGABALIN 50 MG/1
150 CAPSULE ORAL NIGHTLY
Status: DISCONTINUED | OUTPATIENT
Start: 2019-11-18 | End: 2019-11-19 | Stop reason: HOSPADM

## 2019-11-18 RX ADMIN — SODIUM CHLORIDE: 0.9 INJECTION, SOLUTION INTRAVENOUS at 10:11

## 2019-11-18 RX ADMIN — VASOPRESSIN 1 UNITS: 20 INJECTION INTRAVENOUS at 09:11

## 2019-11-18 RX ADMIN — ROCURONIUM BROMIDE 10 MG: 10 INJECTION, SOLUTION INTRAVENOUS at 08:11

## 2019-11-18 RX ADMIN — SODIUM CHLORIDE 500 ML: 0.9 INJECTION, SOLUTION INTRAVENOUS at 10:11

## 2019-11-18 RX ADMIN — ROCURONIUM BROMIDE 5 MG: 10 INJECTION, SOLUTION INTRAVENOUS at 06:11

## 2019-11-18 RX ADMIN — PANTOPRAZOLE SODIUM 40 MG: 40 TABLET, DELAYED RELEASE ORAL at 10:11

## 2019-11-18 RX ADMIN — MIDAZOLAM HYDROCHLORIDE 2 MG: 1 INJECTION, SOLUTION INTRAMUSCULAR; INTRAVENOUS at 06:11

## 2019-11-18 RX ADMIN — PHENYLEPHRINE HYDROCHLORIDE 100 MCG: 10 INJECTION INTRAVENOUS at 08:11

## 2019-11-18 RX ADMIN — EPHEDRINE SULFATE 5 MG: 50 INJECTION INTRAMUSCULAR; INTRAVENOUS; SUBCUTANEOUS at 08:11

## 2019-11-18 RX ADMIN — CEFAZOLIN 2 G: 330 INJECTION, POWDER, FOR SOLUTION INTRAMUSCULAR; INTRAVENOUS at 07:11

## 2019-11-18 RX ADMIN — ROPIVACAINE HYDROCHLORIDE 30 ML: 7.5 INJECTION, SOLUTION EPIDURAL; PERINEURAL at 06:11

## 2019-11-18 RX ADMIN — KETOROLAC TROMETHAMINE 15 MG: 30 INJECTION, SOLUTION INTRAMUSCULAR; INTRAVENOUS at 02:11

## 2019-11-18 RX ADMIN — HYDROMORPHONE HYDROCHLORIDE 0.2 MG: 1 INJECTION, SOLUTION INTRAMUSCULAR; INTRAVENOUS; SUBCUTANEOUS at 10:11

## 2019-11-18 RX ADMIN — DEXAMETHASONE SODIUM PHOSPHATE 4 MG: 4 INJECTION, SOLUTION INTRAMUSCULAR; INTRAVENOUS at 07:11

## 2019-11-18 RX ADMIN — SODIUM CHLORIDE: 0.9 INJECTION, SOLUTION INTRAVENOUS at 05:11

## 2019-11-18 RX ADMIN — SUCCINYLCHOLINE CHLORIDE 160 MG: 20 INJECTION, SOLUTION INTRAMUSCULAR; INTRAVENOUS at 06:11

## 2019-11-18 RX ADMIN — POLYETHYLENE GLYCOL 3350 17 G: 17 POWDER, FOR SOLUTION ORAL at 10:11

## 2019-11-18 RX ADMIN — ROCURONIUM BROMIDE 25 MG: 10 INJECTION, SOLUTION INTRAVENOUS at 07:11

## 2019-11-18 RX ADMIN — EPHEDRINE SULFATE 10 MG: 50 INJECTION INTRAMUSCULAR; INTRAVENOUS; SUBCUTANEOUS at 09:11

## 2019-11-18 RX ADMIN — GLYCOPYRROLATE 0.2 MG: 0.2 INJECTION, SOLUTION INTRAMUSCULAR; INTRAVENOUS at 07:11

## 2019-11-18 RX ADMIN — HEPARIN SODIUM 5000 UNITS: 5000 INJECTION, SOLUTION INTRAVENOUS; SUBCUTANEOUS at 02:11

## 2019-11-18 RX ADMIN — ROCURONIUM BROMIDE 20 MG: 10 INJECTION, SOLUTION INTRAVENOUS at 07:11

## 2019-11-18 RX ADMIN — OXYCODONE HYDROCHLORIDE 5 MG: 5 TABLET ORAL at 10:11

## 2019-11-18 RX ADMIN — HYDROMORPHONE HYDROCHLORIDE 0.2 MG: 1 INJECTION, SOLUTION INTRAMUSCULAR; INTRAVENOUS; SUBCUTANEOUS at 11:11

## 2019-11-18 RX ADMIN — FENTANYL CITRATE 25 MCG: 50 INJECTION, SOLUTION INTRAMUSCULAR; INTRAVENOUS at 10:11

## 2019-11-18 RX ADMIN — ACETAMINOPHEN 1000 MG: 500 TABLET ORAL at 01:11

## 2019-11-18 RX ADMIN — PHENYLEPHRINE HYDROCHLORIDE 100 MCG: 10 INJECTION INTRAVENOUS at 07:11

## 2019-11-18 RX ADMIN — SODIUM CHLORIDE, SODIUM GLUCONATE, SODIUM ACETATE, POTASSIUM CHLORIDE, MAGNESIUM CHLORIDE, SODIUM PHOSPHATE, DIBASIC, AND POTASSIUM PHOSPHATE: .53; .5; .37; .037; .03; .012; .00082 INJECTION, SOLUTION INTRAVENOUS at 08:11

## 2019-11-18 RX ADMIN — ESCITALOPRAM OXALATE 10 MG: 10 TABLET ORAL at 08:11

## 2019-11-18 RX ADMIN — ACETAMINOPHEN 1000 MG: 500 TABLET ORAL at 05:11

## 2019-11-18 RX ADMIN — LIDOCAINE HYDROCHLORIDE 80 MG: 20 INJECTION, SOLUTION INTRAVENOUS at 06:11

## 2019-11-18 RX ADMIN — EPHEDRINE SULFATE 10 MG: 50 INJECTION INTRAMUSCULAR; INTRAVENOUS; SUBCUTANEOUS at 08:11

## 2019-11-18 RX ADMIN — PREGABALIN 150 MG: 150 CAPSULE ORAL at 05:11

## 2019-11-18 RX ADMIN — SODIUM CHLORIDE, SODIUM GLUCONATE, SODIUM ACETATE, POTASSIUM CHLORIDE, MAGNESIUM CHLORIDE, SODIUM PHOSPHATE, DIBASIC, AND POTASSIUM PHOSPHATE: .53; .5; .37; .037; .03; .012; .00082 INJECTION, SOLUTION INTRAVENOUS at 07:11

## 2019-11-18 RX ADMIN — SODIUM CHLORIDE 500 ML: 0.9 INJECTION, SOLUTION INTRAVENOUS at 11:11

## 2019-11-18 RX ADMIN — ROSUVASTATIN CALCIUM 20 MG: 20 TABLET, FILM COATED ORAL at 08:11

## 2019-11-18 RX ADMIN — Medication 20 MG: at 07:11

## 2019-11-18 RX ADMIN — LIDOCAINE HYDROCHLORIDE 2 MG: 10 INJECTION, SOLUTION EPIDURAL; INFILTRATION; INTRACAUDAL; PERINEURAL at 05:11

## 2019-11-18 RX ADMIN — KETOROLAC TROMETHAMINE 15 MG: 30 INJECTION, SOLUTION INTRAMUSCULAR; INTRAVENOUS at 10:11

## 2019-11-18 RX ADMIN — DOXYCYCLINE HYCLATE 100 MG: 100 TABLET, COATED ORAL at 08:11

## 2019-11-18 RX ADMIN — ONDANSETRON 4 MG: 2 INJECTION INTRAMUSCULAR; INTRAVENOUS at 09:11

## 2019-11-18 RX ADMIN — ROCURONIUM BROMIDE 10 MG: 10 INJECTION, SOLUTION INTRAVENOUS at 09:11

## 2019-11-18 RX ADMIN — METHOCARBAMOL TABLETS 1000 MG: 500 TABLET, COATED ORAL at 02:11

## 2019-11-18 RX ADMIN — KETOROLAC TROMETHAMINE 30 MG: 30 INJECTION, SOLUTION INTRAMUSCULAR; INTRAVENOUS at 05:11

## 2019-11-18 RX ADMIN — METHOCARBAMOL TABLETS 1000 MG: 500 TABLET, COATED ORAL at 08:11

## 2019-11-18 RX ADMIN — FENTANYL CITRATE 50 MCG: 50 INJECTION INTRAMUSCULAR; INTRAVENOUS at 06:11

## 2019-11-18 RX ADMIN — GLYCOPYRROLATE 0.4 MG: 0.2 INJECTION, SOLUTION INTRAMUSCULAR; INTRAVENOUS at 09:11

## 2019-11-18 RX ADMIN — Medication 10 MG: at 09:11

## 2019-11-18 RX ADMIN — NEOSTIGMINE METHYLSULFATE 3 MG: 1 INJECTION INTRAVENOUS at 09:11

## 2019-11-18 RX ADMIN — OXYCODONE HYDROCHLORIDE 5 MG: 5 TABLET ORAL at 05:11

## 2019-11-18 RX ADMIN — PROPOFOL 140 MG: 10 INJECTION, EMULSION INTRAVENOUS at 06:11

## 2019-11-18 RX ADMIN — DOXYCYCLINE HYCLATE 100 MG: 100 TABLET, COATED ORAL at 10:11

## 2019-11-18 RX ADMIN — HEPARIN SODIUM 5000 UNITS: 5000 INJECTION, SOLUTION INTRAVENOUS; SUBCUTANEOUS at 10:11

## 2019-11-18 RX ADMIN — HEPARIN SODIUM 5000 UNITS: 5000 INJECTION, SOLUTION INTRAVENOUS; SUBCUTANEOUS at 05:11

## 2019-11-18 RX ADMIN — PROPOFOL 30 MG: 10 INJECTION, EMULSION INTRAVENOUS at 09:11

## 2019-11-18 RX ADMIN — PREGABALIN 150 MG: 50 CAPSULE ORAL at 08:11

## 2019-11-18 RX ADMIN — Medication 10 MG: at 08:11

## 2019-11-18 NOTE — NURSING TRANSFER
Nursing Transfer Note      11/18/2019     Transfer To: [id number]    Transfer via stretcher    Transfer with ivf    Transported by pct    Medicines sent: ivf    Chart send with patient: Yes    Notified: spouse    Patient reassessed at: 11/18/19

## 2019-11-18 NOTE — ANESTHESIA PROCEDURE NOTES
Intubation  Performed by: Stalin Zambrano MD  Authorized by: Walt Krueger MD     Intubation:     Induction:  Intravenous    Intubated:  Postinduction    Mask Ventilation:  Easy mask    Attempts:  1    Attempted By:  Resident anesthesiologist    Method of Intubation:  Direct    Blade:  Ordaz 2    Laryngeal View Grade: Grade I - full view of chords      Difficult Airway Encountered?: No      Complications:  None    Airway Device:  Oral endotracheal tube    Airway Device Size:  7.5    Style/Cuff Inflation:  Cuffed (inflated to minimal occlusive pressure)    Inflation Amount (mL):  6    Tube secured:  24    Secured at:  The lips    Placement Verified By:  Capnometry    Complicating Factors:  None    Findings Post-Intubation:  BS equal bilateral

## 2019-11-18 NOTE — PLAN OF CARE
Robot time out completed with pre-incision time out.  All DaVinci instruments inspected before case by Adria Cooks..  All instruments appear intact.    All DaVinci instruments inspected after case by Adria Cooks.  All Instruments appear intact.  Instrument lives checked Sheridan Laurent.

## 2019-11-18 NOTE — ANESTHESIA POSTPROCEDURE EVALUATION
Anesthesia Post Evaluation    Patient: Bay Yadav    Procedure(s) Performed: Procedure(s) (LRB):  XI ROBOTIC PROSTATECTOMY (N/A)  XI ROBOTIC LYMPHADENECTOMY, PELVIC (N/A)    Final Anesthesia Type: general    Patient location during evaluation: PACU  Patient participation: Yes- Able to Participate  Level of consciousness: awake and alert and oriented  Post-procedure vital signs: reviewed and stable  Pain management: adequate  Airway patency: patent  RONEL mitigation strategies: Multimodal analgesia, Extubation while patient is awake, Verification of full reversal of neuromuscular block and Extubation and recovery carried out in lateral, semiupright, or other nonsupine position  PONV status at discharge: No PONV  Anesthetic complications: no      Cardiovascular status: hemodynamically stable  Respiratory status: unassisted, spontaneous ventilation and room air  Hydration status: euvolemic  Follow-up not needed.          Vitals Value Taken Time   BP 95/56 11/18/2019 12:27 PM   Temp 36.1 °C (96.9 °F) 11/18/2019 11:11 AM   Pulse 60 11/18/2019 12:31 PM   Resp 15 11/18/2019 12:31 PM   SpO2 96 % 11/18/2019 12:31 PM   Vitals shown include unvalidated device data.      Event Time     Out of Recovery 11:00:00          Pain/Garland Score: Pain Rating Prior to Med Admin: 7 (11/18/2019 11:12 AM)  Pain Rating Post Med Admin: 5 (11/18/2019 11:30 AM)  Garland Score: 9 (11/18/2019 11:30 AM)

## 2019-11-18 NOTE — PLAN OF CARE
Pt vital signs wnl.  Pt verbalizes pain is tolerable. Pt verbalizes no nausea.  Abdominal lapsites intact.  Teresa draining without problems.

## 2019-11-18 NOTE — OP NOTE
11/18/2019      Procedure:   1) laparoscopic radical prostatectomy with robotic assistance  2) laparoscopic bilateral pelvic lymph node dissection    Preop diagnosis: Prostate Cancer  Postop diagnosis: Prostate Cancer, Stage T1C    Surgeon: Sujit Clements MD (present for the entire procedure)  Assistant: PETER Jensen MD  EBL: 150 ccs    Wound Class: 2    Specimens removed: prostate, seminal vesicles, lymph nodes, bladder neck biopsy    Drain: Teresa      PROCEDURE NOTE:  Preoperatively the H&P were updated. Also confirmed that patient had had their hibiclens shower and preop hydration. After general endotracheal anesthesia, the patient was carefully positioned, padded, prepped and draped.  Positioning and padding was checked by the surgeon and the circulating nurse.  IV antibiotics were administered within 1 hour prior to making initial skin incision.  An OG tube was placed.  A Teresa catheter was placed.  A timeout was called. The patient's identity was confirmed with 2 identifiers.  The correct procedure, allergies, blood products were verified by the entire operative team.                                                                      A Veress needle was placed at the supraumbilical position and the abdomen insufflated to 15 mmHg.  A 12 mm trocar was placed at this site and a 0 degree camera was introduced. The abdominal cavity was carefully inspected. There was no evidence of trauma to the peritoneal contents, nor any evidence of injury to the retroperitoneum.  All accessory trocars were then placed under direct vision.                                                                             The peritoneum posterior to the bladder was incised, the right vas deferens identified and divided.  The right seminal vesicle was gently dissected away from surrounding tissue with care being taken not to cause stretch  or thermal injury to the lateral pedicle. A similar procedure was performed  on  the left side.   Both seminal vesicles were retracted anteriorly. Denonvilliers fascia was incised and this plane of dissection carried down to the level of the apex of the prostate.  A posterior/lateral release was performed bilaterally.                                                                                                                                            An anterior bladder drop was performed.  After dropping the bladder, a right sided pelvic lymph node dissection was completed and this was sent as permanent pathologic specimen #1.  The endopelvic fascia on the right  was gently dissected posteriorly, thus beginning the lateral release.  A left sided pelvic lymph node dissection was performed and this was sent  as permanent pathologic specimen #2.  Again, the endopelvic fascia was   gently dissected posteriorly, completing the lateral release.  A V stitch was used in a figure of eight fashion to control the dorsal venous complex. Excellent hemostasis was noted at this juncture. The plane between the bladder neck and prostate base was developed and the urethra was divided, a bladder neck sparing approach was utilized.  Excellent preservation of the internal sphincter was achieved circumferentially.     The bladder neck biopsy was sent as permanent section specimen #3.      After dividing the posterior bladder neck, the seminal vesicles and vas ampulla were revisualized and retracted anteriorly. The lateral pedicle on the  was controlled with biprightolar cautery.      Cold scissors were used to athermically dissect the neurovascular bundle away  from the capsule of the prostate down to the level of the urethra, thus preserving the right neurovascular bundle.      A similar procedure was performed on the left side.    The urethra at the apex was divided preserving maximal urethral length.The rectourethralis was divided. The specimen was placed in the right colic gutter. The pelvis was  irrigated and carefully  inspected.  There was no evidence of rectal trauma and there was excellent hemostasis.  A vesicourethral anastomosis was then performed with a running suture of 3-0 Monocryl.  After completing the anastomosis, a fresh Teresa catheter was advanced into the bladder and the balloon  inflated.  The bladder was irrigated with 120  ccs twice. Once prior to tying the anastomotic suture and once after, there was noted to be no extravasation at the anastomosis. The specimen was placed in an endocatch bag.     Throughout the procedure the first assistant assisted with positioning, trocar placement, docking. She also provided assistance with exposure, visualization, traction/countertraction, suction and irrigation.     A drain .was not placed, specimen was removed from the field and port sites were closed.  Needle and sponge counts were correctThe patient was transferred to the Recovery Room in stable condition.

## 2019-11-18 NOTE — PROGRESS NOTES
Dr Krueger (anesthesia) has rounded and pt is cleared to go to the floor.  Sbp 98.  Pt remains asymptomatic.

## 2019-11-18 NOTE — PROGRESS NOTES
Bolus has been completed sbp 90s.  Pt is asymptomatic.  Dr Goldstein (anesthesia) at the bedside.  MD will discuss with Dr Krueger (anesthesia) if further intervention needed.

## 2019-11-18 NOTE — ANESTHESIA PROCEDURE NOTES
Erector Spinae Plane Single Injection Block    Patient location during procedure: pre-op   Block not for primary anesthetic.  Reason for block: at surgeon's request and post-op pain management   Post-op Pain Location: abdominal wall pain  Start time: 11/18/2019 6:18 AM  Timeout: 11/18/2019 6:15 AM   End time: 11/18/2019 6:25 AM    Staffing  Authorizing Provider: Irene Min MD  Performing Provider: Minda Hernandez MD    Preanesthetic Checklist  Completed: patient identified, site marked, surgical consent, pre-op evaluation, timeout performed, IV checked, risks and benefits discussed and monitors and equipment checked  Peripheral Block  Patient position: sitting  Prep: ChloraPrep  Patient monitoring: heart rate, cardiac monitor, continuous pulse ox, continuous capnometry and frequent blood pressure checks  Block type: erector spinae plane (Erector Spinae Plane Block)  Laterality: bilateral  Injection technique: single shot  Location: T8-9  Needle  Needle type: Tuohy   Needle gauge: 17 G  Needle length: 3.5 in  Needle localization: anatomical landmarks  Needle insertion depth: 5 cm     Assessment  Injection assessment: negative aspiration, negative parasthesia and local visualized surrounding nerve  Paresthesia pain: none  Heart rate change: no  Slow fractionated injection: yes  Additional Notes  Patient tolerated well.  See [facility] RN record for vitals.    30cc ropi with epi injected bilaterally

## 2019-11-18 NOTE — TRANSFER OF CARE
"Anesthesia Transfer of Care Note    Patient: Bay Yadav    Procedure(s) Performed: Procedure(s) (LRB):  XI ROBOTIC PROSTATECTOMY (N/A)  XI ROBOTIC LYMPHADENECTOMY, PELVIC (N/A)    Patient location: PACU    Anesthesia Type: general    Transport from OR: Transported from OR on 6-10 L/min O2 by face mask with adequate spontaneous ventilation    Post pain: adequate analgesia    Post assessment: no apparent anesthetic complications and tolerated procedure well    Post vital signs: stable    Level of consciousness: awake, alert and oriented    Nausea/Vomiting: no nausea/vomiting    Complications: none          Last vitals:   Visit Vitals  /64 (BP Location: Left arm, Patient Position: Lying)   Pulse 88   Temp 36.7 °C (98.1 °F) (Oral)   Resp 16   Ht 5' 7" (1.702 m)   Wt 81.2 kg (179 lb)   SpO2 98%   BMI 28.04 kg/m²     "

## 2019-11-19 VITALS
BODY MASS INDEX: 28.72 KG/M2 | SYSTOLIC BLOOD PRESSURE: 123 MMHG | DIASTOLIC BLOOD PRESSURE: 66 MMHG | HEIGHT: 67 IN | HEART RATE: 75 BPM | TEMPERATURE: 97 F | OXYGEN SATURATION: 96 % | WEIGHT: 183 LBS | RESPIRATION RATE: 19 BRPM

## 2019-11-19 LAB
BASOPHILS # BLD AUTO: 0.01 K/UL (ref 0–0.2)
BASOPHILS # BLD AUTO: 0.02 K/UL (ref 0–0.2)
BASOPHILS NFR BLD: 0.2 % (ref 0–1.9)
BASOPHILS NFR BLD: 0.3 % (ref 0–1.9)
DIFFERENTIAL METHOD: ABNORMAL
DIFFERENTIAL METHOD: ABNORMAL
EOSINOPHIL # BLD AUTO: 0 K/UL (ref 0–0.5)
EOSINOPHIL # BLD AUTO: 0.1 K/UL (ref 0–0.5)
EOSINOPHIL NFR BLD: 0.2 % (ref 0–8)
EOSINOPHIL NFR BLD: 1.1 % (ref 0–8)
ERYTHROCYTE [DISTWIDTH] IN BLOOD BY AUTOMATED COUNT: 14.5 % (ref 11.5–14.5)
ERYTHROCYTE [DISTWIDTH] IN BLOOD BY AUTOMATED COUNT: 14.5 % (ref 11.5–14.5)
HCT VFR BLD AUTO: 27.2 % (ref 40–54)
HCT VFR BLD AUTO: 28.6 % (ref 40–54)
HGB BLD-MCNC: 8.6 G/DL (ref 14–18)
HGB BLD-MCNC: 9.1 G/DL (ref 14–18)
IMM GRANULOCYTES # BLD AUTO: 0.04 K/UL (ref 0–0.04)
IMM GRANULOCYTES # BLD AUTO: 0.04 K/UL (ref 0–0.04)
IMM GRANULOCYTES NFR BLD AUTO: 0.5 % (ref 0–0.5)
IMM GRANULOCYTES NFR BLD AUTO: 0.6 % (ref 0–0.5)
LYMPHOCYTES # BLD AUTO: 1.8 K/UL (ref 1–4.8)
LYMPHOCYTES # BLD AUTO: 2.4 K/UL (ref 1–4.8)
LYMPHOCYTES NFR BLD: 27.6 % (ref 18–48)
LYMPHOCYTES NFR BLD: 32 % (ref 18–48)
MCH RBC QN AUTO: 29.5 PG (ref 27–31)
MCH RBC QN AUTO: 29.9 PG (ref 27–31)
MCHC RBC AUTO-ENTMCNC: 31.6 G/DL (ref 32–36)
MCHC RBC AUTO-ENTMCNC: 31.8 G/DL (ref 32–36)
MCV RBC AUTO: 93 FL (ref 82–98)
MCV RBC AUTO: 94 FL (ref 82–98)
MONOCYTES # BLD AUTO: 0.7 K/UL (ref 0.3–1)
MONOCYTES # BLD AUTO: 0.8 K/UL (ref 0.3–1)
MONOCYTES NFR BLD: 10.3 % (ref 4–15)
MONOCYTES NFR BLD: 11.2 % (ref 4–15)
NEUTROPHILS # BLD AUTO: 4 K/UL (ref 1.8–7.7)
NEUTROPHILS # BLD AUTO: 4.1 K/UL (ref 1.8–7.7)
NEUTROPHILS NFR BLD: 55.8 % (ref 38–73)
NEUTROPHILS NFR BLD: 60.2 % (ref 38–73)
NRBC BLD-RTO: 0 /100 WBC
NRBC BLD-RTO: 0 /100 WBC
PLATELET # BLD AUTO: 155 K/UL (ref 150–350)
PLATELET # BLD AUTO: 163 K/UL (ref 150–350)
PMV BLD AUTO: 10.1 FL (ref 9.2–12.9)
PMV BLD AUTO: 10.3 FL (ref 9.2–12.9)
RBC # BLD AUTO: 2.92 M/UL (ref 4.6–6.2)
RBC # BLD AUTO: 3.04 M/UL (ref 4.6–6.2)
WBC # BLD AUTO: 6.62 K/UL (ref 3.9–12.7)
WBC # BLD AUTO: 7.41 K/UL (ref 3.9–12.7)

## 2019-11-19 PROCEDURE — 63600175 PHARM REV CODE 636 W HCPCS: Performed by: STUDENT IN AN ORGANIZED HEALTH CARE EDUCATION/TRAINING PROGRAM

## 2019-11-19 PROCEDURE — 25000003 PHARM REV CODE 250: Performed by: STUDENT IN AN ORGANIZED HEALTH CARE EDUCATION/TRAINING PROGRAM

## 2019-11-19 PROCEDURE — 36415 COLL VENOUS BLD VENIPUNCTURE: CPT

## 2019-11-19 PROCEDURE — 85025 COMPLETE CBC W/AUTO DIFF WBC: CPT

## 2019-11-19 RX ORDER — NITROFURANTOIN MACROCRYSTALS 50 MG/1
50 CAPSULE ORAL DAILY
Qty: 7 CAPSULE | Refills: 0 | Status: SHIPPED | OUTPATIENT
Start: 2019-11-19 | End: 2019-11-26

## 2019-11-19 RX ORDER — POLYETHYLENE GLYCOL 3350 17 G/17G
17 POWDER, FOR SOLUTION ORAL DAILY
Qty: 237 G | Refills: 0 | Status: SHIPPED | OUTPATIENT
Start: 2019-11-19 | End: 2020-11-25

## 2019-11-19 RX ORDER — OXYCODONE AND ACETAMINOPHEN 5; 325 MG/1; MG/1
1 TABLET ORAL EVERY 6 HOURS PRN
Qty: 11 TABLET | Refills: 0 | Status: SHIPPED | OUTPATIENT
Start: 2019-11-19 | End: 2020-08-10

## 2019-11-19 RX ADMIN — ACETAMINOPHEN 1000 MG: 500 TABLET ORAL at 06:11

## 2019-11-19 RX ADMIN — KETOROLAC TROMETHAMINE 15 MG: 30 INJECTION, SOLUTION INTRAMUSCULAR; INTRAVENOUS at 02:11

## 2019-11-19 RX ADMIN — ACETAMINOPHEN 1000 MG: 500 TABLET ORAL at 12:11

## 2019-11-19 RX ADMIN — HEPARIN SODIUM 5000 UNITS: 5000 INJECTION, SOLUTION INTRAVENOUS; SUBCUTANEOUS at 06:11

## 2019-11-19 RX ADMIN — OXYCODONE HYDROCHLORIDE 5 MG: 5 TABLET ORAL at 11:11

## 2019-11-19 RX ADMIN — ACETAMINOPHEN 1000 MG: 500 TABLET ORAL at 11:11

## 2019-11-19 RX ADMIN — SODIUM CHLORIDE: 0.9 INJECTION, SOLUTION INTRAVENOUS at 01:11

## 2019-11-19 RX ADMIN — POLYETHYLENE GLYCOL 3350 17 G: 17 POWDER, FOR SOLUTION ORAL at 08:11

## 2019-11-19 RX ADMIN — DOXYCYCLINE HYCLATE 100 MG: 100 TABLET, COATED ORAL at 08:11

## 2019-11-19 RX ADMIN — PANTOPRAZOLE SODIUM 40 MG: 40 TABLET, DELAYED RELEASE ORAL at 08:11

## 2019-11-19 RX ADMIN — KETOROLAC TROMETHAMINE 15 MG: 30 INJECTION, SOLUTION INTRAMUSCULAR; INTRAVENOUS at 06:11

## 2019-11-19 NOTE — DISCHARGE INSTRUCTIONS
Finish your doxycycline and then start nitrofurantoin once daily afterward.    Do not shower until 48 hours after surgery. Your incisions were closed with absorbable suture. In order to maintain the strength of the absorbable sutures, do not scrub incisions in the shower, but rather, let soapy water run over incisions. Do not submerge incisions in water (no baths, hot tubs, or swimming pools). Your incisions were covered with Dermabond (skin glue), which will fall off on its own. Do not lift anything heavier than 10 lbs (e.g., a gallon of milk) for 6 weeks post-op in order to prevent incisional hernias.    What to expect with your Robotic Assisted Laparoscopic Prostatectomy.  [facility]  Updated 06/10/2011   Surgery  o Your surgery will last between 1.5 - 3 hours.   After surgery  o You may or may not have a drain that is shaped like a grenade and put to suction  - This drain usually comes out on Post Op Day (POD) 1. It will remain if the output is high and the nurses will teach you how to record the output and you will come back a few days after you leave to have the drain removed  o You will have a catheter after your surgery.  - This catheter protects your tissues to allow for healing between the bladder neck and the urethra now that the prostate has been removed.  - NO ONE IS TO REMOVE THIS CATHETER OR CHANGE THE CATHETER OTHER THAN SOMEONE FROM [facility].  - If you have to go to the ER because your catheter is not draining, come to the [facility] if possible and they will call us if they are not able to irrigate your catheter.  - If you live out of town and have to go to a local ER, then DO NOT let that doctor remove your catheter. If they are unable to irrigate the catheter or are having troubles with it, have them page the [facility] resident on call immediately at [phone] to help answer any questions.  - The catheter should come out in 7-10 days.   - You will have a  midline incision after surgery where the prostate was removed. This will be sewn with absorbable suture so you do not need to worry about having the sutures removed.  - You will have smaller incisions where the instruments were inserted that are also sewn closed with absorbable sutures.  o The night of surgery we expect and hope that you will:  - Walk - walking helps get the bowels moving. Also after your surgery, you are at a risk for a deep venous thrombosis (which is a clot in the legs that can form by remaining inactive or still for extended periods of time) and this can travel to your lungs and make you feel short of breath. This is a very serious condition. Walking helps prevent a DVT from occurring.  - Eat - you do not have to eat a whole meal, but we want to make sure you can tolerate liquid and/or solid food without nausea and vomiting  - Use your incentive spirometer - this is the breathing apparatus that helps you expand your lungs. If and when you have pain you will not want to take deep breaths. But if you dont take deep breaths, you are at risk for pneumonia. The incentive spirometer will help prevent this from occurring by expanding your lungs.  o Symptoms you may experience immediately post-op:  - Bloating and/or shoulder pain - when we do this operation, we fill up your abdominal cavity with gas to better help us visualize the organs and allow our instruments to fit. After the surgery, not all the air can be removed and your body will eventually absorb this small amount of air. However this can make you feel bloated. In addition, when you sit up, the air can sit right under a muscle (the diaphragm) which has connecting nerves to the shoulders, which could explain why you have shoulder pain.  - Do not expect necessarily to have gas or to have a bowel movement - this goes along with the bloating, you may feel like you want to pass gas or have a bowel movement but you cant. This is normal and you  will feel like this for a couple days. There are no pills to help with this. Small walks throughout the day should help with this.  - Pain - your pain should be able to be controlled with medicines by mouth that we prescribe. It is important for you to tell us if you are on any pain medications at home before the surgery as you may need stronger pain meds while in the hospital.  - Bladder spasms - this feels like you have to urinate but cant. Sometimes it can be due to clots clogging up your catheter. The nurse will irrigate the catheter, if there are no clots we can prescribe you an anti-spasmodic. We can also send you home with a prescription for one.   If you go home on anti-spasmodics, do not take one the morning of your appointment to have your catheter removed or you may not be able to void.   You can go home when:  o Pain is controlled with medicines by mouth  o You are able to walk without difficulty or pain  o You are tolerating a regulating diet  o Your catheter is draining freely   When you go home:  o Catheter care  - Blood in your urine (hematuria) is normal. However if you are having clots or your catheter stops draining and you are experiencing abdominal pain, go to the ER.  - If the tip of your penis hurts with the catheter in place, you can put some Vaseline at the end of the catheter to help lubricate the catheter.  o Activity  - Continue to walk - small walks throughout the day are better than one long walk.   - Do not lift anything greater than 8 pounds for 6 weeks - we want your abdominal wall muscles to heal.  o Bowel Movements - Do not strain to have a bowel movement - the pain medicines will make you constipated. That is why we also ask you to take colace 2-3 x per day to help keep your bowels regular. If you are still having trouble, then you can also add Miralax once a day. Do not take any stool softeners if you are having diarrhea.  o Drain - If you have a drain (not your catheter, but  a separate drain) then record the output and bring it with you to your next appointment  o Smoking - If you smoke, we encourage you to STOP. Smoking interferes with the healing process and will prolong your healing with continued smoking.  o Driving - Do not drive while you are on pain meds or with your catheter in place.  o Bathing - If you do not have a drain, you can shower 48 hours after your surgery. If you do have a drain, sponge bathe only until the drain is out.  o Dressing - you can remove the dressings if there is no drainage or change them as needed if there is. The little sterile band-aid strips will fall off on their own in 10-14 days. If they have not fallen off then you can remove them yourself after 14 days.  o Kegels - do not start your Kegels until after your catheter is out.  o Restarting medicines -especially blood thinners (Coumadin, ASA,Plavix), Fish Oil. Discuss this with your physician prior to discharge   When to return to the ER  o Fever - If you have a fever >101.5, this could be due to a number of reasons such as infection of the urine or incision. If your catheter has been removed, you could possibly have a leak. It would be best to come to the ER so they can better evaluate you.  o Severe pain - pain is expected, but severe or new onset of pain is not normal.   o Inability to tolerate food or liquid with nausea and vomiting - it would be best to go to the ER for them to better evaluate you.      Consents to be obtained - Surgical and Blood.  Before you sign the consent, understand the following risks:  o Surgery consent  - Bleeding - the prostate and its surrounding vessels are very vascular and bleeding can occur requiring a blood transfusion.  - Erectile dysfunction (impotence) - you will not have erections after surgery immediately post-op.  Your potency depends on your age and if you were potent beforehand. Nerve-sparing also helps. However, it does not guarantee erectile  function. There are options to discuss after surgery if you still remain impotent.  - Incontinence -95% of people regain their continence by one year. How fast you become continent again cannot be determined beforehand. Kegels, however, do help and you should start your kegels after your catheter is out.   - Blockage of ureters (the drainage tube between the kidney and the bladder)- if this occurrs, you will need another procedure to unblock the ureters.  - Stricture of the urethra or Bladder Neck Contracture - any time the urethra is instrumented, there is a risk of having strictures and this would require another procedure to dilate the stricture. Bladder neck contracture can occur after any type of anastomotic procedure. You would present with difficulty voiding or emptying your bladder.  - Damage to rectal wall, bowel, liver, spleen or any of the surrounding organs   - Infection of urine or incision requiring further treatment  - Pulmonary Emboli (blood clots) - this is why we ask you to walk around after surgery and the night of surgery to help prevent clots  - Need for conversion to open procedure   - Air embolus - to introduce air into the abdomen a needle is inserted into the abdomen, if this enters a vein or artery it can cause an embolus that travels to the heart and cause death.  - Anastomotic leak - there is always a chance that the tissues have not healed when the catheter is removed and you may possibly have a leak. You may possibly present or complain of abdominal pain, weakness or fever. If you have these symptoms return to the ER. If you have a leak you will need a catheter to be replaced, likely under direct vision with a camera.   - Small bowel obstruction (SBO) - after surgery, people can have adhesions that form from inflammation caused by surgery and this can cause a SBO, which would present with nausea and vomiting and unable to pass stool.  - Ileus - your bowels can be in shock after surgery  and you will present with nausea and vomiting. Sometimes this requires a tube that is placed into your stomach until your bowels start working again, this is temporary.  - Acute or chronic pain  - Re-insertion of catheter if there is a urinary leak  - Scars  - Death, paralysis from the neck or waist down, loss of limb  o Blood consents  - Risk of HIV and Hepatitis or any other blood borne disease  - Risk of allergic reaction to blood treated symptomatically  -

## 2019-11-19 NOTE — ASSESSMENT & PLAN NOTE
68 y.o. male with a history of [id number] Nia 3+4=7 prostate adenocarcinoma who underwent RALP with BPLND on 11/18/19.    - F/u AM CBC  - Monitor BP  - Ambulate qid  - Strict I's/O's  - Drains: Teresa - maintain on discharge  - Regular diet  - MIVF  - UCx 11/13 positive for E. Coli  - Abx: Doxycycline  - Home meds  - Pain control  - PPx: SCD's/DENIS's/IS/SQH  - Dispo: Will recheck later, possible home today

## 2019-11-19 NOTE — NURSING
POC reviewed with pt and spouse, both verbalized understanding. pt ambulated in louise x1 and tolerated well. no c/o of nausea after eating. c/o incisional pain, prn pain meds given. drew draining dark ladonna urine. pt instructed to call for assistance if needed, call light in reach. will continue to monitor.

## 2019-11-19 NOTE — HPI
68 y.o. male with a history of [id number] Cruger 3+4=7 prostate adenocarcinoma who underwent RALP with BPLND on 11/18/19.

## 2019-11-19 NOTE — PLAN OF CARE
Patient discharging home, self care. Teresa teaching provided to patient and spouse with return demonstration noted. Rx to be delivered to bedside.

## 2019-11-19 NOTE — SUBJECTIVE & OBJECTIVE
Interval History: Ambulating and tolerating diet. Soft BP's yesterday and overnight. Asymptomatic including no dizziness. HR is WNL. Has gotten a total of 1 L crystalloid boluses. Afebrile.    Review of Systems  Objective:     Temp:  [96.9 °F (36.1 °C)-98.4 °F (36.9 °C)] 97.3 °F (36.3 °C)  Pulse:  [49-92] 73  Resp:  [13-25] 18  SpO2:  [93 %-100 %] 93 %  BP: ()/(46-73) 95/56     Body mass index is 28.66 kg/m².           Drains     Drain                 Urethral Catheter 11/18/19 0925 less than 1 day                Physical Exam   Nursing note and vitals reviewed.  Constitutional: He is oriented to person, place, and time. He appears well-developed and well-nourished. No distress.   HENT:   Head: Normocephalic and atraumatic.   Eyes: Pupils are equal, round, and reactive to light. Right eye exhibits no discharge. Left eye exhibits no discharge.   Pulmonary/Chest: Effort normal. No respiratory distress.   Abdominal: Soft. He exhibits no distension. There is tenderness (Appropriate).   Port incisions clean/dry/intact   Genitourinary:   Genitourinary Comments: Teresa in place draining clear yellow urine   Neurological: He is alert and oriented to person, place, and time.   Skin: Skin is warm and dry.     Psychiatric: He has a normal mood and affect. His behavior is normal. Judgment and thought content normal.       Significant Labs:    BMP:  Recent Labs   Lab 11/13/19  0906      K 4.1      CO2 28   BUN 13   CREATININE 1.4   CALCIUM 9.7       CBC:   Recent Labs   Lab 11/13/19  0906   WBC 9.96   HGB 13.5*   HCT 42.4          All pertinent labs results from the past 24 hours have been reviewed.    Significant Imaging:  All pertinent imaging results/findings from the past 24 hours have been reviewed.

## 2019-11-19 NOTE — PROGRESS NOTES
[facility]  Urology  Progress Note    Patient Name: Bay Yadav  MRN: [id number]  Admission Date: 11/18/2019  Hospital Length of Stay: 1 days  Code Status: Full Code   Attending Provider: Jerardo Clements MD   Primary Care Physician: Wayne Brennan MD    Subjective:     HPI:  68 y.o. male with a history of [id number] Nia 3+4=7 prostate adenocarcinoma who underwent RALP with BPLND on 11/18/19.    Interval History: Ambulating and tolerating diet. Soft BP's yesterday and overnight. Asymptomatic including no dizziness. HR is WNL. Has gotten a total of 1 L crystalloid boluses. Afebrile.    Review of Systems  Objective:     Temp:  [96.9 °F (36.1 °C)-98.4 °F (36.9 °C)] 97.3 °F (36.3 °C)  Pulse:  [49-92] 73  Resp:  [13-25] 18  SpO2:  [93 %-100 %] 93 %  BP: ()/(46-73) 95/56     Body mass index is 28.66 kg/m².           Drains     Drain                 Urethral Catheter 11/18/19 0925 less than 1 day                Physical Exam   Nursing note and vitals reviewed.  Constitutional: He is oriented to person, place, and time. He appears well-developed and well-nourished. No distress.   HENT:   Head: Normocephalic and atraumatic.   Eyes: Pupils are equal, round, and reactive to light. Right eye exhibits no discharge. Left eye exhibits no discharge.   Pulmonary/Chest: Effort normal. No respiratory distress.   Abdominal: Soft. He exhibits no distension. There is tenderness (Appropriate).   Port incisions clean/dry/intact   Genitourinary:   Genitourinary Comments: Teresa in place draining clear yellow urine   Neurological: He is alert and oriented to person, place, and time.   Skin: Skin is warm and dry.     Psychiatric: He has a normal mood and affect. His behavior is normal. Judgment and thought content normal.       Significant Labs:    BMP:  Recent Labs   Lab 11/13/19  0906      K 4.1      CO2 28   BUN 13   CREATININE 1.4   CALCIUM 9.7       CBC:   Recent Labs   Lab 11/13/19  0906    WBC 9.96   HGB 13.5*   HCT 42.4          All pertinent labs results from the past 24 hours have been reviewed.    Significant Imaging:  All pertinent imaging results/findings from the past 24 hours have been reviewed.      Assessment/Plan:     * Prostate cancer  68 y.o. male with a history of [id number] Nia 3+4=7 prostate adenocarcinoma who underwent RALP with BPLND on 11/18/19.    - F/u AM CBC  - Monitor BP  - Ambulate qid  - Strict I's/O's  - Drains: Teresa - maintain on discharge  - Regular diet  - MIVF  - UCx 11/13 positive for E. Coli  - Abx: Doxycycline  - Home meds  - Pain control  - PPx: SCD's/DENIS's/IS/SQH  - Dispo: Will recheck later, possible home today        VTE Risk Mitigation (From admission, onward)         Ordered     heparin (porcine) injection 5,000 Units  Every 8 hours      11/18/19 0957     Place DENIS hose  Until discontinued      11/18/19 0957     Place sequential compression device  Until discontinued      11/18/19 0957     IP VTE HIGH RISK PATIENT  Once      11/18/19 0957                Dorian Tillman MD  Urology  [facility]

## 2019-11-20 NOTE — DISCHARGE SUMMARY
[facility]  Urology  Discharge Summary      Patient Name: Bay Yadav  MRN: [id number]  Admission Date: 11/18/2019  Hospital Length of Stay: 1 days  Discharge Date and Time: 11/19/2019  4:21 PM  Attending Physician: Jerardo Clmeents MD  Discharging Provider: Dorian Tillman MD  Primary Care Physician: Wayne Brennan MD    HPI:   68 y.o. male with a history of [id number] [place] 3+4=7 prostate adenocarcinoma who underwent RALP with BPLND on 11/18/19.    Procedure(s) (LRB):  XI ROBOTIC PROSTATECTOMY (N/A)  XI ROBOTIC LYMPHADENECTOMY, PELVIC (N/A)     Indwelling Lines/Drains at time of discharge:   Lines/Drains/Airways     Drain                 Urethral Catheter 11/18/19 0962 1 day                Hospital Course (synopsis of major diagnoses, care, treatment, and services provided during the course of the hospital stay): Patient underwent RALP with BPLND on 11/18/19. He tolerated the surgery well and was transferred to the floor from PACU. Overnight, his pressures were soft but he was asymptomatic including no dizziness. His HR was WNL. CBC the AM of 11/19 showed that his H/H had dropped from his pre-op levels. However, repeat CBC was stable. He ambulated, tolerated a regular diet, and his pain was controlled. He was deemed safe for discharge home on 11/19.    Consults: See chart review    Significant Diagnostic Studies: See chart review    Pending Diagnostic Studies:     Procedure Component Value Units Date/Time    Specimen to Pathology, Surgery Urology [[id number]] Collected:  11/18/19 0923    Order Status:  Sent Lab Status:  In process Updated:  11/18/19 1309          Final Active Diagnoses:    Diagnosis Date Noted POA    PRINCIPAL PROBLEM:  Prostate cancer [C61] 09/17/2019 Yes    Atherosclerosis of abdominal aorta [I70.0] 03/26/2019 Yes    Osteoarthritis of glenohumeral joint, left [M19.012] 03/20/2018 Yes    DDD (degenerative disc disease), cervical [M50.30] 09/22/2017 Yes    DDD  (degenerative disc disease), lumbar [M51.36] 09/22/2017 Yes    Chronic pain due to trauma [G89.21] 09/22/2017 Yes    Borderline diabetes [R73.03] 09/22/2017 Yes    Depression [F32.9] 09/22/2017 Yes    Essential hypertension, benign [I10] 09/22/2017 Yes    Mixed hyperlipidemia [E78.2] 09/22/2017 Yes    GERD (gastroesophageal reflux disease) [K21.9] 09/22/2017 Yes      Problems Resolved During this Admission:         Discharged Condition: good    Disposition: Home or Self Care    Follow Up:  Follow-up Information     Samia Hodgson NP On 11/26/2019.    Specialties:  Family Medicine, Urology  Why:  Post-op follow-up  Contact information:  [facility]  [facility] [address]  [phone]                 Patient Instructions:      Diet Adult Regular     No driving until:   Order Comments: Off narcotics     Notify your health care provider if you experience any of the following:  temperature >100.4     Notify your health care provider if you experience any of the following:  persistent nausea and vomiting or diarrhea     Notify your health care provider if you experience any of the following:  severe uncontrolled pain     Notify your health care provider if you experience any of the following:  redness, tenderness, or signs of infection (pain, swelling, redness, odor or green/yellow discharge around incision site)     Notify your health care provider if you experience any of the following:  difficulty breathing or increased cough     Notify your health care provider if you experience any of the following:  severe persistent headache     Notify your health care provider if you experience any of the following:  worsening rash     Notify your health care provider if you experience any of the following:  persistent dizziness, light-headedness, or visual disturbances     Notify your health care provider if you experience any of the following:  increased confusion or weakness     Other restrictions (specify):    Order Comments: Do not shower until 48 hours after surgery. Your incisions were closed with absorbable suture. In order to maintain the strength of the absorbable sutures, do not scrub incisions in the shower, but rather, let soapy water run over incisions. Do not submerge incisions in water (no baths, hot tubs, or swimming pools). Your incisions were covered with Dermabond (skin glue), which will fall off on its own. Do not lift anything heavier than 10 lbs (e.g., a gallon of milk) for 6 weeks post-op in order to prevent incisional hernias.     Activity as tolerated     Medications:  Reconciled Home Medications:      Medication List      START taking these medications    nitrofurantoin 50 MG capsule  Commonly known as:  MACRODANTIN  Take 1 capsule (50 mg total) by mouth once daily for 7 days     oxyCODONE-acetaminophen 5-325 mg per tablet  Commonly known as:  PERCOCET  Take 1 tablet by mouth every 6 (six) hours as needed for Pain.     polyethylene glycol 17 gram/dose powder  Commonly known as:  GLYCOLAX  Take 17 g by mouth once daily.        CONTINUE taking these medications    butalbital-acetaminophen-caffeine -40 mg -40 mg per tablet  Commonly known as:  FIORICET, ESGIC  TAKE 1 TABLET BY MOUTH EVERY 6 HOURS AS NEEDED FOR HEADACHES     doxycycline 100 MG tablet  Commonly known as:  VIBRA-TABS  Take 1 tablet (100 mg total) by mouth 2 (two) times daily. for 7 days     escitalopram oxalate 10 MG tablet  Commonly known as:  LEXAPRO  TAKE 1 TABLET(10 MG) BY MOUTH EVERY DAY     hydrocodone-homatropine 5-1.5 mg/5 ml 5-1.5 mg/5 mL Syrp  Commonly known as:  HYCODAN  Take 5 mLs by mouth every 6 (six) hours as needed (cough).     lisinopril 10 MG tablet  TAKE 1 TABLET(10 MG) BY MOUTH EVERY DAY     meclizine 25 mg tablet  Commonly known as:  ANTIVERT  TAKE 1 TABLET(25 MG) BY MOUTH THREE TIMES DAILY AS NEEDED     multivitamin with minerals tablet  Take 1 tablet by mouth once daily.     omeprazole 40 MG  capsule  Commonly known as:  PRILOSEC  TAKE 1 CAPSULE(40 MG) BY MOUTH EVERY DAY     PERCOGESIC ORAL  Take by mouth.     rosuvastatin 20 MG tablet  Commonly known as:  CRESTOR  TAKE 1 TABLET(20 MG) BY MOUTH EVERY DAY     THERALITH XR ORAL  Take by mouth.     traMADol 50 mg tablet  Commonly known as:  ULTRAM  TAKE 1 TABLET BY MOUTH EVERY 6 HOURS AS NEEDED            Time spent on the discharge of patient: 20 minutes    Dorian Tillman MD  Urology  [facility]

## 2019-11-26 ENCOUNTER — OFFICE VISIT (OUTPATIENT)
Dept: UROLOGY | Facility: CLINIC | Age: 69
End: 2019-11-26
Payer: MEDICARE

## 2019-11-26 VITALS
DIASTOLIC BLOOD PRESSURE: 75 MMHG | BODY MASS INDEX: 29.41 KG/M2 | HEIGHT: 67 IN | HEART RATE: 70 BPM | SYSTOLIC BLOOD PRESSURE: 133 MMHG | WEIGHT: 187.38 LBS

## 2019-11-26 DIAGNOSIS — N48.6 PEYRONIE DISEASE: ICD-10-CM

## 2019-11-26 DIAGNOSIS — N39.0 RECURRENT UTI: ICD-10-CM

## 2019-11-26 DIAGNOSIS — Z90.79 HISTORY OF ROBOT-ASSISTED LAPAROSCOPIC RADICAL PROSTATECTOMY: ICD-10-CM

## 2019-11-26 DIAGNOSIS — C61 PROSTATE CANCER: Primary | ICD-10-CM

## 2019-11-26 DIAGNOSIS — Z46.6 ENCOUNTER FOR FOLEY CATHETER REMOVAL: ICD-10-CM

## 2019-11-26 DIAGNOSIS — N52.31 ERECTILE DYSFUNCTION FOLLOWING RADICAL PROSTATECTOMY: ICD-10-CM

## 2019-11-26 PROCEDURE — 99999 PR PBB SHADOW E&M-EST. PATIENT-LVL V: CPT | Mod: PBBFAC,,, | Performed by: NURSE PRACTITIONER

## 2019-11-26 PROCEDURE — 99024 PR POST-OP FOLLOW-UP VISIT: ICD-10-PCS | Mod: S$GLB,,, | Performed by: NURSE PRACTITIONER

## 2019-11-26 PROCEDURE — 99024 POSTOP FOLLOW-UP VISIT: CPT | Mod: S$GLB,,, | Performed by: NURSE PRACTITIONER

## 2019-11-26 PROCEDURE — 99999 PR PBB SHADOW E&M-EST. PATIENT-LVL V: ICD-10-PCS | Mod: PBBFAC,,, | Performed by: NURSE PRACTITIONER

## 2019-11-26 RX ORDER — CEFDINIR 300 MG/1
CAPSULE ORAL
Refills: 0 | COMMUNITY
Start: 2019-11-15 | End: 2020-08-10

## 2019-11-26 NOTE — PATIENT INSTRUCTIONS
What Is Prostate Cancer?    Cancer is when cells in the body change and grow out of control. Cancer cells can form lumps of tissue called tumors. Cancer that starts in the prostate is called prostate cancer. It can grow and spread beyond the prostate. Cancer that spreads is harder to treat.  Understanding the prostate  The prostate is a gland in men about the size and shape of a walnut. It surrounds the upper part of the urethra. This is the tube that carries urine from the bladder. The prostate makes some of the fluid thats part of semen. During orgasm, semen leaves the body through the urethra.  When prostate cancer forms  As a man ages, the cells of his prostate may change to form tumors or other growths. The types of growths include:  · Noncancerous growths. As a man ages, the prostate may grow larger. This is called benign prostatic hyperplasia (BPH). With BPH, extra prostate tissue often squeezes the urethra, causing symptoms such as trouble urinating. But BPH is not cancer and does not lead to cancer.  · Atypical cells. Sometimes prostate cells dont look like normal (typical) prostate cells. One type of abnormal growth is called prostatic intraepithelial neoplasia or PIN. Although PIN cells are not cancer cells, they may be a sign that cancer is likely to form.  · Cancer. When abnormal prostate cells grow out of control and start to invade other tissues, they are called cancer cells. These cells may or may not lead to symptoms. Some tumors can be felt during a physical exam, and some cant. Prostate cancer may grow into nearby organs or spread to nearby lymph nodes. Lymph nodes are small organs around the body that are part of the immune system. In some cases, the cancer spreads to bones or organs in distant parts of the body. This is called metastasis.  Diagnosing prostate cancer  Prostate cancer may not cause symptoms at first. Urinary problems are often not a sign of cancer, but of another condition,  such as BPH. To find out if you have prostate cancer, your healthcare provider must examine you and order tests. The tests help confirm a diagnosis of cancer. They also help give more information about a cancerous tumor. Tests might include:  · Prostate specific antigen (PSA) testing. PSA is a chemical made by prostate tissue. The amount of PSA in the blood (PSA level) is tested to check a mans risk for prostate cancer. In general, a high or rising PSA level may mean an increased cancer risk. A PSA test by itself cannot show if a man has prostate cancer. PSA testing is also used to check the success of cancer treatments.  · Core needle biopsy. This test is done to determine if a man has prostate cancer. A hollow needle is used to take small pieces of tissue from the prostate. This helps give more information about the cells. Before the test, pain medicine may be given to prevent pain. During the test, a small probe is inserted into the rectum. The probe sends an image of the prostate to a video monitor. With this image as a guide, the healthcare provider uses a thin, hollow needle to remove tiny tissue samples from the prostate. These are sent to a lab where they are looked at for cancer cells.  Date Last Reviewed: 5/1/2017  © 4469-8679 [employer]. [address] [place] [address], [place] [address]. All rights reserved. This information is not intended as a substitute for professional medical care. Always follow your healthcare professional's instructions.        Prostate Cancer: Radical Prostatectomy     The prostate, seminal vesicles, and a portion of the urethra are removed.   Radical prostatectomy is surgery to remove the entire prostate. It may be done if tests show that the cancer is confined to the prostate. Your surgeon will give you detailed instructions on getting ready for surgery. After surgery, youll be told how to care for yourself at home as you recover. Be sure to ask any questions you have  about the procedure and recovery.  Risks and possible complications  All surgeries have risks. The risks of this surgery include:  · Blood clots  · Excess bleeding  · Hole (perforation) in the bowel  · Infection  · Loss of bladder control (incontinence)  · Lung infection (pneumonia)  · Trouble getting or keeping an erection (erectile dysfunction)  · Trouble urinating  Getting ready for your surgery     The urethra is reattached to the bladder. A catheter is inserted to drain urine while you heal. A balloon holds the catheter in place.   Follow all instructions from your healthcare team. In addition:  · Tell your healthcare provider about all medicines you take. This includes herbs and other supplements. It also includes any blood thinners, such as warfarin, clopidogrel, or daily aspirin. You may need to stop taking some or all of them before the surgery.  · You may be told to use a laxative, enemas, or both before the surgery. This is to empty the colon and rectum of stool. Follow the instructions you are given.  · Dont eat or drink after midnight the night before surgery.  How the surgery is done  The surgery may be done through several small incisions in the abdomen. This is called laparoscopy. In many cases, a method called robotic-assisted laparoscopy is used. The robotic system helps during the surgery. It gives a 3-D view of inside the body. It also assists the surgeons hand movements.   In some cases, the surgery may be performed through a larger incision in the abdomen. This is called the retropubic approach. Or surgery may be done through an incision behind the scrotum. This is called the perineal approach.  During the surgery:  · The surgeon may remove and check the lymph nodes near the prostate. This is to see if cancer has spread. If the cancer has spread, the surgeon may decide not to remove the prostate.  · The prostate, seminal vesicles, and a portion of urethra will then be  removed.  · Nerve-sparing methods may be used to try to preserve erectile function.  After surgery  You will have a catheter in place to drain urine from your bladder. Urine will flow through the catheter into a sterile bag. The urine may be bloody or cloudy at first. You may go home in 1 to 3 days.  Recovering at home  Youll be given medicines to control pain. The catheter will be left in place when you go home. Youll be given instructions on how to manage it.  Follow-up care  The catheter and stitches will be removed at a follow-up visit. This is often 1 to 2 weeks after surgery. Bladder control often takes a few weeks to several months to return. Improvement can continue for up to a year.  When to call your healthcare provider  Call your healthcare provider right away if you have any of the following:  · Chills  · Fever of 100.4°F (38°C) or higher, or as directed by your healthcare provider  · Fluid leaking from your incision  · Redness or pain in the incision that gets worse  · Swelling of your leg or ankle  · Trouble urinating after the catheter has been removed  · Urine not draining from the catheter   Date Last Reviewed: 5/1/2017  © 9412-8077 [employer]. [address] [place] [address]. All rights reserved. This information is not intended as a substitute for professional medical care. Always follow your healthcare professional's instructions.        Kegel Exercises  Kegel exercises dont need special clothing or equipment. Theyre easy to learn and simple to do. And if you do them right, no one can tell youre doing them, so they can be done almost anywhere. Your healthcare provider, nurse, or physical therapist can answer any questions you have and help you get started.    A weak pelvic floor   The pelvic floor muscles may weaken due to aging, pregnancy and vaginal childbirth, injury, surgery, chronic cough, or lack of exercise. If the pelvic floor is weak, your bladder and other  pelvic organs may sag out of place. The urethra may also open too easily and allow urine to leak out. Kegel exercises can help you strengthen your pelvic floor muscles. Then they can better support the pelvic organs and control urine flow.  How Kegel exercises are done  Try each of the Kegel exercises described below. When youre doing them, try not to move your leg, buttock, or stomach muscles.  · Contract as if you were stopping your urine stream. But do it when youre not urinating.  · Tighten your rectum as if trying not to pass gas. Contract your anus, but dont move your buttocks.  · You may place a finger or 2 in the vagina and squeeze your finger with your vagina to learn which muscles to tighten.  Try to hold each Kegel for a slow count to 5. You probably wont be able to hold them for that long at first. But keep practicing. It will get easier as your pelvic floor gets stronger. Eventually, special weights that you place in your vagina may be recommended to help make your Kegels even more effective. Visit your healthcare provider if you have difficulties doing Kegel exercises.  Helpful hints  Here are some tips to follow:  · Do your Kegels as often as you can. The more you do them, the faster youll feel the results.  · Pick an activity you do often as a reminder. For instance, do your Kegels every time you sit down.  · Tighten your pelvic floor before you sneeze, get up from a chair, cough, laugh, or lift. This protects your pelvic floor from injury and can help prevent urine leakage.   Date Last Reviewed: 8/5/2015  © 6519-8143 [employer]. [address] [place] [address], [place] [address]. All rights reserved. This information is not intended as a substitute for professional medical care. Always follow your healthcare professional's instructions.

## 2019-11-26 NOTE — PROGRESS NOTES
Subjective:       Patient ID: Bay Yadav is a 68 y.o. male.    Chief Complaint: Post-op Evaluation (drew removal) and Prostate Cancer (s/p RALP)    Bya Yadav is a 68 y.o. Male with a history of Prostate Cancer; s/p RALP 11/18/2019.  Pathology still processing  No family history of prostate cancer.   His brother recovering from brain surgery.     Stage- T1C  PSA 3.35  Biopsy (8/21/2019)- Hopkins 3+4 right apex 2/2 cores 50%; Hopkins 6 left base 5%, left middle 5%, left apex 5%.  Volume-  YURY score- 2 low risk  NCCN- favorable intermediate      Here today for one week f/u visit and drew removal.  Some discomfort from the drew.  Took his last antibiotic this morning.            Past Medical History:  No date: Acid reflux  No date: Cervical disc herniation  No date: Depression  No date: Heart murmur  No date: Hyperlipidemia  No date: Lumbar disc herniation  No date: Nephrolithiasis  No date: Upper GI bleed    Past Surgical History:  No date: EYE SURGERY  11/18/2019: ROBOT-ASSISTED LAPAROSCOPIC PELVIC LYMPHADENECTOMY USING   DA ASHLEY XI; N/A      Comment:  Procedure: XI ROBOTIC LYMPHADENECTOMY, PELVIC;  Surgeon:               Jerardo Clements MD;  Location: [facility] OR [address] [place] [address];                 Service: Urology;  Laterality: N/A;  11/18/2019: ROBOT-ASSISTED LAPAROSCOPIC PROSTATECTOMY USING DA ASHLEY   XI; N/A      Comment:  Procedure: XI ROBOTIC PROSTATECTOMY;  Surgeon: Jerardo Clements MD;  Location: [facility] OR [address] [place] [address];  Service:                Urology;  Laterality: N/A;  3hrs/ gen with regional    Review of patient's family history indicates:  Problem: Cancer      Relation: Mother          Age of Onset: (Not Specified)  Problem: Cancer      Relation: Father          Age of Onset: (Not Specified)  Problem: Cancer      Relation: Brother          Age of Onset: (Not Specified)      Social History    Socioeconomic History      Marital status:       Spouse name: Not on file      Number  of children: Not on file      Years of education: Not on file      Highest education level: Not on file    Occupational History      Not on file    Social Needs      Financial resource strain: Not on file      Food insecurity:        Worry: Not on file        Inability: Not on file      Transportation needs:        Medical: Not on file        Non-medical: Not on file    Tobacco Use      Smoking status: Former Smoker      Smokeless tobacco: Never Used    Substance and Sexual Activity      Alcohol use: No      Drug use: No      Sexual activity: Not on file    Lifestyle      Physical activity:        Days per week: Not on file        Minutes per session: Not on file      Stress: Not on file    Relationships      Social connections:        Talks on phone: Not on file        Gets together: Not on file        Attends Druze service: Not on file        Active member of club or organization: Not on file        Attends meetings of clubs or organizations: Not on file        Relationship status: Not on file    Other Topics      Concerns:        Not on file    Social History Narrative      Not on file      Allergies:  Asa (aspirin)    Medications:  Current Outpatient Medications:   acetaminophen/diphenhydramine (PERCOGESIC ORAL), Take by mouth., Disp: , Rfl:    butalbital-acetaminophen-caffeine -40 mg (FIORICET, ESGIC) -40 mg per tablet, TAKE 1 TABLET BY MOUTH EVERY 6 HOURS AS NEEDED FOR HEADACHES, Disp: 45 tablet, Rfl: 1  cefdinir (OMNICEF) 300 MG capsule, TK ONE C PO  BID, Disp: , Rfl: 0  escitalopram oxalate (LEXAPRO) 10 MG tablet, TAKE 1 TABLET(10 MG) BY MOUTH EVERY DAY, Disp: 90 tablet, Rfl: 3  hydrocodone-homatropine 5-1.5 mg/5 ml (HYCODAN) 5-1.5 mg/5 mL Syrp, Take 5 mLs by mouth every 6 (six) hours as needed (cough)., Disp: 120 mL, Rfl: 0  lisinopril 10 MG tablet, TAKE 1 TABLET(10 MG) BY MOUTH EVERY DAY, Disp: 90 tablet, Rfl: 3  meclizine (ANTIVERT) 25 mg tablet, TAKE 1 TABLET(25 MG) BY MOUTH THREE  TIMES DAILY AS NEEDED, Disp: 30 tablet, Rfl: 1  multivitamin with minerals tablet, Take 1 tablet by mouth once daily., Disp: , Rfl:   nitrofurantoin (MACRODANTIN) 50 MG capsule, Take 1 capsule (50 mg total) by mouth once daily for 7 days, Disp: 7 capsule, Rfl: 0  omeprazole (PRILOSEC) 40 MG capsule, TAKE 1 CAPSULE(40 MG) BY MOUTH EVERY DAY, Disp: 90 capsule, Rfl: 1  oxyCODONE-acetaminophen (PERCOCET) 5-325 mg per tablet, Take 1 tablet by mouth every 6 (six) hours as needed for Pain., Disp: 11 tablet, Rfl: 0  polyethylene glycol (GLYCOLAX) 17 gram/dose powder, Take 17 g by mouth once daily., Disp: 237 g, Rfl: 0  rosuvastatin (CRESTOR) 20 MG tablet, TAKE 1 TABLET(20 MG) BY MOUTH EVERY DAY, Disp: 90 tablet, Rfl: 3  traMADol (ULTRAM) 50 mg tablet, TAKE 1 TABLET BY MOUTH EVERY 6 HOURS AS NEEDED, Disp: 120 tablet, Rfl: 2  vitB6/mag cit,ox/potassium cit (THERALITH XR ORAL), Take by mouth., Disp: , Rfl:         Review of Systems   Constitutional: Negative for chills and fever.   Respiratory: Negative for shortness of breath.    Cardiovascular: Negative.  Negative for chest pain.   Gastrointestinal: Negative.  Negative for constipation, diarrhea, nausea and vomiting.   Genitourinary: Positive for penile pain.        Clear urine draining from drew.  History of Peyronnie's     Skin: Positive for color change.        (+) bruising to lower abdomen.  Incisions no longer draining         Objective:      Physical Exam   Nursing note and vitals reviewed.  Constitutional: He is oriented to person, place, and time. Vital signs are normal. He appears well-developed and well-nourished. He is active and cooperative.  Non-toxic appearance. He does not have a sickly appearance.   HENT:   Head: Normocephalic and atraumatic.   Right Ear: External ear normal.   Left Ear: External ear normal.   Nose: Nose normal.   Mouth/Throat: Mucous membranes are normal.   Eyes: Conjunctivae and lids are normal. No scleral icterus.   Neck: Trachea  normal, normal range of motion and full passive range of motion without pain. Neck supple. No JVD present. No tracheal deviation present.   Cardiovascular: Normal rate, S1 normal and S2 normal.    Pulmonary/Chest: Effort normal. No respiratory distress. He exhibits no tenderness.   Abdominal: Soft. Normal appearance. There is no hepatosplenomegaly. There is no tenderness. There is no CVA tenderness.   Abdominal incisions closed; no surrounding erythema;   (+) expected abdominal ecchymosis.     Genitourinary: Testes normal. Right testis shows no swelling and no tenderness. Left testis shows no swelling and no tenderness. Circumcised. Penile tenderness present. No hypospadias. No discharge found.       Musculoskeletal: Normal range of motion.   Neurological: He is alert and oriented to person, place, and time. He has normal strength.   Skin: Skin is warm, dry and intact.     Psychiatric: He has a normal mood and affect. His behavior is normal. Judgment and thought content normal.       Assessment:       1. Prostate cancer    2. History of robot-assisted laparoscopic radical prostatectomy    3. Erectile dysfunction following radical prostatectomy    4. Peyronie disease    5. Recurrent UTI    6. Encounter for Drew catheter removal        Plan:         I spent 30 minutes with the patient of which more than half was spent in direct consultation with the patient in regards to our treatment and plan.    Education and recommendations of today's plan of care including home remedies.  Consult Dr. Georges for penile rehab  [facility].  If he feels that he developing UTI home collect urine orders placed.  I easily removed his drew catheter; tolerated well.      We discussed home care with skin barrier cream to distal penis.    Patient was informed of the post operative pathology report is still processing. . Answered all his questions. We discussed his post op expectations. This month his focus is on Kegel exercises  as instructed by the nurse. Informed only 4 times at day at 10 reps. No more, no less. This is a muscle that has to exercised but also rest to get stronger.    Also emphasized the importance of his activity restrictions. No heavy lifting. Nothing greater than 15 lbs or greater than a gallon of milk. He has had a major surgery and his body needs to heal on inside as well as the outside. Daily walks as well as heart healthy low fat diets were encouraged.     We discussed his daily care of his surgical  Wound sites. Discussed avoiding certain movements and stretches that put strain on the sites. He voiced understanding.    We will reevaluate his bladder control at his next visit. We will also discuss penile rehabilitation to help with his recover and return of his erections. Both of these issues vary on response with each patient. But not to worry he is in healing process, and we will handle each issue as they come along. He voices understanding.    This next visit we will get his first PSA. Informed that we will set up a schedule of regular visits and PSA checks. These visits are at one month, then at 4 months, then every 6 months until 5 years after initial surgery.     After 5 years, the visits and PSA checks will be yearly up to 10 years post operatively. Informed these exams are important because we are monitoring for the possibility of reoccurance. The prostate and cancer was removed, but there is a chance that a microscopic cell was left behind. The surgeon can only remove what he can see. Monitoring the PSA as well as SAM's helps to discover this early if it happens.  Voices understanding. Call us with any questions. We will see him back in 1 month or PRN with any concerns.

## 2019-11-28 DIAGNOSIS — G44.219 EPISODIC TENSION-TYPE HEADACHE, NOT INTRACTABLE: ICD-10-CM

## 2019-12-02 LAB
FINAL PATHOLOGIC DIAGNOSIS: NORMAL
GROSS: NORMAL
MICROSCOPIC EXAM: NORMAL

## 2019-12-02 RX ORDER — BUTALBITAL, ACETAMINOPHEN AND CAFFEINE 50; 325; 40 MG/1; MG/1; MG/1
TABLET ORAL
Qty: 30 TABLET | Refills: 2 | Status: SHIPPED | OUTPATIENT
Start: 2019-12-02 | End: 2020-01-29

## 2019-12-04 ENCOUNTER — TELEPHONE (OUTPATIENT)
Dept: UROLOGY | Facility: CLINIC | Age: 69
End: 2019-12-04

## 2020-01-02 ENCOUNTER — OFFICE VISIT (OUTPATIENT)
Dept: UROLOGY | Facility: CLINIC | Age: 70
End: 2020-01-02
Payer: MEDICARE

## 2020-01-02 ENCOUNTER — TELEPHONE (OUTPATIENT)
Dept: UROLOGY | Facility: CLINIC | Age: 70
End: 2020-01-02

## 2020-01-02 VITALS
HEART RATE: 78 BPM | HEIGHT: 67 IN | BODY MASS INDEX: 28.02 KG/M2 | SYSTOLIC BLOOD PRESSURE: 119 MMHG | DIASTOLIC BLOOD PRESSURE: 73 MMHG | WEIGHT: 178.56 LBS

## 2020-01-02 VITALS
SYSTOLIC BLOOD PRESSURE: 119 MMHG | DIASTOLIC BLOOD PRESSURE: 73 MMHG | HEIGHT: 67 IN | HEART RATE: 78 BPM | BODY MASS INDEX: 28.09 KG/M2 | WEIGHT: 179 LBS

## 2020-01-02 DIAGNOSIS — N52.31 ERECTILE DYSFUNCTION FOLLOWING RADICAL PROSTATECTOMY: Primary | ICD-10-CM

## 2020-01-02 DIAGNOSIS — E78.2 MIXED HYPERLIPIDEMIA: ICD-10-CM

## 2020-01-02 DIAGNOSIS — N48.6 PEYRONIE'S DISEASE: ICD-10-CM

## 2020-01-02 DIAGNOSIS — I10 ESSENTIAL HYPERTENSION, BENIGN: ICD-10-CM

## 2020-01-02 DIAGNOSIS — C61 PROSTATE CANCER: Primary | ICD-10-CM

## 2020-01-02 DIAGNOSIS — Z90.79 HISTORY OF ROBOT-ASSISTED LAPAROSCOPIC RADICAL PROSTATECTOMY: ICD-10-CM

## 2020-01-02 DIAGNOSIS — C61 PROSTATE CANCER: ICD-10-CM

## 2020-01-02 PROCEDURE — 3078F DIAST BP <80 MM HG: CPT | Mod: S$GLB,,, | Performed by: UROLOGY

## 2020-01-02 PROCEDURE — 99999 PR PBB SHADOW E&M-EST. PATIENT-LVL IV: CPT | Mod: PBBFAC,,, | Performed by: NURSE PRACTITIONER

## 2020-01-02 PROCEDURE — 1101F PT FALLS ASSESS-DOCD LE1/YR: CPT | Mod: S$GLB,,, | Performed by: UROLOGY

## 2020-01-02 PROCEDURE — 99999 PR PBB SHADOW E&M-EST. PATIENT-LVL III: CPT | Mod: PBBFAC,,, | Performed by: UROLOGY

## 2020-01-02 PROCEDURE — 99024 PR POST-OP FOLLOW-UP VISIT: ICD-10-PCS | Mod: S$GLB,,, | Performed by: NURSE PRACTITIONER

## 2020-01-02 PROCEDURE — 3078F PR MOST RECENT DIASTOLIC BLOOD PRESSURE < 80 MM HG: ICD-10-PCS | Mod: S$GLB,,, | Performed by: UROLOGY

## 2020-01-02 PROCEDURE — 1126F PR PAIN SEVERITY QUANTIFIED, NO PAIN PRESENT: ICD-10-PCS | Mod: S$GLB,,, | Performed by: UROLOGY

## 2020-01-02 PROCEDURE — 1159F PR MEDICATION LIST DOCUMENTED IN MEDICAL RECORD: ICD-10-PCS | Mod: S$GLB,,, | Performed by: UROLOGY

## 2020-01-02 PROCEDURE — 99024 POSTOP FOLLOW-UP VISIT: CPT | Mod: S$GLB,,, | Performed by: NURSE PRACTITIONER

## 2020-01-02 PROCEDURE — 3074F SYST BP LT 130 MM HG: CPT | Mod: S$GLB,,, | Performed by: UROLOGY

## 2020-01-02 PROCEDURE — 1126F AMNT PAIN NOTED NONE PRSNT: CPT | Mod: S$GLB,,, | Performed by: UROLOGY

## 2020-01-02 PROCEDURE — 1101F PR PT FALLS ASSESS DOC 0-1 FALLS W/OUT INJ PAST YR: ICD-10-PCS | Mod: S$GLB,,, | Performed by: UROLOGY

## 2020-01-02 PROCEDURE — 99214 OFFICE O/P EST MOD 30 MIN: CPT | Mod: 24,S$GLB,, | Performed by: UROLOGY

## 2020-01-02 PROCEDURE — 99999 PR PBB SHADOW E&M-EST. PATIENT-LVL III: ICD-10-PCS | Mod: PBBFAC,,, | Performed by: UROLOGY

## 2020-01-02 PROCEDURE — 3074F PR MOST RECENT SYSTOLIC BLOOD PRESSURE < 130 MM HG: ICD-10-PCS | Mod: S$GLB,,, | Performed by: UROLOGY

## 2020-01-02 PROCEDURE — 99214 PR OFFICE/OUTPT VISIT, EST, LEVL IV, 30-39 MIN: ICD-10-PCS | Mod: 24,S$GLB,, | Performed by: UROLOGY

## 2020-01-02 PROCEDURE — 1159F MED LIST DOCD IN RCRD: CPT | Mod: S$GLB,,, | Performed by: UROLOGY

## 2020-01-02 PROCEDURE — 99999 PR PBB SHADOW E&M-EST. PATIENT-LVL IV: ICD-10-PCS | Mod: PBBFAC,,, | Performed by: NURSE PRACTITIONER

## 2020-01-02 RX ORDER — TIZANIDINE 4 MG/1
5 TABLET ORAL NIGHTLY PRN
COMMUNITY
Start: 2019-12-23 | End: 2020-08-10

## 2020-01-02 RX ORDER — BENZONATATE 100 MG/1
1 CAPSULE ORAL 3 TIMES DAILY PRN
COMMUNITY
Start: 2019-12-23 | End: 2020-08-10

## 2020-01-02 RX ORDER — TADALAFIL 20 MG/1
TABLET ORAL
Qty: 30 TABLET | Refills: 11 | Status: SHIPPED | OUTPATIENT
Start: 2020-01-02 | End: 2020-06-11

## 2020-01-02 NOTE — PROGRESS NOTES
Mr. Yadav is a 69 y.o. male presenting for a consultation at the request of Dr. Clements. Patient presents with ED.    PRESENTING ILLNESS:    Bay Yadav is a 69 y.o. male with prostate cancer s/p RALP on 11/18/19 by Dr. Clements.  Since then, he c/o ED.  Prior to surgery, he did not have ED.  However, he did have peyronie's disease.  He's s/p xiaflex with no improvement.  He has 90 degrees of curve to the L.  It is difficult to penetrate due to the curve.  It's been present for  6 months.    He does have OCTAVIA.  He is wearing 1 pads/day.  No hematuria.  No dysuria.  Good FOS.    No bone pain or weight loss.    REVIEW OF SYSTEMS:    Bay Yadav denies headache, blurred vision, fever, nausea, vomiting, chills, abdominal pain, chest pain, sore throat, bleeding per rectum, cough, SOB, recent loss of consciousness, recent mental status changes, seizures, dizziness, or upper or lower extremity weakness.    RADHA  1. 1  2. 0  3. 0  4. 0  5. 0      PATIENT HISTORY:    Past Medical History:   Diagnosis Date    Acid reflux     Cervical disc herniation     Depression     Heart murmur     Hyperlipidemia     Lumbar disc herniation     Nephrolithiasis     Upper GI bleed        Family History   Problem Relation Age of Onset    Cancer Mother     Cancer Father     Cancer Brother        Past Surgical History:   Procedure Laterality Date    EYE SURGERY      ROBOT-ASSISTED LAPAROSCOPIC PELVIC LYMPHADENECTOMY USING DA ASHLEY XI N/A 11/18/2019    Procedure: XI ROBOTIC LYMPHADENECTOMY, PELVIC;  Surgeon: Jerardo Clemenst MD;  Location: [facility] OR [address] [place] [address];  Service: Urology;  Laterality: N/A;    ROBOT-ASSISTED LAPAROSCOPIC PROSTATECTOMY USING DA ASHLEY XI N/A 11/18/2019    Procedure: XI ROBOTIC PROSTATECTOMY;  Surgeon: Jerardo Clements MD;  Location: [facility] OR [address] [place] [address];  Service: Urology;  Laterality: N/A;  3hrs/ gen with regional       Social History     Socioeconomic History    Marital status:      Spouse  name: Not on file    Number of children: Not on file    Years of education: Not on file    Highest education level: Not on file   Occupational History    Not on file   Social Needs    Financial resource strain: Not on file    Food insecurity:     Worry: Not on file     Inability: Not on file    Transportation needs:     Medical: Not on file     Non-medical: Not on file   Tobacco Use    Smoking status: Former Smoker    Smokeless tobacco: Never Used   Substance and Sexual Activity    Alcohol use: No    Drug use: No    Sexual activity: Not on file   Lifestyle    Physical activity:     Days per week: Not on file     Minutes per session: Not on file    Stress: Not on file   Relationships    Social connections:     Talks on phone: Not on file     Gets together: Not on file     Attends Spiritism service: Not on file     Active member of club or organization: Not on file     Attends meetings of clubs or organizations: Not on file     Relationship status: Not on file   Other Topics Concern    Not on file   Social History Narrative    Not on file       Review of patient's allergies indicates:   Allergen Reactions    Asa [aspirin]      Gastro bleed         Current Outpatient Medications:     acetaminophen/diphenhydramine (PERCOGESIC ORAL), Take by mouth., Disp: , Rfl:     benzonatate (TESSALON) 100 MG capsule, Take 1 capsule by mouth 3 (three) times daily as needed., Disp: , Rfl:     butalbital-acetaminophen-caffeine -40 mg (FIORICET, ESGIC) -40 mg per tablet, TAKE 1 TABLET BY MOUTH EVERY 6 HOURS AS NEEDED FOR HEADACHES, Disp: 30 tablet, Rfl: 2    cefdinir (OMNICEF) 300 MG capsule, TK ONE C PO  BID, Disp: , Rfl: 0    escitalopram oxalate (LEXAPRO) 10 MG tablet, TAKE 1 TABLET(10 MG) BY MOUTH EVERY DAY, Disp: 90 tablet, Rfl: 3    hydrocodone-homatropine 5-1.5 mg/5 ml (HYCODAN) 5-1.5 mg/5 mL Syrp, Take 5 mLs by mouth every 6 (six) hours as needed (cough)., Disp: 120 mL, Rfl: 0    lisinopril 10  MG tablet, TAKE 1 TABLET(10 MG) BY MOUTH EVERY DAY, Disp: 90 tablet, Rfl: 3    meclizine (ANTIVERT) 25 mg tablet, TAKE 1 TABLET(25 MG) BY MOUTH THREE TIMES DAILY AS NEEDED, Disp: 30 tablet, Rfl: 1    multivitamin with minerals tablet, Take 1 tablet by mouth once daily., Disp: , Rfl:     omeprazole (PRILOSEC) 40 MG capsule, TAKE 1 CAPSULE(40 MG) BY MOUTH EVERY DAY, Disp: 90 capsule, Rfl: 1    oxyCODONE-acetaminophen (PERCOCET) 5-325 mg per tablet, Take 1 tablet by mouth every 6 (six) hours as needed for Pain., Disp: 11 tablet, Rfl: 0    polyethylene glycol (GLYCOLAX) 17 gram/dose powder, Take 17 g by mouth once daily., Disp: 237 g, Rfl: 0    rosuvastatin (CRESTOR) 20 MG tablet, TAKE 1 TABLET(20 MG) BY MOUTH EVERY DAY, Disp: 90 tablet, Rfl: 3    traMADol (ULTRAM) 50 mg tablet, TAKE 1 TABLET BY MOUTH EVERY 6 HOURS AS NEEDED, Disp: 120 tablet, Rfl: 2    VIRTUSSIN AC  mg/5 mL syrup, Take 5 mLs by mouth nightly as needed., Disp: , Rfl:     vitB6/mag cit,ox/potassium cit (THERALITH XR ORAL), Take by mouth., Disp: , Rfl:       PHYSICAL EXAMINATION:    The patient generally appears in good health, is appropriately interactive, and is in no apparent distress.     Eyes: anicteric sclerae, moist conjunctivae; no lid-lag; PERRLA     HENT: Atraumatic; oropharynx clear with moist mucous membranes and no mucosal ulcerations;normal hard and soft palate. No evidence of lymphadenopathy.    Neck: Trachea midline.  No thyromegaly.    Musculoskeletal: No abnormal gait.    Skin: No lesions.    Mental: Cooperative with normal affect.  Is oriented to time, place, and person.    Neuro: Grossly intact.    Chest: Normal inspiratory effort.   No accessory muscles.  No audible wheezes.  Respirations symmetric on inspiration and expiration.    Heart: Regular rhythm.      Abdomen:  Soft, non-tender. No masses or organomegaly. Bladder is not palpable. No evidence of flank discomfort. No evidence of inguinal hernia.    Genitourinary:  The penis is circumcised with a plaque c/w peyronie's on the shaft.  he urethral meatus is normal. The testes, epididymides, and cord structures are normal in size and contour bilaterally. The scrotum is normal in size and contour.    Extremities: No clubbing, cyanosis, or edema        LABS:      Lab Results   Component Value Date    PSADIAG <0.10 12/31/2019        IMPRESSION:    Encounter Diagnoses   Name Primary?    Erectile dysfunction following radical prostatectomy Yes    Prostate cancer     Essential hypertension, benign     Mixed hyperlipidemia     Peyronie's disease    HTN, controlled  Hyperlipidemia, controlled    PLAN:    1. Discussed penile rehab today.  The risks, benefits, and the evidence was discussed with him today.  We discussed different options.  He would like to try Cialis.  Side effects discussed.  A new Rx was given.  2. RTC 3 months to re-evaluate.    Copy to:

## 2020-01-02 NOTE — PATIENT INSTRUCTIONS
PSA                  <0.10               12/31/2019                    What Is Prostate Cancer?    Cancer is when cells in the body change and grow out of control. Cancer cells can form lumps of tissue called tumors. Cancer that starts in the prostate is called prostate cancer. It can grow and spread beyond the prostate. Cancer that spreads is harder to treat.  Understanding the prostate  The prostate is a gland in men about the size and shape of a walnut. It surrounds the upper part of the urethra. This is the tube that carries urine from the bladder. The prostate makes some of the fluid thats part of semen. During orgasm, semen leaves the body through the urethra.  When prostate cancer forms  As a man ages, the cells of his prostate may change to form tumors or other growths. The types of growths include:  · Noncancerous growths. As a man ages, the prostate may grow larger. This is called benign prostatic hyperplasia (BPH). With BPH, extra prostate tissue often squeezes the urethra, causing symptoms such as trouble urinating. But BPH is not cancer and does not lead to cancer.  · Atypical cells. Sometimes prostate cells dont look like normal (typical) prostate cells. One type of abnormal growth is called prostatic intraepithelial neoplasia or PIN. Although PIN cells are not cancer cells, they may be a sign that cancer is likely to form.  · Cancer. When abnormal prostate cells grow out of control and start to invade other tissues, they are called cancer cells. These cells may or may not lead to symptoms. Some tumors can be felt during a physical exam, and some cant. Prostate cancer may grow into nearby organs or spread to nearby lymph nodes. Lymph nodes are small organs around the body that are part of the immune system. In some cases, the cancer spreads to bones or organs in distant parts of the body. This is called metastasis.  Diagnosing prostate cancer  Prostate cancer may not cause symptoms at  first. Urinary problems are often not a sign of cancer, but of another condition, such as BPH. To find out if you have prostate cancer, your healthcare provider must examine you and order tests. The tests help confirm a diagnosis of cancer. They also help give more information about a cancerous tumor. Tests might include:  · Prostate specific antigen (PSA) testing. PSA is a chemical made by prostate tissue. The amount of PSA in the blood (PSA level) is tested to check a mans risk for prostate cancer. In general, a high or rising PSA level may mean an increased cancer risk. A PSA test by itself cannot show if a man has prostate cancer. PSA testing is also used to check the success of cancer treatments.  · Core needle biopsy. This test is done to determine if a man has prostate cancer. A hollow needle is used to take small pieces of tissue from the prostate. This helps give more information about the cells. Before the test, pain medicine may be given to prevent pain. During the test, a small probe is inserted into the rectum. The probe sends an image of the prostate to a video monitor. With this image as a guide, the healthcare provider uses a thin, hollow needle to remove tiny tissue samples from the prostate. These are sent to a lab where they are looked at for cancer cells.  Date Last Reviewed: 5/1/2017 © 2000-2017 [employer]. [address] [place] [address], [place] [address]. All rights reserved. This information is not intended as a substitute for professional medical care. Always follow your healthcare professional's instructions.

## 2020-01-02 NOTE — PROGRESS NOTES
Subjective:       Patient ID: Bay Yadav is a 69 y.o. male.    Chief Complaint: Prostate Cancer (1 month RALP)    Bay Yadav is a 68 y.o. Male with a history of Prostate Cancer; s/p RALP 11/18/2019.  [place] 6 (3+3) with (-) extension, margins, & SV;   No family history of prostate cancer.   His brother recovering from brain surgery.   Initial Dx on 08/21/2019 with PSA of 3.35: [place] 7(3+7)    Teresa was removed 11/26/2019    Here today for one month f/u visit.  He reports he still having some stress incontinence; one pad a day but uses a paper towel as well.  He admits that he is not faithful with the kegel exercises.  He passed 2 kidney stones prior to this appt    He saw Dr. Georges today for penile rehab.                          PSA                  <0.10               12/31/2019                      Past Medical History:  No date: Acid reflux  No date: Cervical disc herniation  No date: Depression  No date: Heart murmur  No date: Hyperlipidemia  No date: Lumbar disc herniation  No date: Nephrolithiasis  No date: Upper GI bleed    Past Surgical History:  No date: EYE SURGERY  11/18/2019: ROBOT-ASSISTED LAPAROSCOPIC PELVIC LYMPHADENECTOMY USING   DA ASHLEY XI; N/A      Comment:  Procedure: XI ROBOTIC LYMPHADENECTOMY, PELVIC;  Surgeon:               Jerardo Clements MD;  Location: [facility] OR [address] [place] [address];                 Service: Urology;  Laterality: N/A;  11/18/2019: ROBOT-ASSISTED LAPAROSCOPIC PROSTATECTOMY USING DA ASHLEY   XI; N/A      Comment:  Procedure: XI ROBOTIC PROSTATECTOMY;  Surgeon: Jerardo Clements MD;  Location: [facility] OR [address] [place] [address];  Service:                Urology;  Laterality: N/A;  3hrs/ gen with regional    Review of patient's family history indicates:  Problem: Cancer      Relation: Mother          Age of Onset: (Not Specified)  Problem: Cancer      Relation: Father          Age of Onset: (Not Specified)  Problem: Cancer      Relation: Brother          Age of Onset: (Not  Specified)      Social History    Socioeconomic History      Marital status:       Spouse name: Not on file      Number of children: Not on file      Years of education: Not on file      Highest education level: Not on file    Occupational History      Not on file    Social Needs      Financial resource strain: Not on file      Food insecurity:        Worry: Not on file        Inability: Not on file      Transportation needs:        Medical: Not on file        Non-medical: Not on file    Tobacco Use      Smoking status: Former Smoker      Smokeless tobacco: Never Used    Substance and Sexual Activity      Alcohol use: No      Drug use: No      Sexual activity: Not on file    Lifestyle      Physical activity:        Days per week: Not on file        Minutes per session: Not on file      Stress: Not on file    Relationships      Social connections:        Talks on phone: Not on file        Gets together: Not on file        Attends Druze service: Not on file        Active member of club or organization: Not on file        Attends meetings of clubs or organizations: Not on file        Relationship status: Not on file    Other Topics      Concerns:        Not on file    Social History Narrative      Not on file      Allergies:  Asa (aspirin)    Medications:  Current Outpatient Medications:   acetaminophen/diphenhydramine (PERCOGESIC ORAL), Take by mouth., Disp: , Rfl:    butalbital-acetaminophen-caffeine -40 mg (FIORICET, ESGIC) -40 mg per tablet, TAKE 1 TABLET BY MOUTH EVERY 6 HOURS AS NEEDED FOR HEADACHES, Disp: 45 tablet, Rfl: 1  cefdinir (OMNICEF) 300 MG capsule, TK ONE C PO  BID, Disp: , Rfl: 0  escitalopram oxalate (LEXAPRO) 10 MG tablet, TAKE 1 TABLET(10 MG) BY MOUTH EVERY DAY, Disp: 90 tablet, Rfl: 3  hydrocodone-homatropine 5-1.5 mg/5 ml (HYCODAN) 5-1.5 mg/5 mL Syrp, Take 5 mLs by mouth every 6 (six) hours as needed (cough)., Disp: 120 mL, Rfl: 0  lisinopril 10 MG tablet, TAKE 1  TABLET(10 MG) BY MOUTH EVERY DAY, Disp: 90 tablet, Rfl: 3  meclizine (ANTIVERT) 25 mg tablet, TAKE 1 TABLET(25 MG) BY MOUTH THREE TIMES DAILY AS NEEDED, Disp: 30 tablet, Rfl: 1  multivitamin with minerals tablet, Take 1 tablet by mouth once daily., Disp: , Rfl:   nitrofurantoin (MACRODANTIN) 50 MG capsule, Take 1 capsule (50 mg total) by mouth once daily for 7 days, Disp: 7 capsule, Rfl: 0  omeprazole (PRILOSEC) 40 MG capsule, TAKE 1 CAPSULE(40 MG) BY MOUTH EVERY DAY, Disp: 90 capsule, Rfl: 1  oxyCODONE-acetaminophen (PERCOCET) 5-325 mg per tablet, Take 1 tablet by mouth every 6 (six) hours as needed for Pain., Disp: 11 tablet, Rfl: 0  polyethylene glycol (GLYCOLAX) 17 gram/dose powder, Take 17 g by mouth once daily., Disp: 237 g, Rfl: 0  rosuvastatin (CRESTOR) 20 MG tablet, TAKE 1 TABLET(20 MG) BY MOUTH EVERY DAY, Disp: 90 tablet, Rfl: 3  traMADol (ULTRAM) 50 mg tablet, TAKE 1 TABLET BY MOUTH EVERY 6 HOURS AS NEEDED, Disp: 120 tablet, Rfl: 2  vitB6/mag cit,ox/potassium cit (THERALITH XR ORAL), Take by mouth., Disp: , Rfl:         Review of Systems   Constitutional: Negative for activity change, appetite change, chills and fever.   HENT: Negative for facial swelling and trouble swallowing.    Eyes: Negative for visual disturbance.   Respiratory: Negative for chest tightness and shortness of breath.    Cardiovascular: Negative for chest pain and palpitations.   Gastrointestinal: Negative.  Negative for abdominal pain, constipation, diarrhea, nausea and vomiting.   Genitourinary: Positive for enuresis. Negative for difficulty urinating, dysuria, flank pain, hematuria, penile pain, penile swelling, scrotal swelling and testicular pain.        He having some pelvic discomfort; but that is chronic; it was present prior to surgery.     Musculoskeletal: Negative for back pain, gait problem, myalgias and neck stiffness.   Skin: Negative for rash.   Neurological: Negative for dizziness and speech difficulty.    Hematological: Does not bruise/bleed easily.   Psychiatric/Behavioral: Negative for behavioral problems.       Objective:      Physical Exam   Nursing note and vitals reviewed.  Constitutional: He is oriented to person, place, and time. Vital signs are normal. He appears well-developed and well-nourished.  Non-toxic appearance. He does not have a sickly appearance.   HENT:   Head: Normocephalic and atraumatic.   Right Ear: External ear normal.   Left Ear: External ear normal.   Nose: Nose normal.   Mouth/Throat: Mucous membranes are normal.   Eyes: Conjunctivae and lids are normal. No scleral icterus.   Neck: Trachea normal, normal range of motion and full passive range of motion without pain. Neck supple. No JVD present. No tracheal deviation present.   Cardiovascular: Normal rate, S1 normal and S2 normal.    Pulmonary/Chest: Effort normal. No respiratory distress. He exhibits no tenderness.   Abdominal: Soft. Normal appearance. There is no hepatosplenomegaly. There is no tenderness. There is no CVA tenderness.   Musculoskeletal: Normal range of motion.   Neurological: He is alert and oriented to person, place, and time. He has normal strength.   Skin: Skin is warm, dry and intact.     Psychiatric: He has a normal mood and affect. His behavior is normal. Judgment and thought content normal.       Assessment:       1. Prostate cancer    2. History of robot-assisted laparoscopic radical prostatectomy        Plan:         I spent 20 minutes with the patient of which more than half was spent in direct consultation with the patient in regards to our treatment and plan.    Education and recommendations of today's plan of care including home remedies.  Reviewed his pathology report  Patient was educated on exam and exam findings. Discussed PSA results. Educated on continuing Kegel Exercises as previously discussed but now only twice a day but increasing the reps from 10 to 20. Not to over work and over do the Kegel  Exercises. Doing Kegel's too much will decrease the strength of the pelvic floor muscles.    Patient was informed that everyone recovers and recuperates and different rates. He is not to get discouraged if he is not improving as fast as he likes. If he sees no improvement, we can refer him to Juliana Estrada, Physical Therapist. This is physical therapy for pelvic floor strengthen exercises.  Patients have improved greatly and most become completely continent after completing her therapy regimen.    His activity restrictions are lifted after 6 weeks of surgery. We will see him back in 4 months for an exam and another PSA. Then he will follow up every 6 months with PSA's for the first 5 years after surgery. After 5 years, then he will be examined yearly with PSA's and SAM's until 10 years after initial surgery.    RTC 4 months with PSA

## 2020-01-02 NOTE — TELEPHONE ENCOUNTER
----- Message from Samia Hodgson NP sent at 1/2/2020  4:18 PM CST -----  Contact: Bay   tel;   [phone]   After 5 pm today!  Kidding;    He can!          ----- Message -----  From: Cristiana Menendez LPN  Sent: 1/2/2020   3:36 PM CST  To: Samia Hodgson NP    Can he lift more than ten pounds now?  Cristiana   ----- Message -----  From: Sydnee Pichardo  Sent: 1/2/2020   3:19 PM CST  To: Destini LEWIS Staff    Caller says he needs to know how much weight he can ?    Pls call to discuss.

## 2020-01-29 DIAGNOSIS — G44.219 EPISODIC TENSION-TYPE HEADACHE, NOT INTRACTABLE: ICD-10-CM

## 2020-01-29 RX ORDER — BUTALBITAL, ACETAMINOPHEN AND CAFFEINE 50; 325; 40 MG/1; MG/1; MG/1
TABLET ORAL
Qty: 30 TABLET | Refills: 3 | Status: SHIPPED | OUTPATIENT
Start: 2020-01-29 | End: 2020-04-02

## 2020-02-04 DIAGNOSIS — M51.36 DDD (DEGENERATIVE DISC DISEASE), LUMBAR: ICD-10-CM

## 2020-02-04 RX ORDER — TRAMADOL HYDROCHLORIDE 50 MG/1
TABLET ORAL
Qty: 120 TABLET | Refills: 1 | Status: SHIPPED | OUTPATIENT
Start: 2020-02-04 | End: 2020-04-02

## 2020-02-19 ENCOUNTER — TELEPHONE (OUTPATIENT)
Dept: PRIMARY CARE CLINIC | Facility: CLINIC | Age: 70
End: 2020-02-19

## 2020-02-19 DIAGNOSIS — D64.9 NORMOCYTIC ANEMIA: ICD-10-CM

## 2020-02-19 DIAGNOSIS — E78.2 MIXED HYPERLIPIDEMIA: Primary | ICD-10-CM

## 2020-02-19 NOTE — TELEPHONE ENCOUNTER
----- Message from Tammi Roberts sent at 2/19/2020  4:29 PM CST -----  Contact: Patient [phone]  Patient is requesting orders for lab.    Please call and advise.    Thank You

## 2020-03-26 DIAGNOSIS — E78.2 MIXED HYPERLIPIDEMIA: ICD-10-CM

## 2020-03-26 DIAGNOSIS — F32.A DEPRESSION, UNSPECIFIED DEPRESSION TYPE: ICD-10-CM

## 2020-03-27 RX ORDER — ROSUVASTATIN CALCIUM 20 MG/1
TABLET, COATED ORAL
Qty: 90 TABLET | Refills: 3 | Status: SHIPPED | OUTPATIENT
Start: 2020-03-27 | End: 2021-03-23

## 2020-03-27 RX ORDER — ESCITALOPRAM OXALATE 10 MG/1
TABLET ORAL
Qty: 90 TABLET | Refills: 3 | Status: SHIPPED | OUTPATIENT
Start: 2020-03-27 | End: 2021-03-23

## 2020-04-02 DIAGNOSIS — G44.219 EPISODIC TENSION-TYPE HEADACHE, NOT INTRACTABLE: ICD-10-CM

## 2020-04-02 DIAGNOSIS — M51.36 DDD (DEGENERATIVE DISC DISEASE), LUMBAR: ICD-10-CM

## 2020-04-02 RX ORDER — TRAMADOL HYDROCHLORIDE 50 MG/1
TABLET ORAL
Qty: 120 TABLET | Refills: 1 | Status: SHIPPED | OUTPATIENT
Start: 2020-04-02 | End: 2020-05-28

## 2020-04-02 RX ORDER — BUTALBITAL, ACETAMINOPHEN AND CAFFEINE 50; 325; 40 MG/1; MG/1; MG/1
TABLET ORAL
Qty: 30 TABLET | Refills: 3 | Status: SHIPPED | OUTPATIENT
Start: 2020-04-02 | End: 2020-06-22

## 2020-04-22 DIAGNOSIS — K21.9 GASTROESOPHAGEAL REFLUX DISEASE, ESOPHAGITIS PRESENCE NOT SPECIFIED: ICD-10-CM

## 2020-04-22 RX ORDER — OMEPRAZOLE 40 MG/1
CAPSULE, DELAYED RELEASE ORAL
Qty: 90 CAPSULE | Refills: 1 | Status: SHIPPED | OUTPATIENT
Start: 2020-04-22 | End: 2020-10-21 | Stop reason: SDUPTHER

## 2020-05-04 DIAGNOSIS — C61 PROSTATE CANCER: Primary | ICD-10-CM

## 2020-05-04 DIAGNOSIS — Z90.79 HISTORY OF ROBOT-ASSISTED LAPAROSCOPIC RADICAL PROSTATECTOMY: ICD-10-CM

## 2020-05-21 ENCOUNTER — OFFICE VISIT (OUTPATIENT)
Dept: PRIMARY CARE CLINIC | Facility: CLINIC | Age: 70
End: 2020-05-21
Payer: MEDICARE

## 2020-05-21 VITALS
HEIGHT: 67 IN | DIASTOLIC BLOOD PRESSURE: 70 MMHG | RESPIRATION RATE: 18 BRPM | BODY MASS INDEX: 28.84 KG/M2 | OXYGEN SATURATION: 97 % | SYSTOLIC BLOOD PRESSURE: 132 MMHG | HEART RATE: 77 BPM | WEIGHT: 183.75 LBS | TEMPERATURE: 99 F

## 2020-05-21 DIAGNOSIS — Z12.5 PROSTATE CANCER SCREENING: ICD-10-CM

## 2020-05-21 DIAGNOSIS — I10 ESSENTIAL HYPERTENSION, BENIGN: Primary | ICD-10-CM

## 2020-05-21 DIAGNOSIS — I70.0 ATHEROSCLEROSIS OF ABDOMINAL AORTA: ICD-10-CM

## 2020-05-21 DIAGNOSIS — M19.012 OSTEOARTHRITIS OF GLENOHUMERAL JOINT, LEFT: ICD-10-CM

## 2020-05-21 DIAGNOSIS — E78.2 MIXED HYPERLIPIDEMIA: ICD-10-CM

## 2020-05-21 PROCEDURE — 99214 PR OFFICE/OUTPT VISIT, EST, LEVL IV, 30-39 MIN: ICD-10-PCS | Mod: 25,S$GLB,, | Performed by: FAMILY MEDICINE

## 2020-05-21 PROCEDURE — 1159F MED LIST DOCD IN RCRD: CPT | Mod: S$GLB,,, | Performed by: FAMILY MEDICINE

## 2020-05-21 PROCEDURE — 99999 PR PBB SHADOW E&M-EST. PATIENT-LVL III: ICD-10-PCS | Mod: PBBFAC,,, | Performed by: FAMILY MEDICINE

## 2020-05-21 PROCEDURE — 3075F SYST BP GE 130 - 139MM HG: CPT | Mod: S$GLB,,, | Performed by: FAMILY MEDICINE

## 2020-05-21 PROCEDURE — 1125F PR PAIN SEVERITY QUANTIFIED, PAIN PRESENT: ICD-10-PCS | Mod: S$GLB,,, | Performed by: FAMILY MEDICINE

## 2020-05-21 PROCEDURE — 3075F PR MOST RECENT SYSTOLIC BLOOD PRESS GE 130-139MM HG: ICD-10-PCS | Mod: S$GLB,,, | Performed by: FAMILY MEDICINE

## 2020-05-21 PROCEDURE — 99999 PR PBB SHADOW E&M-EST. PATIENT-LVL III: CPT | Mod: PBBFAC,,, | Performed by: FAMILY MEDICINE

## 2020-05-21 PROCEDURE — 1159F PR MEDICATION LIST DOCUMENTED IN MEDICAL RECORD: ICD-10-PCS | Mod: S$GLB,,, | Performed by: FAMILY MEDICINE

## 2020-05-21 PROCEDURE — 1101F PR PT FALLS ASSESS DOC 0-1 FALLS W/OUT INJ PAST YR: ICD-10-PCS | Mod: S$GLB,,, | Performed by: FAMILY MEDICINE

## 2020-05-21 PROCEDURE — 20610 LARGE JOINT ASPIRATION/INJECTION: L GLENOHUMERAL: ICD-10-PCS | Mod: LT,S$GLB,, | Performed by: FAMILY MEDICINE

## 2020-05-21 PROCEDURE — 99214 OFFICE O/P EST MOD 30 MIN: CPT | Mod: 25,S$GLB,, | Performed by: FAMILY MEDICINE

## 2020-05-21 PROCEDURE — 3078F DIAST BP <80 MM HG: CPT | Mod: S$GLB,,, | Performed by: FAMILY MEDICINE

## 2020-05-21 PROCEDURE — 1125F AMNT PAIN NOTED PAIN PRSNT: CPT | Mod: S$GLB,,, | Performed by: FAMILY MEDICINE

## 2020-05-21 PROCEDURE — 3078F PR MOST RECENT DIASTOLIC BLOOD PRESSURE < 80 MM HG: ICD-10-PCS | Mod: S$GLB,,, | Performed by: FAMILY MEDICINE

## 2020-05-21 PROCEDURE — 20610 DRAIN/INJ JOINT/BURSA W/O US: CPT | Mod: LT,S$GLB,, | Performed by: FAMILY MEDICINE

## 2020-05-21 PROCEDURE — 1101F PT FALLS ASSESS-DOCD LE1/YR: CPT | Mod: S$GLB,,, | Performed by: FAMILY MEDICINE

## 2020-05-21 RX ORDER — METHYLPREDNISOLONE ACETATE 40 MG/ML
40 INJECTION, SUSPENSION INTRA-ARTICULAR; INTRALESIONAL; INTRAMUSCULAR; SOFT TISSUE
Status: DISCONTINUED | OUTPATIENT
Start: 2020-05-21 | End: 2020-05-21 | Stop reason: HOSPADM

## 2020-05-21 RX ADMIN — METHYLPREDNISOLONE ACETATE 40 MG: 40 INJECTION, SUSPENSION INTRA-ARTICULAR; INTRALESIONAL; INTRAMUSCULAR; SOFT TISSUE at 11:05

## 2020-05-21 NOTE — PROGRESS NOTES
"Subjective:       Patient ID: Bay Yadav is a 69 y.o. male.    Chief Complaint: Shoulder Pain (requesting shoulder injection left side) and Follow-up     Hypertension -stable on current regimen  Hyperlipidemia-compliant with meds, most recent labs look good  Left shoulder arthritis-increasing pain over the past few months, has had steroid injections in the past with good results, requesting repeat injection.  No recent injury    Review of Systems   Constitutional: Negative for fever.   HENT: Negative for trouble swallowing.    Eyes: Negative for visual disturbance.   Respiratory: Negative for shortness of breath.    Cardiovascular: Negative for chest pain.   Gastrointestinal: Negative for blood in stool, nausea and vomiting.   Musculoskeletal: Positive for arthralgias.   Allergic/Immunologic: Negative for immunocompromised state.   Neurological: Negative for weakness.   Hematological: Does not bruise/bleed easily.   Psychiatric/Behavioral: Negative for agitation and confusion.       Objective:      Vitals:    05/21/20 1102   BP: 132/70   BP Location: Right arm   Patient Position: Sitting   BP Method: Medium (Manual)   Pulse: 77   Resp: 18   Temp: 98.5 °F (36.9 °C)   TempSrc: Oral   SpO2: 97%   Weight: 83.3 kg (183 lb 12.1 oz)   Height: 5' 7" (1.702 m)     Physical Exam   Constitutional: He is oriented to person, place, and time. He appears well-developed and well-nourished.   HENT:   Head: Normocephalic and atraumatic.   Cardiovascular: Normal rate, regular rhythm and normal heart sounds.   Pulmonary/Chest: Effort normal and breath sounds normal.   Abdominal: Soft. Bowel sounds are normal. He exhibits no distension. There is no tenderness.   Musculoskeletal: He exhibits no edema.        Left shoulder: He exhibits crepitus. He exhibits normal range of motion (Pain with passive range of motion), no tenderness, no swelling, no effusion and no deformity.   Neurological: He is alert and oriented to person, place, " and time.   Skin: Skin is warm and dry.   Psychiatric: He has a normal mood and affect. His behavior is normal.   Nursing note and vitals reviewed.      Lab Results   Component Value Date    WBC 6.60 05/19/2020    HGB 13.5 (L) 05/19/2020    HCT 40.6 05/19/2020     05/19/2020    CHOL 155 05/19/2020    TRIG 117 05/19/2020    HDL 57 05/19/2020    ALT 28 05/19/2020    AST 23 05/19/2020     05/19/2020    K 4.0 05/19/2020     05/19/2020    CREATININE 1.3 05/19/2020    BUN 12 05/19/2020    CO2 28 05/19/2020    INR 1.0 07/21/2006    HGBA1C 5.4 11/13/2019      Assessment:       1. Essential hypertension, benign    2. Mixed hyperlipidemia    3. Atherosclerosis of abdominal aorta    4. Osteoarthritis of glenohumeral joint, left        Plan:       Essential hypertension, benign   well controlled  Mixed hyperlipidemia   stable on current regimen  Atherosclerosis of abdominal aorta   needs to maintain good control blood pressure and cholesterol  Osteoarthritis of glenohumeral joint, left  -     Large Joint Aspiration/Injection: L glenohumeral  -     methylPREDNISolone acetate injection 40 mg      Medication List with Changes/Refills   Current Medications    ACETAMINOPHEN/DIPHENHYDRAMINE (PERCOGESIC ORAL)    Take by mouth.    BENZONATATE (TESSALON) 100 MG CAPSULE    Take 1 capsule by mouth 3 (three) times daily as needed.    BUTALBITAL-ACETAMINOPHEN-CAFFEINE -40 MG (FIORICET, ESGIC) -40 MG PER TABLET    TAKE 1 TABLET BY MOUTH EVERY 6 HOURS AS NEEDED FOR HEADACHE    CEFDINIR (OMNICEF) 300 MG CAPSULE    TK ONE C PO  BID    ESCITALOPRAM OXALATE (LEXAPRO) 10 MG TABLET    TAKE 1 TABLET(10 MG) BY MOUTH EVERY DAY    HYDROCODONE-HOMATROPINE 5-1.5 MG/5 ML (HYCODAN) 5-1.5 MG/5 ML SYRP    Take 5 mLs by mouth every 6 (six) hours as needed (cough).    LISINOPRIL 10 MG TABLET    TAKE 1 TABLET(10 MG) BY MOUTH EVERY DAY    MECLIZINE (ANTIVERT) 25 MG TABLET    TAKE 1 TABLET(25 MG) BY MOUTH THREE TIMES DAILY AS NEEDED     MULTIVITAMIN WITH MINERALS TABLET    Take 1 tablet by mouth once daily.    OMEPRAZOLE (PRILOSEC) 40 MG CAPSULE    TAKE 1 CAPSULE(40 MG) BY MOUTH EVERY DAY    OXYCODONE-ACETAMINOPHEN (PERCOCET) 5-325 MG PER TABLET    Take 1 tablet by mouth every 6 (six) hours as needed for Pain.    POLYETHYLENE GLYCOL (GLYCOLAX) 17 GRAM/DOSE POWDER    Take 17 g by mouth once daily.    ROSUVASTATIN (CRESTOR) 20 MG TABLET    TAKE 1 TABLET(20 MG) BY MOUTH EVERY DAY    TADALAFIL (CIALIS) 20 MG TAB    1 PO QOD    TRAMADOL (ULTRAM) 50 MG TABLET    TAKE 1 TABLET BY MOUTH EVERY 6 HOURS AS NEEDED    VIRTUSSIN AC  MG/5 ML SYRUP    Take 5 mLs by mouth nightly as needed.    VITB6/MAG CIT,OX/POTASSIUM CIT (THERALITH XR ORAL)    Take by mouth.

## 2020-05-21 NOTE — PROCEDURES
Large Joint Aspiration/Injection: L glenohumeral  Date/Time: 5/21/2020 11:00 AM  Performed by: Wayne Brennan MD  Authorized by: Wayne Brennan MD     Consent Done?:  Yes (Verbal)  Indications:  Pain  Site marked: the procedure site was marked    Timeout: prior to procedure the correct patient, procedure, and site was verified    Prep: patient was prepped and draped in usual sterile fashion      Local anesthesia used?: Yes    Anesthesia:  Local infiltration  Local anesthetic:  Lidocaine 1% without epinephrine  Anesthetic total (ml):  2      Details:  Needle Size:  22 G  Ultrasonic Guidance for needle placement?: No    Approach:  Posterior  Location:  Shoulder  Site:  L glenohumeral  Medications:  40 mg methylPREDNISolone acetate 40 mg/mL  Patient tolerance:  Patient tolerated the procedure well with no immediate complications

## 2020-05-27 DIAGNOSIS — M51.36 DDD (DEGENERATIVE DISC DISEASE), LUMBAR: ICD-10-CM

## 2020-05-28 RX ORDER — TRAMADOL HYDROCHLORIDE 50 MG/1
TABLET ORAL
Qty: 120 TABLET | Refills: 2 | Status: SHIPPED | OUTPATIENT
Start: 2020-05-28 | End: 2020-08-24

## 2020-06-11 ENCOUNTER — OFFICE VISIT (OUTPATIENT)
Dept: UROLOGY | Facility: CLINIC | Age: 70
End: 2020-06-11
Payer: MEDICARE

## 2020-06-11 VITALS
BODY MASS INDEX: 28.09 KG/M2 | HEART RATE: 71 BPM | HEIGHT: 67 IN | WEIGHT: 179 LBS | DIASTOLIC BLOOD PRESSURE: 70 MMHG | SYSTOLIC BLOOD PRESSURE: 117 MMHG

## 2020-06-11 DIAGNOSIS — R10.30 INGUINAL PAIN, UNSPECIFIED LATERALITY: ICD-10-CM

## 2020-06-11 DIAGNOSIS — N52.31 ERECTILE DYSFUNCTION FOLLOWING RADICAL PROSTATECTOMY: ICD-10-CM

## 2020-06-11 DIAGNOSIS — N39.3 STRESS INCONTINENCE OF URINE: ICD-10-CM

## 2020-06-11 DIAGNOSIS — R73.03 BORDERLINE DIABETES: ICD-10-CM

## 2020-06-11 DIAGNOSIS — C61 PROSTATE CANCER: Primary | ICD-10-CM

## 2020-06-11 DIAGNOSIS — N20.0 NEPHROLITHIASIS: ICD-10-CM

## 2020-06-11 DIAGNOSIS — I10 ESSENTIAL HYPERTENSION, BENIGN: ICD-10-CM

## 2020-06-11 PROCEDURE — 99214 PR OFFICE/OUTPT VISIT, EST, LEVL IV, 30-39 MIN: ICD-10-PCS | Mod: S$GLB,,, | Performed by: UROLOGY

## 2020-06-11 PROCEDURE — 99999 PR PBB SHADOW E&M-EST. PATIENT-LVL V: ICD-10-PCS | Mod: PBBFAC,,, | Performed by: UROLOGY

## 2020-06-11 PROCEDURE — 3074F PR MOST RECENT SYSTOLIC BLOOD PRESSURE < 130 MM HG: ICD-10-PCS | Mod: S$GLB,,, | Performed by: UROLOGY

## 2020-06-11 PROCEDURE — 1159F MED LIST DOCD IN RCRD: CPT | Mod: S$GLB,,, | Performed by: UROLOGY

## 2020-06-11 PROCEDURE — 1101F PR PT FALLS ASSESS DOC 0-1 FALLS W/OUT INJ PAST YR: ICD-10-PCS | Mod: S$GLB,,, | Performed by: UROLOGY

## 2020-06-11 PROCEDURE — 3078F PR MOST RECENT DIASTOLIC BLOOD PRESSURE < 80 MM HG: ICD-10-PCS | Mod: S$GLB,,, | Performed by: UROLOGY

## 2020-06-11 PROCEDURE — 1125F AMNT PAIN NOTED PAIN PRSNT: CPT | Mod: S$GLB,,, | Performed by: UROLOGY

## 2020-06-11 PROCEDURE — 1101F PT FALLS ASSESS-DOCD LE1/YR: CPT | Mod: S$GLB,,, | Performed by: UROLOGY

## 2020-06-11 PROCEDURE — 87086 URINE CULTURE/COLONY COUNT: CPT

## 2020-06-11 PROCEDURE — 1159F PR MEDICATION LIST DOCUMENTED IN MEDICAL RECORD: ICD-10-PCS | Mod: S$GLB,,, | Performed by: UROLOGY

## 2020-06-11 PROCEDURE — 3074F SYST BP LT 130 MM HG: CPT | Mod: S$GLB,,, | Performed by: UROLOGY

## 2020-06-11 PROCEDURE — 99999 PR PBB SHADOW E&M-EST. PATIENT-LVL V: CPT | Mod: PBBFAC,,, | Performed by: UROLOGY

## 2020-06-11 PROCEDURE — 3078F DIAST BP <80 MM HG: CPT | Mod: S$GLB,,, | Performed by: UROLOGY

## 2020-06-11 PROCEDURE — 99214 OFFICE O/P EST MOD 30 MIN: CPT | Mod: S$GLB,,, | Performed by: UROLOGY

## 2020-06-11 PROCEDURE — 1125F PR PAIN SEVERITY QUANTIFIED, PAIN PRESENT: ICD-10-PCS | Mod: S$GLB,,, | Performed by: UROLOGY

## 2020-06-11 RX ORDER — TADALAFIL 20 MG/1
TABLET ORAL
Qty: 30 TABLET | Refills: 12 | Status: SHIPPED | OUTPATIENT
Start: 2020-06-11 | End: 2021-06-18 | Stop reason: SDUPTHER

## 2020-06-11 NOTE — PROGRESS NOTES
Subjective:       Patient ID: Bay Yadav is a 69 y.o. male.    Chief Complaint: Groin Pain (on and off for 3 years. Per wife he has a history of kidney stones.)    Bay Yadav is a 68 y.o. Male with a history of Prostate Cancer; s/p RALP 11/18/2019.  [place] 6 (3+3) with (-) extension, margins, & SV;   No family history of prostate cancer.   His brother recovering from brain surgery.   Initial Dx on 08/21/2019 with PSA of 3.35: [place] 7(3+7)    Having worsening dysuria and pressure in penis.   Testicles hurt. Shooting pain in groin area - not side specific.   He feels like he can't get the last little bit of pee out.     Teresa was removed 11/26/2019    Started cialis in January. He had a nose bleed and hemorrhoid bleed and he thought it was related.  He has restarted it.   He reports he still having some stress incontinence; one pad a day but uses a paper towel as well.  He did kegel exercises.   He passed 2 kidney stones prior to this appt    He has peyronies - and has seen Dr. Georges before for that                Lab Results       Component                Value               Date                       PSADIAG                  <0.10               05/04/2020                 PSADIAG                  <0.10               12/31/2019                   Past Medical History:  No date: Acid reflux  No date: Cervical disc herniation  No date: Depression  No date: Heart murmur  No date: Hyperlipidemia  No date: Lumbar disc herniation  No date: Nephrolithiasis  No date: Upper GI bleed    Past Surgical History:  No date: EYE SURGERY  11/18/2019: ROBOT-ASSISTED LAPAROSCOPIC PELVIC LYMPHADENECTOMY USING   DA ASHLEY XI; N/A      Comment:  Procedure: XI ROBOTIC LYMPHADENECTOMY, PELVIC;  Surgeon:               Jerardo Clements MD;  Location: [facility] OR [address] [place] [address];                 Service: Urology;  Laterality: N/A;  11/18/2019: ROBOT-ASSISTED LAPAROSCOPIC PROSTATECTOMY USING DA ASHLEY   XI; N/A      Comment:  Procedure:  XI ROBOTIC PROSTATECTOMY;  Surgeon: Jerardo Clements MD;  Location: [facility] OR [address] [place] [address];  Service:                Urology;  Laterality: N/A;  3hrs/ gen with regional    Review of patient's family history indicates:  Problem: Cancer      Relation: Mother          Age of Onset: (Not Specified)  Problem: Cancer      Relation: Father          Age of Onset: (Not Specified)  Problem: Cancer      Relation: Brother          Age of Onset: (Not Specified)      Social History    Socioeconomic History      Marital status:       Spouse name: Not on file      Number of children: Not on file      Years of education: Not on file      Highest education level: Not on file    Occupational History      Not on file    Social Needs      Financial resource strain: Not on file      Food insecurity:        Worry: Not on file        Inability: Not on file      Transportation needs:        Medical: Not on file        Non-medical: Not on file    Tobacco Use      Smoking status: Former Smoker      Smokeless tobacco: Never Used    Substance and Sexual Activity      Alcohol use: No      Drug use: No      Sexual activity: Not on file    Lifestyle      Physical activity:        Days per week: Not on file        Minutes per session: Not on file      Stress: Not on file    Relationships      Social connections:        Talks on phone: Not on file        Gets together: Not on file        Attends Mandaeism service: Not on file        Active member of club or organization: Not on file        Attends meetings of clubs or organizations: Not on file        Relationship status: Not on file    Other Topics      Concerns:        Not on file    Social History Narrative      Not on file      Allergies:  Asa (aspirin)    Medications:  Current Outpatient Medications:   acetaminophen/diphenhydramine (PERCOGESIC ORAL), Take by mouth., Disp: , Rfl:    butalbital-acetaminophen-caffeine -40 mg (FIORICET, ESGIC) -40 mg per tablet, TAKE  1 TABLET BY MOUTH EVERY 6 HOURS AS NEEDED FOR HEADACHES, Disp: 45 tablet, Rfl: 1  cefdinir (OMNICEF) 300 MG capsule, TK ONE C PO  BID, Disp: , Rfl: 0  escitalopram oxalate (LEXAPRO) 10 MG tablet, TAKE 1 TABLET(10 MG) BY MOUTH EVERY DAY, Disp: 90 tablet, Rfl: 3  hydrocodone-homatropine 5-1.5 mg/5 ml (HYCODAN) 5-1.5 mg/5 mL Syrp, Take 5 mLs by mouth every 6 (six) hours as needed (cough)., Disp: 120 mL, Rfl: 0  lisinopril 10 MG tablet, TAKE 1 TABLET(10 MG) BY MOUTH EVERY DAY, Disp: 90 tablet, Rfl: 3  meclizine (ANTIVERT) 25 mg tablet, TAKE 1 TABLET(25 MG) BY MOUTH THREE TIMES DAILY AS NEEDED, Disp: 30 tablet, Rfl: 1  multivitamin with minerals tablet, Take 1 tablet by mouth once daily., Disp: , Rfl:   nitrofurantoin (MACRODANTIN) 50 MG capsule, Take 1 capsule (50 mg total) by mouth once daily for 7 days, Disp: 7 capsule, Rfl: 0  omeprazole (PRILOSEC) 40 MG capsule, TAKE 1 CAPSULE(40 MG) BY MOUTH EVERY DAY, Disp: 90 capsule, Rfl: 1  oxyCODONE-acetaminophen (PERCOCET) 5-325 mg per tablet, Take 1 tablet by mouth every 6 (six) hours as needed for Pain., Disp: 11 tablet, Rfl: 0  polyethylene glycol (GLYCOLAX) 17 gram/dose powder, Take 17 g by mouth once daily., Disp: 237 g, Rfl: 0  rosuvastatin (CRESTOR) 20 MG tablet, TAKE 1 TABLET(20 MG) BY MOUTH EVERY DAY, Disp: 90 tablet, Rfl: 3  traMADol (ULTRAM) 50 mg tablet, TAKE 1 TABLET BY MOUTH EVERY 6 HOURS AS NEEDED, Disp: 120 tablet, Rfl: 2  vitB6/mag cit,ox/potassium cit (THERALITH XR ORAL), Take by mouth., Disp: , Rfl:         Groin Pain   The patient's pertinent negatives include no penile pain, scrotal swelling or testicular pain. Pertinent negatives include no abdominal pain, chest pain, chills, constipation, diarrhea, dysuria, fever, flank pain, nausea, rash, shortness of breath or vomiting. His past medical history is significant for erectile dysfunction.     Review of Systems   Constitutional: Negative for activity change, appetite change, chills and fever.    HENT: Negative for facial swelling and trouble swallowing.    Eyes: Negative for visual disturbance.   Respiratory: Negative for chest tightness and shortness of breath.    Cardiovascular: Negative for chest pain and palpitations.   Gastrointestinal: Negative.  Negative for abdominal pain, constipation, diarrhea, nausea and vomiting.   Genitourinary: Positive for enuresis. Negative for difficulty urinating, dysuria, flank pain, hematuria, penile pain, penile swelling, scrotal swelling and testicular pain.        He having some pelvic discomfort; but that is chronic; it was present prior to surgery.     Musculoskeletal: Negative for back pain, gait problem, myalgias and neck stiffness.   Skin: Negative for rash.   Neurological: Negative for dizziness and speech difficulty.   Hematological: Does not bruise/bleed easily.   Psychiatric/Behavioral: Negative for behavioral problems.       Objective:      Physical Exam   Nursing note and vitals reviewed.  Constitutional: He is oriented to person, place, and time. Vital signs are normal. He appears well-developed and well-nourished.  Non-toxic appearance. He does not have a sickly appearance.   HENT:   Head: Normocephalic and atraumatic.   Right Ear: External ear normal.   Left Ear: External ear normal.   Nose: Nose normal.   Mouth/Throat: Mucous membranes are normal.   Eyes: Conjunctivae and lids are normal. No scleral icterus.   Neck: Trachea normal, normal range of motion and full passive range of motion without pain. Neck supple. No JVD present. No tracheal deviation present.   Cardiovascular: Normal rate, S1 normal and S2 normal.    Pulmonary/Chest: Effort normal. No respiratory distress. He exhibits no tenderness.   Abdominal: Soft. Normal appearance. There is no hepatosplenomegaly. There is no tenderness. There is no CVA tenderness.   Genitourinary: Testes normal and penis normal. Circumcised.   Musculoskeletal: Normal range of motion.   Neurological: He is alert  and oriented to person, place, and time. He has normal strength.   Skin: Skin is warm, dry and intact.     Psychiatric: He has a normal mood and affect. His behavior is normal. Judgment and thought content normal.     urine dip trace blood  Assessment:       1. Prostate cancer    2. Nephrolithiasis    3. Essential hypertension, benign    4. Borderline diabetes    5. Inguinal pain, unspecified laterality    6. Stress incontinence of urine    7. Erectile dysfunction following radical prostatectomy        Plan:     kub and ultrasound and urine culture - f/u groin pain and history of stones.   Discussed kegel exercises. Offered to refer to PT for stress incontinence.   His erections still aren't great, but helps with the cialis. Needs a new script. Will send.   psa in 6 months. 11/2020

## 2020-06-12 LAB — BACTERIA UR CULT: NO GROWTH

## 2020-08-10 ENCOUNTER — OFFICE VISIT (OUTPATIENT)
Dept: UROLOGY | Facility: CLINIC | Age: 70
End: 2020-08-10
Payer: MEDICARE

## 2020-08-10 VITALS
BODY MASS INDEX: 28.55 KG/M2 | SYSTOLIC BLOOD PRESSURE: 128 MMHG | WEIGHT: 181.88 LBS | DIASTOLIC BLOOD PRESSURE: 80 MMHG | HEIGHT: 67 IN | HEART RATE: 81 BPM

## 2020-08-10 DIAGNOSIS — N48.6 PEYRONIE'S DISEASE: ICD-10-CM

## 2020-08-10 DIAGNOSIS — E78.2 MIXED HYPERLIPIDEMIA: ICD-10-CM

## 2020-08-10 DIAGNOSIS — C61 PROSTATE CANCER: ICD-10-CM

## 2020-08-10 DIAGNOSIS — N52.31 ERECTILE DYSFUNCTION FOLLOWING RADICAL PROSTATECTOMY: Primary | ICD-10-CM

## 2020-08-10 DIAGNOSIS — I10 ESSENTIAL HYPERTENSION, BENIGN: ICD-10-CM

## 2020-08-10 PROBLEM — N39.3 STRESS INCONTINENCE OF URINE: Status: RESOLVED | Noted: 2020-06-11 | Resolved: 2020-08-10

## 2020-08-10 PROCEDURE — 1126F AMNT PAIN NOTED NONE PRSNT: CPT | Mod: S$GLB,,, | Performed by: UROLOGY

## 2020-08-10 PROCEDURE — 1126F PR PAIN SEVERITY QUANTIFIED, NO PAIN PRESENT: ICD-10-PCS | Mod: S$GLB,,, | Performed by: UROLOGY

## 2020-08-10 PROCEDURE — 99214 OFFICE O/P EST MOD 30 MIN: CPT | Mod: S$GLB,,, | Performed by: UROLOGY

## 2020-08-10 PROCEDURE — 99999 PR PBB SHADOW E&M-EST. PATIENT-LVL IV: ICD-10-PCS | Mod: PBBFAC,,, | Performed by: UROLOGY

## 2020-08-10 PROCEDURE — 1159F PR MEDICATION LIST DOCUMENTED IN MEDICAL RECORD: ICD-10-PCS | Mod: S$GLB,,, | Performed by: UROLOGY

## 2020-08-10 PROCEDURE — 1101F PT FALLS ASSESS-DOCD LE1/YR: CPT | Mod: S$GLB,,, | Performed by: UROLOGY

## 2020-08-10 PROCEDURE — 3074F PR MOST RECENT SYSTOLIC BLOOD PRESSURE < 130 MM HG: ICD-10-PCS | Mod: S$GLB,,, | Performed by: UROLOGY

## 2020-08-10 PROCEDURE — 1159F MED LIST DOCD IN RCRD: CPT | Mod: S$GLB,,, | Performed by: UROLOGY

## 2020-08-10 PROCEDURE — 3074F SYST BP LT 130 MM HG: CPT | Mod: S$GLB,,, | Performed by: UROLOGY

## 2020-08-10 PROCEDURE — 99214 PR OFFICE/OUTPT VISIT, EST, LEVL IV, 30-39 MIN: ICD-10-PCS | Mod: S$GLB,,, | Performed by: UROLOGY

## 2020-08-10 PROCEDURE — 3008F PR BODY MASS INDEX (BMI) DOCUMENTED: ICD-10-PCS | Mod: S$GLB,,, | Performed by: UROLOGY

## 2020-08-10 PROCEDURE — 1101F PR PT FALLS ASSESS DOC 0-1 FALLS W/OUT INJ PAST YR: ICD-10-PCS | Mod: S$GLB,,, | Performed by: UROLOGY

## 2020-08-10 PROCEDURE — 3079F DIAST BP 80-89 MM HG: CPT | Mod: S$GLB,,, | Performed by: UROLOGY

## 2020-08-10 PROCEDURE — 3008F BODY MASS INDEX DOCD: CPT | Mod: S$GLB,,, | Performed by: UROLOGY

## 2020-08-10 PROCEDURE — 99999 PR PBB SHADOW E&M-EST. PATIENT-LVL IV: CPT | Mod: PBBFAC,,, | Performed by: UROLOGY

## 2020-08-10 PROCEDURE — 3079F PR MOST RECENT DIASTOLIC BLOOD PRESSURE 80-89 MM HG: ICD-10-PCS | Mod: S$GLB,,, | Performed by: UROLOGY

## 2020-08-10 NOTE — PROGRESS NOTES
Mr. Yadav is a 69 y.o. male presenting for a consultation at the request of Dr. Clements. Patient presents with ED.    PRESENTING ILLNESS:    Bay Yadav is a 69 y.o. male with prostate cancer s/p RALP on 11/18/19 by Dr. Clements.  Since then, he c/o ED.  Prior to surgery, he did not have ED.  However, he did have peyronie's disease.  He's s/p xiaflex with no improvement.  He has 90 degrees of curve to the L.  It is difficult to penetrate due to the curve.  It's been present for  > 1 year.    He does not have OCTAVIA.  He is wearing 0 pads/day.  No hematuria.  No dysuria.  Good FOS.    No bone pain or weight loss.    REVIEW OF SYSTEMS:    Bay Yadav denies chest pain, sore throat, headache, blurred vision, fever, nausea, vomiting, chills, abdominal pain, chest pain, sore throat, bleeding per rectum, cough, SOB, recent loss of consciousness, recent mental status changes, seizures, dizziness, or upper or lower extremity weakness.    RADHA  1. 1  2. 0  3. 0  4. 0  5. 0      PATIENT HISTORY:    Past Medical History:   Diagnosis Date    Acid reflux     Atherosclerosis of abdominal aorta     Borderline diabetes     Cervical disc herniation     DDD (degenerative disc disease), cervical     DDD (degenerative disc disease), lumbar     Depression     Erectile dysfunction following radical prostatectomy     GERD (gastroesophageal reflux disease)     Heart murmur     Hyperlipidemia     Lumbar disc herniation     Nephrolithiasis     Peyronie's disease     Prostate cancer     Upper GI bleed        Family History   Problem Relation Age of Onset    Cancer Mother     Cancer Father     Cancer Brother        Past Surgical History:   Procedure Laterality Date    EYE SURGERY      ROBOT-ASSISTED LAPAROSCOPIC PELVIC LYMPHADENECTOMY USING DA ASHLEY XI N/A 11/18/2019    Procedure: XI ROBOTIC LYMPHADENECTOMY, PELVIC;  Surgeon: Jerardo Clements MD;  Location: [facility] OR [address] [place] [address];  Service: Urology;  Laterality: N/A;     ROBOT-ASSISTED LAPAROSCOPIC PROSTATECTOMY USING DA ASHLEY XI N/A 11/18/2019    Procedure: XI ROBOTIC PROSTATECTOMY;  Surgeon: Jerardo Clements MD;  Location: [facility] OR [address] [place] [address];  Service: Urology;  Laterality: N/A;  3hrs/ gen with regional       Social History     Socioeconomic History    Marital status:      Spouse name: Not on file    Number of children: Not on file    Years of education: Not on file    Highest education level: Not on file   Occupational History    Not on file   Social Needs    Financial resource strain: Not on file    Food insecurity     Worry: Not on file     Inability: Not on file    Transportation needs     Medical: Not on file     Non-medical: Not on file   Tobacco Use    Smoking status: Former Smoker    Smokeless tobacco: Never Used   Substance and Sexual Activity    Alcohol use: No    Drug use: No    Sexual activity: Not on file   Lifestyle    Physical activity     Days per week: Not on file     Minutes per session: Not on file    Stress: Not on file   Relationships    Social connections     Talks on phone: Not on file     Gets together: Not on file     Attends L[employer] day service: Not on file     Active member of club or organization: Not on file     Attends meetings of clubs or organizations: Not on file     Relationship status: Not on file   Other Topics Concern    Not on file   Social History Narrative    Not on file       Review of patient's allergies indicates:   Allergen Reactions    Asa [aspirin]      Gastro bleed         Current Outpatient Medications:     acetaminophen/diphenhydramine (PERCOGESIC ORAL), Take by mouth., Disp: , Rfl:     butalbital-acetaminophen-caffeine -40 mg (FIORICET, ESGIC) -40 mg per tablet, TAKE 1 TABLET BY MOUTH EVERY 6 HOURS AS NEEDED FOR HEADACHE, Disp: 30 tablet, Rfl: 3    escitalopram oxalate (LEXAPRO) 10 MG tablet, TAKE 1 TABLET(10 MG) BY MOUTH EVERY DAY, Disp: 90 tablet, Rfl: 3    lisinopril 10 MG tablet, TAKE  1 TABLET(10 MG) BY MOUTH EVERY DAY, Disp: 90 tablet, Rfl: 3    meclizine (ANTIVERT) 25 mg tablet, TAKE 1 TABLET(25 MG) BY MOUTH THREE TIMES DAILY AS NEEDED, Disp: 30 tablet, Rfl: 1    multivitamin with minerals tablet, Take 1 tablet by mouth once daily., Disp: , Rfl:     omeprazole (PRILOSEC) 40 MG capsule, TAKE 1 CAPSULE(40 MG) BY MOUTH EVERY DAY, Disp: 90 capsule, Rfl: 1    polyethylene glycol (GLYCOLAX) 17 gram/dose powder, Take 17 g by mouth once daily., Disp: 237 g, Rfl: 0    rosuvastatin (CRESTOR) 20 MG tablet, TAKE 1 TABLET(20 MG) BY MOUTH EVERY DAY, Disp: 90 tablet, Rfl: 3    tadalafiL (CIALIS) 20 MG Tab, Take 1/2 to 1 tablet every 3 days as needed., Disp: 30 tablet, Rfl: 12    traMADoL (ULTRAM) 50 mg tablet, TAKE 1 TABLET BY MOUTH EVERY 6 HOURS AS NEEDED, Disp: 120 tablet, Rfl: 2    vitB6/mag cit,ox/potassium cit (THERALITH XR ORAL), Take by mouth., Disp: , Rfl:       PHYSICAL EXAMINATION:    The patient generally appears in good health, is appropriately interactive, and is in no apparent distress.     Eyes: anicteric sclerae, moist conjunctivae; no lid-lag; PERRLA     HENT: Atraumatic; oropharynx clear with moist mucous membranes and no mucosal ulcerations;normal hard and soft palate. No evidence of lymphadenopathy.    Neck: Trachea midline.  No thyromegaly.    Musculoskeletal: No abnormal gait.    Skin: No lesions.    Mental: Cooperative with normal affect.  Is oriented to time, place, and person.    Neuro: Grossly intact.    Chest: Normal inspiratory effort.   No accessory muscles.  No audible wheezes.  Respirations symmetric on inspiration and expiration.    Heart: Regular rhythm.      Abdomen:  Soft, non-tender. No masses or organomegaly. Bladder is not palpable. No evidence of flank discomfort. No evidence of inguinal hernia.    Genitourinary: The penis is circumcised with a plaque c/w peyronie's on the shaft.  he urethral meatus is normal. The testes, epididymides, and cord structures are  normal in size and contour bilaterally. The scrotum is normal in size and contour.    Extremities: No clubbing, cyanosis, or edema        LABS:    UA dipped negative today  Lab Results   Component Value Date    PSADIAG <0.10 05/04/2020    PSADIAG <0.10 12/31/2019        IMPRESSION:    Encounter Diagnoses   Name Primary?    Erectile dysfunction following radical prostatectomy Yes    Peyronie's disease     Stress incontinence of urine     Prostate cancer     Essential hypertension, benign     Mixed hyperlipidemia    HTN, controlled  Hyperlipidemia, controlled    PLAN:    1. Discussed options for his ED today.  Discussed that he has both ED and peyronie's.  He'd like an IPP.  2. Patient read the IPP handout and understands the risks associated with this procedure. He knows the risk of infection as well as surgery requirements if it were to become infected. He understands the impact on spontaneous and nocturnal erections, as well as need for replacement and repair, which was clearly outlined in verbal and written form. He was given the chance to ask questions and alternatives were discussed.  3. Will plan for penile modeling vs reconstruction at time of IPP.  4. Risks discussed were You have elected to undergo placement of a penile prosthesis. Several devices are currently available in the marketplace, including those which are non-inflatable, versus two- or three-piece inflatable prostheses. All of these devices have the capacity to give you the opportunity to have a rigid penis on demand and to be used as frequently and as long as you would like. There are, therefore, many advantages to the use of the prosthesis which you have already discussed with your physician. The goal of this informed consent form is to identify the potential problems that have been recognized to occur with penile prosthesis placement and that you understand the risks of undergoing prosthetic placement. It is important to recognize that as  a result of improvements in design as well as better surgical technique, that all of the risks listed below are less than they were even 10 years ago. It is also important to recognize that most of the available studies report on historical data for devices which are now either not manufactured or have undergone structural improvements to reduce those side effects. The complications are simply noted in random order and do not reflect their frequency.    1. Prosthesis mechanical failure occurs as a result of leakage of fluid from the inflatable types of devices. This typically occurs as a result of a fracture in the tubing, most commonly as it emerges out of the scrotal pump, but it can also be due to a leak from the erectile cylinders in the penis. It is felt that this occurs as a result of repetitive mechanical trauma, which weakens the tubing and causes it to crack. When the fluid leaks, it is not something you would be aware of. It does not cause pain. The fluid contained within the device is typically a sterile saline solution that will simply be reabsorbed by the body. What you will recognize is that when you squeeze the pump, there will be no transfer of fluid and no rigidity or inadequate rigidity. The most recent long-term data looking at 5-10 year success reports mechanical failure in the 5-10% range over that period of time. Looked at another way, 90-95% of men will have a functional prosthesis in 10 years. These devices are designed to be used for life. Each company has its own warrantee which you should discuss with your physician.    2. Infection is a disastrous complication which usually requires the removal of the prosthesis, as it is rare to be able to clear infection so long as the prosthesis is within the body. Occasionally, the infected prosthesis can be removed, the location of the device can be washed out vigorously with a special antibiotic salvage procedure and then a new device may be able to  "be immediately placed. When this is not possible, the infected device is removed and then a period of healing will follow where the device can be replaced after a period of healing (6 weeks to 6 months). Delayed replacement of a prosthesis after initial removal is a more complicated operation associated with the potential for not being able to replace the device because of scarring but other problems such as shortening of the penis or change in sensation and shape may also occur.    As a result of improved surgical technique and device design, infection rates are now markedly reduced, typically being reported in the <1-2% range for new prosthesis placements and up to 3% when the prosthesis is uninfected and has mechanically failed.    3. Bleeding and hematoma are rarely a problem. There is likely to be localized swelling as well as "black and blue" of the penis, groin area, and scrotal sack. These will resolve without treatment over 1-3 weeks. Rarely a scrotal hematoma (collection of blood) will develop which can be treated with rest; it will reabsorb with time or, if it is growing in size or painful, it may need to be evacuated surgically (opened up and washed out). The risk of needing a blood transfusion after penile prosthesis placement is extremely rare to nonexistent.    4. Post-operative pain is variable and can be minimal, but in most men it is quite significant. Your physician will provide you with adequate pain medication to help control the pain, but not necessarily eliminate it entirely. As the prosthesis heals, there will be complete resolution of the pain, such that you will typically not even be aware that the prosthesis is within your body unless you were to touch it directly. Complete pain resolution will most commonly occur within 4-12 weeks postoperatively. Post-operative pain is typically managed with oral narcotic agents. You should be aware that these drugs can cause constipation as well as " sleepiness; therefore, you should not be in any position where you might need to make important decision or driving until the pain is under better control without the need for narcotic pain killers. It is recommended that during the first several days after the operation, that you spend as little time as possible on your feet, as this will encourage healing, reduce swelling, and result in more rapid resolution of pain.    5. Loss of length -  Penile shortening is probably the reason that most men are disappointed with the outcome from their prosthesis surgery. Studies have shown that when the flaccid penile stretched length is measured preoperatively, that the actual loss of length following placement of the prosthesis is on average no more than one centimeter (1/3 inch). To reduce the likelihood of loss of length, the surgeon will do his best to place the proper size device that fits your penis. You can expect that the length of your penis will be much like what you see when you grab the head of the penis and pull it straight out away from your body. Many men who believe they have lost length in their penis following prosthesis surgery in fact did not really lose length because of the surgery, but rather because they have not had had a full erection for some time during which there may have been some loss of tissue elasticity and thereby shortening of the penis. In addition, they may have gained some weight in the pubic area which will cover the penis and make it look shorter. Lastly, they may be expecting that the postoperative result will be like the erections they had when they were a much younger man. Although the goal is to make the penis as long as possible, one can expect that the rigidity of the penis will be much like the erections obtained as a younger man.    6. Decreased girth - Girth is typically not substantially changed as most cylinders can expand and fill the penis satisfactorily, but if there has  been scarring within the tissues of the penis, this can prevent expansion and result in a narrower appearing penis. The surgeon will do his best to put the largest cylinder in the penis, but there are limitations to which the tissues can expand. Decreased girth is not a common complaint.    7. Sensory loss - By and large the surgical techniques being used to avoid injury to the sensory nerves of the penis. Therefore, there is rarely any significant change in the sexual sensation of the penis. Some men find that it takes longer for them to experience orgasm. This may occur because they can simply inflate the penis without any sexuall arousal, and then it will seem to take longer for them to become aroused, resulting in the prolonged time to orgasm. Therefore, proper sexual stimulation is important to enjoy the entire sexual experience with a prosthesis.    8. Change in shape - The overall shape or configuration of the penis is rarely altered as a result of placement of any type of prosthesis, but if there is some unidentified scarring involving the penis such as that which occurs with Peyronie's disease, some curvature or indentations including hourglass narrowing can be identified along the shaft. Typically, in the postoperative period, the prosthesis will cause an internal tissue expansion which will correct these deformities. This may take 6-9 months occur.    9. Erosion or cylinder extrusion - If the tissues in the tips of the penis are weakened either by previous internal penile disease or as a result of the surgery, the prosthetic cylinder may migrate distally into the head of the penis and may appear to be ready to poke through the skin or the urethra. By all means, if such an irregularity is seen, one should address it with your doctor before the prosthesis is exposed so that it can be corrected without developing infection. This problem occurs in <1% of cases.    10. Pump problems - These include difficulty  "in activating or deactivating the pump as well as change of pump location due to migration or fixation of the pump to the scrotal skin. These problems are also quite unusual but can happen as a result of an altered healing process or a malposition of the pump by the implanting surgeon. If the pump were to migrate or become fixed or difficult to manipulate because of its position, a simple outpatient scrotal procedure can be performed to reposition the pump which is almost universally successful. This procedure is performed in no more than 1% of cases.    Prosthesis care - In the postoperative period, it is best to take the antibiotics prescribed by your physician, reduce your physical activity to reduce swelling and enhance healing, take pain medicine for your comfort, and avoid submergingthe incision during bathing for a minimum of 1-4 weeks. Specific bathing instructions will be issued by your physician. It is not uncommon to have an inflatable prosthesis partially fill during the postoperative period involuntarily. This is called "auto-inflation." To prevent this problem, it is important that once the prosthesis is activated, which is typically 4-6 weeks after surgery, that you perform what is known as "cycling" of the device. Cycling means complete inflation and then complete deflation of the penile cylinders twice per day for one month. In doing this, the tissues around the prosthesis are softened and stretched allowing complete deflation of the device into the reservoir. It also will allow you to become more familiar with operating the device and make it easier for you to activate it quickly and inconspicuously when you want to engage in sexual relations. If you have an inflatable device with a scrotal pump, it is best to inflate it without twisting it. The repetitive twisting of the pump can weaken the connections between the tubing and the pump and is the most common cause for mechanical failure of the " prosthesis.    It is hoped that this review will inform you of the potential problems associated with your prosthesis and as a result, will make for more realistic expectations regarding the outcome.      Copy to:

## 2020-08-10 NOTE — LETTER
August 10, 2020      Wayne Brennan MD  [address]  [address]  [facility] [address]           [facility] - Urology 4th Floor  [address]  [place] [address]  Phone: [phone]          Patient: Bay Yadav   MR Number: [id number]   YOB: 1950   Date of Visit: 8/10/2020       Dear Dr. Wayne Brennan:    Thank you for referring Bay Yadav to me for evaluation. Attached you will find relevant portions of my assessment and plan of care.    If you have questions, please do not hesitate to call me. I look forward to following Bay Yadav along with you.    Sincerely,    Peter Georges MD    Enclosure  CC:  No Recipients    If you would like to receive this communication electronically, please contact [email or web address] or [phone] to request more information on [facility] access.    For providers and/or their staff who would like to refer a patient to [facility], please contact us through our one-stop-shop provider referral line, [facility], at [phone].    If you feel you have received this communication in error or would no longer like to receive these types of communications, please e-mail [email or web address]

## 2020-08-13 ENCOUNTER — TELEPHONE (OUTPATIENT)
Dept: UROLOGY | Facility: CLINIC | Age: 70
End: 2020-08-13

## 2020-08-13 DIAGNOSIS — N52.31 ERECTILE DYSFUNCTION FOLLOWING RADICAL PROSTATECTOMY: Primary | ICD-10-CM

## 2020-08-13 DIAGNOSIS — N52.9 ERECTILE DYSFUNCTION: ICD-10-CM

## 2020-08-23 DIAGNOSIS — M51.36 DDD (DEGENERATIVE DISC DISEASE), LUMBAR: ICD-10-CM

## 2020-08-24 RX ORDER — TRAMADOL HYDROCHLORIDE 50 MG/1
TABLET ORAL
Qty: 120 TABLET | Refills: 0 | Status: SHIPPED | OUTPATIENT
Start: 2020-08-24 | End: 2020-09-23

## 2020-08-31 ENCOUNTER — TELEPHONE (OUTPATIENT)
Dept: UROLOGY | Facility: CLINIC | Age: 70
End: 2020-08-31

## 2020-08-31 NOTE — TELEPHONE ENCOUNTER
----- Message from Cristiana Menendez LPN sent at 8/31/2020  3:12 PM CDT -----  Regarding: FW: Pt  Pt wants to cancel his surgery. Please see below.  Cristiana   ----- Message -----  From: Carlita Nino  Sent: 8/31/2020   2:36 PM CDT  To: [facility] Staff  Subject: Pt                                               Reason; Pt calling to cancel surgery 10/8. Please call     Communication: [phone]

## 2020-09-23 DIAGNOSIS — M51.36 DDD (DEGENERATIVE DISC DISEASE), LUMBAR: ICD-10-CM

## 2020-09-23 RX ORDER — TRAMADOL HYDROCHLORIDE 50 MG/1
TABLET ORAL
Qty: 120 TABLET | Refills: 1 | Status: SHIPPED | OUTPATIENT
Start: 2020-09-23 | End: 2020-11-19 | Stop reason: SDUPTHER

## 2020-09-30 DIAGNOSIS — C61 PROSTATE CANCER: Primary | ICD-10-CM

## 2020-10-01 ENCOUNTER — PATIENT MESSAGE (OUTPATIENT)
Dept: UROLOGY | Facility: CLINIC | Age: 70
End: 2020-10-01

## 2020-10-14 ENCOUNTER — PATIENT OUTREACH (OUTPATIENT)
Dept: ADMINISTRATIVE | Facility: OTHER | Age: 70
End: 2020-10-14

## 2020-10-15 ENCOUNTER — OFFICE VISIT (OUTPATIENT)
Dept: UROLOGY | Facility: CLINIC | Age: 70
End: 2020-10-15
Payer: MEDICARE

## 2020-10-15 VITALS
HEIGHT: 67 IN | HEART RATE: 64 BPM | WEIGHT: 180 LBS | SYSTOLIC BLOOD PRESSURE: 110 MMHG | DIASTOLIC BLOOD PRESSURE: 69 MMHG | BODY MASS INDEX: 28.25 KG/M2

## 2020-10-15 DIAGNOSIS — Z90.79 HISTORY OF ROBOT-ASSISTED LAPAROSCOPIC RADICAL PROSTATECTOMY: ICD-10-CM

## 2020-10-15 DIAGNOSIS — N52.31 ERECTILE DYSFUNCTION FOLLOWING RADICAL PROSTATECTOMY: ICD-10-CM

## 2020-10-15 DIAGNOSIS — C61 PROSTATE CANCER: Primary | ICD-10-CM

## 2020-10-15 PROCEDURE — 3078F DIAST BP <80 MM HG: CPT | Mod: S$GLB,,, | Performed by: NURSE PRACTITIONER

## 2020-10-15 PROCEDURE — 3008F PR BODY MASS INDEX (BMI) DOCUMENTED: ICD-10-PCS | Mod: S$GLB,,, | Performed by: NURSE PRACTITIONER

## 2020-10-15 PROCEDURE — 1126F PR PAIN SEVERITY QUANTIFIED, NO PAIN PRESENT: ICD-10-PCS | Mod: S$GLB,,, | Performed by: NURSE PRACTITIONER

## 2020-10-15 PROCEDURE — 99999 PR PBB SHADOW E&M-EST. PATIENT-LVL IV: CPT | Mod: PBBFAC,,, | Performed by: NURSE PRACTITIONER

## 2020-10-15 PROCEDURE — 1159F MED LIST DOCD IN RCRD: CPT | Mod: S$GLB,,, | Performed by: NURSE PRACTITIONER

## 2020-10-15 PROCEDURE — 99999 PR PBB SHADOW E&M-EST. PATIENT-LVL IV: ICD-10-PCS | Mod: PBBFAC,,, | Performed by: NURSE PRACTITIONER

## 2020-10-15 PROCEDURE — 99214 PR OFFICE/OUTPT VISIT, EST, LEVL IV, 30-39 MIN: ICD-10-PCS | Mod: S$GLB,,, | Performed by: NURSE PRACTITIONER

## 2020-10-15 PROCEDURE — 3074F PR MOST RECENT SYSTOLIC BLOOD PRESSURE < 130 MM HG: ICD-10-PCS | Mod: S$GLB,,, | Performed by: NURSE PRACTITIONER

## 2020-10-15 PROCEDURE — 1101F PR PT FALLS ASSESS DOC 0-1 FALLS W/OUT INJ PAST YR: ICD-10-PCS | Mod: S$GLB,,, | Performed by: NURSE PRACTITIONER

## 2020-10-15 PROCEDURE — 99214 OFFICE O/P EST MOD 30 MIN: CPT | Mod: S$GLB,,, | Performed by: NURSE PRACTITIONER

## 2020-10-15 PROCEDURE — 1159F PR MEDICATION LIST DOCUMENTED IN MEDICAL RECORD: ICD-10-PCS | Mod: S$GLB,,, | Performed by: NURSE PRACTITIONER

## 2020-10-15 PROCEDURE — 3074F SYST BP LT 130 MM HG: CPT | Mod: S$GLB,,, | Performed by: NURSE PRACTITIONER

## 2020-10-15 PROCEDURE — 3008F BODY MASS INDEX DOCD: CPT | Mod: S$GLB,,, | Performed by: NURSE PRACTITIONER

## 2020-10-15 PROCEDURE — 3078F PR MOST RECENT DIASTOLIC BLOOD PRESSURE < 80 MM HG: ICD-10-PCS | Mod: S$GLB,,, | Performed by: NURSE PRACTITIONER

## 2020-10-15 PROCEDURE — 1126F AMNT PAIN NOTED NONE PRSNT: CPT | Mod: S$GLB,,, | Performed by: NURSE PRACTITIONER

## 2020-10-15 PROCEDURE — 1101F PT FALLS ASSESS-DOCD LE1/YR: CPT | Mod: S$GLB,,, | Performed by: NURSE PRACTITIONER

## 2020-10-15 NOTE — PROGRESS NOTES
CHIEF COMPLAINT:    Bay Yadav is a 69 y.o. male presents annual f/u visit.    HISTORY OF PRESENTING ILLINESS:    Bay Yadav is a 69 y.o. male with prostate cancer s/p RALP on 11/18/2019 by Dr. Clements.    Nia 6 (3+3) with (-) extension, margins, & SV;   No family history of prostate cancer.     Since then, he c/o ED.    Prior to surgery, he did not have ED.    However, he did have peyronie's disease.  He's s/p xiaflex with no improvement.    He has 90 degrees of curve to the L.    It is difficult to penetrate due to the curve.    It's been present for  > 1 year.  He was seen last with Dr. Georges 08/10/2020.    Here today for annual f/u visit.  Overall doing well.     No bone pain or weight loss.          REVIEW OF SYSTEMS:  Review of Systems   Constitutional: Negative.  Negative for chills, diaphoresis and fever.   HENT: Negative for congestion and sore throat.    Eyes: Negative.  Negative for double vision.   Respiratory: Negative.  Negative for cough, shortness of breath and wheezing.    Cardiovascular: Negative.  Negative for chest pain, palpitations and leg swelling.   Gastrointestinal: Negative.  Negative for abdominal pain, blood in stool, constipation, diarrhea, nausea and vomiting.   Genitourinary: Negative.  Negative for dysuria, flank pain and hematuria.        No urinary complaints.  Ok with ED   Musculoskeletal: Negative.  Negative for back pain, falls and neck pain.   Skin: Negative.  Negative for rash.   Neurological: Negative.  Negative for dizziness and seizures.   Endo/Heme/Allergies: Does not bruise/bleed easily.   Psychiatric/Behavioral: Negative.  Negative for depression. The patient is not nervous/anxious.          PATIENT HISTORY:    Past Medical History:   Diagnosis Date    Acid reflux     Atherosclerosis of abdominal aorta     Borderline diabetes     Cervical disc herniation     DDD (degenerative disc disease), cervical     DDD (degenerative disc disease), lumbar      Depression     Erectile dysfunction following radical prostatectomy     GERD (gastroesophageal reflux disease)     Heart murmur     Hyperlipidemia     Lumbar disc herniation     Nephrolithiasis     Peyronie's disease     Prostate cancer     Upper GI bleed        Past Surgical History:   Procedure Laterality Date    EYE SURGERY      ROBOT-ASSISTED LAPAROSCOPIC PELVIC LYMPHADENECTOMY USING DA ASHLEY XI N/A 11/18/2019    Procedure: XI ROBOTIC LYMPHADENECTOMY, PELVIC;  Surgeon: Jerardo Clements MD;  Location: [facility] OR [address] [place] [address];  Service: Urology;  Laterality: N/A;    ROBOT-ASSISTED LAPAROSCOPIC PROSTATECTOMY USING DA ASHLEY XI N/A 11/18/2019    Procedure: XI ROBOTIC PROSTATECTOMY;  Surgeon: Jerardo Clements MD;  Location: [facility] OR [address] [place] [address];  Service: Urology;  Laterality: N/A;  3hrs/ gen with regional       Family History   Problem Relation Age of Onset    Cancer Mother     Cancer Father     Cancer Brother        Social History     Socioeconomic History    Marital status:      Spouse name: Not on file    Number of children: Not on file    Years of education: Not on file    Highest education level: Not on file   Occupational History    Not on file   Social Needs    Financial resource strain: Not on file    Food insecurity     Worry: Not on file     Inability: Not on file    Transportation needs     Medical: Not on file     Non-medical: Not on file   Tobacco Use    Smoking status: Former Smoker    Smokeless tobacco: Never Used   Substance and Sexual Activity    Alcohol use: No    Drug use: No    Sexual activity: Not on file   Lifestyle    Physical activity     Days per week: Not on file     Minutes per session: Not on file    Stress: Not on file   Relationships    Social connections     Talks on phone: Not on file     Gets together: Not on file     Attends Yazdanism service: Not on file     Active member of club or organization: Not on file     Attends meetings of clubs or  organizations: Not on file     Relationship status: Not on file   Other Topics Concern    Not on file   Social History Narrative    Not on file       Allergies:  Asa [aspirin]    Medications:    Current Outpatient Medications:     acetaminophen/diphenhydramine (PERCOGESIC ORAL), Take by mouth., Disp: , Rfl:     butalbital-acetaminophen-caffeine -40 mg (FIORICET, ESGIC) -40 mg per tablet, TAKE 1 TABLET BY MOUTH EVERY 6 HOURS AS NEEDED FOR HEADACHE, Disp: 30 tablet, Rfl: 3    escitalopram oxalate (LEXAPRO) 10 MG tablet, TAKE 1 TABLET(10 MG) BY MOUTH EVERY DAY, Disp: 90 tablet, Rfl: 3    lisinopril 10 MG tablet, TAKE 1 TABLET(10 MG) BY MOUTH EVERY DAY, Disp: 90 tablet, Rfl: 3    meclizine (ANTIVERT) 25 mg tablet, TAKE 1 TABLET(25 MG) BY MOUTH THREE TIMES DAILY AS NEEDED, Disp: 30 tablet, Rfl: 1    multivitamin with minerals tablet, Take 1 tablet by mouth once daily., Disp: , Rfl:     omeprazole (PRILOSEC) 40 MG capsule, Take 1 capsule (40 mg total) by mouth once daily., Disp: 90 capsule, Rfl: 1    polyethylene glycol (GLYCOLAX) 17 gram/dose powder, Take 17 g by mouth once daily., Disp: 237 g, Rfl: 0    rosuvastatin (CRESTOR) 20 MG tablet, TAKE 1 TABLET(20 MG) BY MOUTH EVERY DAY, Disp: 90 tablet, Rfl: 3    tadalafiL (CIALIS) 20 MG Tab, Take 1/2 to 1 tablet every 3 days as needed., Disp: 30 tablet, Rfl: 12    traMADoL (ULTRAM) 50 mg tablet, TAKE 1 TABLET BY MOUTH EVERY 6 HOURS AS NEEDED FOR PAIN, Disp: 120 tablet, Rfl: 1    vitB6/mag cit,ox/potassium cit (THERALITH XR ORAL), Take by mouth., Disp: , Rfl:     PHYSICAL EXAMINATION:  Physical Exam  Vitals signs and nursing note reviewed.   Constitutional:       Appearance: Normal appearance. He is normal weight.   HENT:      Head: Normocephalic.      Right Ear: External ear normal.      Left Ear: External ear normal.      Nose: Nose normal.   Eyes:      Conjunctiva/sclera: Conjunctivae normal.   Neck:      Musculoskeletal: Normal range of motion.    Cardiovascular:      Rate and Rhythm: Normal rate.   Pulmonary:      Effort: Pulmonary effort is normal. No respiratory distress.   Abdominal:      General: There is no distension.      Tenderness: There is no abdominal tenderness. There is no right CVA tenderness, left CVA tenderness or rebound.   Genitourinary:     Penis: Normal.       Scrotum/Testes: Normal.      Comments: Prostate is surgically absent.    Musculoskeletal: Normal range of motion.   Skin:     General: Skin is warm and dry.   Neurological:      General: No focal deficit present.      Mental Status: He is alert and oriented to person, place, and time.   Psychiatric:         Mood and Affect: Mood normal.           LABS:      In office UA today was clear.      Lab Results   Component Value Date    PSADIAG <0.10 09/30/2020    PSADIAG <0.10 05/04/2020    PSADIAG <0.10 12/31/2019             IMPRESSION:    Encounter Diagnoses   Name Primary?    Prostate cancer Yes    History of robot-assisted laparoscopic radical prostatectomy     Erectile dysfunction following radical prostatectomy          Assessment:       1. Prostate cancer    2. History of robot-assisted laparoscopic radical prostatectomy    3. Erectile dysfunction following radical prostatectomy        Plan:         I spent 25 minutes with the patient of which more than half was spent in direct consultation with the patient in regards to our treatment and plan.    Education and recommendations of today's plan of care including home remedies.  We discussed the psa results  Post ralp expectations  RTC 6 months with new PSA

## 2020-10-21 DIAGNOSIS — K21.9 GASTROESOPHAGEAL REFLUX DISEASE: ICD-10-CM

## 2020-10-21 RX ORDER — OMEPRAZOLE 40 MG/1
40 CAPSULE, DELAYED RELEASE ORAL DAILY
Qty: 90 CAPSULE | Refills: 1 | Status: SHIPPED | OUTPATIENT
Start: 2020-10-21 | End: 2021-04-19 | Stop reason: SDUPTHER

## 2020-10-30 ENCOUNTER — PATIENT OUTREACH (OUTPATIENT)
Dept: ADMINISTRATIVE | Facility: HOSPITAL | Age: 70
End: 2020-10-30

## 2020-11-19 ENCOUNTER — OFFICE VISIT (OUTPATIENT)
Dept: PRIMARY CARE CLINIC | Facility: CLINIC | Age: 70
End: 2020-11-19
Payer: MEDICARE

## 2020-11-19 VITALS
SYSTOLIC BLOOD PRESSURE: 126 MMHG | RESPIRATION RATE: 18 BRPM | BODY MASS INDEX: 26.89 KG/M2 | DIASTOLIC BLOOD PRESSURE: 64 MMHG | TEMPERATURE: 98 F | HEART RATE: 92 BPM | WEIGHT: 171.31 LBS | HEIGHT: 67 IN | OXYGEN SATURATION: 97 %

## 2020-11-19 DIAGNOSIS — Z00.00 ANNUAL PHYSICAL EXAM: Primary | ICD-10-CM

## 2020-11-19 DIAGNOSIS — R73.03 BORDERLINE DIABETES: ICD-10-CM

## 2020-11-19 DIAGNOSIS — G44.219 EPISODIC TENSION-TYPE HEADACHE, NOT INTRACTABLE: ICD-10-CM

## 2020-11-19 DIAGNOSIS — M51.36 DDD (DEGENERATIVE DISC DISEASE), LUMBAR: ICD-10-CM

## 2020-11-19 PROCEDURE — 1125F PR PAIN SEVERITY QUANTIFIED, PAIN PRESENT: ICD-10-PCS | Mod: S$GLB,,, | Performed by: FAMILY MEDICINE

## 2020-11-19 PROCEDURE — 3288F PR FALLS RISK ASSESSMENT DOCUMENTED: ICD-10-PCS | Mod: S$GLB,,, | Performed by: FAMILY MEDICINE

## 2020-11-19 PROCEDURE — 3008F BODY MASS INDEX DOCD: CPT | Mod: S$GLB,,, | Performed by: FAMILY MEDICINE

## 2020-11-19 PROCEDURE — 1101F PR PT FALLS ASSESS DOC 0-1 FALLS W/OUT INJ PAST YR: ICD-10-PCS | Mod: S$GLB,,, | Performed by: FAMILY MEDICINE

## 2020-11-19 PROCEDURE — 1101F PT FALLS ASSESS-DOCD LE1/YR: CPT | Mod: S$GLB,,, | Performed by: FAMILY MEDICINE

## 2020-11-19 PROCEDURE — 3008F PR BODY MASS INDEX (BMI) DOCUMENTED: ICD-10-PCS | Mod: S$GLB,,, | Performed by: FAMILY MEDICINE

## 2020-11-19 PROCEDURE — 99397 PER PM REEVAL EST PAT 65+ YR: CPT | Mod: S$GLB,,, | Performed by: FAMILY MEDICINE

## 2020-11-19 PROCEDURE — 3074F SYST BP LT 130 MM HG: CPT | Mod: S$GLB,,, | Performed by: FAMILY MEDICINE

## 2020-11-19 PROCEDURE — 99999 PR PBB SHADOW E&M-EST. PATIENT-LVL IV: ICD-10-PCS | Mod: PBBFAC,,, | Performed by: FAMILY MEDICINE

## 2020-11-19 PROCEDURE — 1125F AMNT PAIN NOTED PAIN PRSNT: CPT | Mod: S$GLB,,, | Performed by: FAMILY MEDICINE

## 2020-11-19 PROCEDURE — 3078F PR MOST RECENT DIASTOLIC BLOOD PRESSURE < 80 MM HG: ICD-10-PCS | Mod: S$GLB,,, | Performed by: FAMILY MEDICINE

## 2020-11-19 PROCEDURE — 3078F DIAST BP <80 MM HG: CPT | Mod: S$GLB,,, | Performed by: FAMILY MEDICINE

## 2020-11-19 PROCEDURE — 99397 PR PREVENTIVE VISIT,EST,65 & OVER: ICD-10-PCS | Mod: S$GLB,,, | Performed by: FAMILY MEDICINE

## 2020-11-19 PROCEDURE — 99999 PR PBB SHADOW E&M-EST. PATIENT-LVL IV: CPT | Mod: PBBFAC,,, | Performed by: FAMILY MEDICINE

## 2020-11-19 PROCEDURE — 3074F PR MOST RECENT SYSTOLIC BLOOD PRESSURE < 130 MM HG: ICD-10-PCS | Mod: S$GLB,,, | Performed by: FAMILY MEDICINE

## 2020-11-19 PROCEDURE — 3288F FALL RISK ASSESSMENT DOCD: CPT | Mod: S$GLB,,, | Performed by: FAMILY MEDICINE

## 2020-11-19 RX ORDER — BUTALBITAL, ACETAMINOPHEN AND CAFFEINE 50; 325; 40 MG/1; MG/1; MG/1
1 TABLET ORAL EVERY 6 HOURS PRN
Qty: 30 TABLET | Refills: 3 | Status: SHIPPED | OUTPATIENT
Start: 2020-11-19 | End: 2021-04-19 | Stop reason: SDUPTHER

## 2020-11-19 RX ORDER — PROMETHAZINE HYDROCHLORIDE 25 MG/1
25 TABLET ORAL EVERY 6 HOURS PRN
Qty: 30 TABLET | Refills: 2 | Status: SHIPPED | OUTPATIENT
Start: 2020-11-19 | End: 2022-04-20

## 2020-11-19 RX ORDER — TRAMADOL HYDROCHLORIDE 50 MG/1
50 TABLET ORAL EVERY 6 HOURS PRN
Qty: 120 TABLET | Refills: 5 | Status: SHIPPED | OUTPATIENT
Start: 2020-11-19 | End: 2021-05-14 | Stop reason: SDUPTHER

## 2020-11-19 NOTE — PROGRESS NOTES
"Subjective:       Patient ID: Bay Yadav is a 69 y.o. male.    Chief Complaint: Annual Exam (pt states brother just passed away friday , requesting medication for nausea)    Here for annual exam. Blood glucose a little elevated on recent labs, admits that he has been eating a lot of donuts and sweets, and still drinking 4+ cans of root beer daily  Chronic neck and back pain, stable on current regimen    Review of Systems   Constitutional: Negative for chills, fatigue and fever.   HENT: Negative for congestion.    Eyes: Negative for visual disturbance.   Respiratory: Negative for cough and shortness of breath.    Cardiovascular: Negative for chest pain.   Gastrointestinal: Positive for nausea. Negative for abdominal pain and blood in stool.   Genitourinary: Negative for difficulty urinating.   Musculoskeletal: Positive for arthralgias, back pain and neck pain.   Skin: Negative for rash.   Neurological: Negative for dizziness.   Psychiatric/Behavioral: Negative for sleep disturbance.       Objective:      Vitals:    11/19/20 1115   BP: 126/64   BP Location: Left arm   Patient Position: Sitting   BP Method: Medium (Manual)   Pulse: 92   Resp: 18   Temp: 98.3 °F (36.8 °C)   TempSrc: Oral   SpO2: 97%   Weight: 77.7 kg (171 lb 4.8 oz)   Height: 5' 7" (1.702 m)     Physical Exam  Vitals signs and nursing note reviewed.   Constitutional:       Appearance: He is well-developed.   HENT:      Head: Normocephalic and atraumatic.   Eyes:      Pupils: Pupils are equal, round, and reactive to light.   Neck:      Musculoskeletal: Neck supple.      Vascular: No carotid bruit or JVD.   Cardiovascular:      Rate and Rhythm: Normal rate and regular rhythm.      Pulses:           Radial pulses are 2+ on the right side and 2+ on the left side.      Heart sounds: Normal heart sounds.   Pulmonary:      Effort: Pulmonary effort is normal.      Breath sounds: Normal breath sounds.   Abdominal:      General: Bowel sounds are normal. "      Palpations: Abdomen is soft.      Tenderness: There is no abdominal tenderness.   Skin:     General: Skin is warm and dry.   Neurological:      Mental Status: He is alert and oriented to person, place, and time.   Psychiatric:         Behavior: Behavior normal.         Lab Results   Component Value Date    WBC 6.60 05/19/2020    HGB 13.5 (L) 05/19/2020    HCT 40.6 05/19/2020     05/19/2020    CHOL 163 11/17/2020    TRIG 91 11/17/2020    HDL 66 11/17/2020    ALT 27 11/17/2020    AST 23 11/17/2020     11/17/2020    K 4.4 11/17/2020    CL 99 (L) 11/17/2020    CREATININE 1.3 11/17/2020    BUN 19 11/17/2020    CO2 28 11/17/2020    INR 1.0 07/21/2006    HGBA1C 5.4 11/13/2019      Assessment:       1. Annual physical exam    2. Episodic tension-type headache, not intractable    3. DDD (degenerative disc disease), lumbar    4. Borderline diabetes        Plan:       Annual physical exam  Refuses all vaccinations  Episodic tension-type headache, not intractable  -     butalbital-acetaminophen-caffeine -40 mg (FIORICET, ESGIC) -40 mg per tablet; Take 1 tablet by mouth every 6 (six) hours as needed.  Dispense: 30 tablet; Refill: 3    DDD (degenerative disc disease), lumbar  -     traMADoL (ULTRAM) 50 mg tablet; Take 1 tablet (50 mg total) by mouth every 6 (six) hours as needed.  Dispense: 120 tablet; Refill: 5  Stable on current regimen  Borderline diabetes  -     Comprehensive Metabolic Panel; Future; Expected date: 05/19/2021  -     Hemoglobin A1C; Future; Expected date: 05/19/2021  Stressed importance of reducing carb intake  Other orders  -     promethazine (PHENERGAN) 25 MG tablet; Take 1 tablet (25 mg total) by mouth every 6 (six) hours as needed for Nausea.  Dispense: 30 tablet; Refill: 2      Medication List with Changes/Refills   New Medications    PROMETHAZINE (PHENERGAN) 25 MG TABLET    Take 1 tablet (25 mg total) by mouth every 6 (six) hours as needed for Nausea.   Current Medications     ESCITALOPRAM OXALATE (LEXAPRO) 10 MG TABLET    TAKE 1 TABLET(10 MG) BY MOUTH EVERY DAY    LISINOPRIL 10 MG TABLET    TAKE 1 TABLET(10 MG) BY MOUTH EVERY DAY    MECLIZINE (ANTIVERT) 25 MG TABLET    TAKE 1 TABLET(25 MG) BY MOUTH THREE TIMES DAILY AS NEEDED    MULTIVITAMIN WITH MINERALS TABLET    Take 1 tablet by mouth once daily.    OMEPRAZOLE (PRILOSEC) 40 MG CAPSULE    Take 1 capsule (40 mg total) by mouth once daily.    POLYETHYLENE GLYCOL (GLYCOLAX) 17 GRAM/DOSE POWDER    Take 17 g by mouth once daily.    ROSUVASTATIN (CRESTOR) 20 MG TABLET    TAKE 1 TABLET(20 MG) BY MOUTH EVERY DAY    TADALAFIL (CIALIS) 20 MG TAB    Take 1/2 to 1 tablet every 3 days as needed.    VITB6/MAG CIT,OX/POTASSIUM CIT (THERALITH XR ORAL)    Take by mouth.   Changed and/or Refilled Medications    Modified Medication Previous Medication    BUTALBITAL-ACETAMINOPHEN-CAFFEINE -40 MG (FIORICET, ESGIC) -40 MG PER TABLET butalbital-acetaminophen-caffeine -40 mg (FIORICET, ESGIC) -40 mg per tablet       Take 1 tablet by mouth every 6 (six) hours as needed.    TAKE 1 TABLET BY MOUTH EVERY 6 HOURS AS NEEDED FOR HEADACHE    TRAMADOL (ULTRAM) 50 MG TABLET traMADoL (ULTRAM) 50 mg tablet       Take 1 tablet (50 mg total) by mouth every 6 (six) hours as needed.    TAKE 1 TABLET BY MOUTH EVERY 6 HOURS AS NEEDED FOR PAIN   Discontinued Medications    ACETAMINOPHEN/DIPHENHYDRAMINE (PERCOGESIC ORAL)    Take by mouth.

## 2020-11-25 ENCOUNTER — OFFICE VISIT (OUTPATIENT)
Dept: PRIMARY CARE CLINIC | Facility: CLINIC | Age: 70
End: 2020-11-25
Payer: MEDICARE

## 2020-11-25 VITALS
RESPIRATION RATE: 16 BRPM | HEART RATE: 70 BPM | BODY MASS INDEX: 26.91 KG/M2 | DIASTOLIC BLOOD PRESSURE: 68 MMHG | TEMPERATURE: 98 F | WEIGHT: 171.44 LBS | OXYGEN SATURATION: 95 % | HEIGHT: 67 IN | SYSTOLIC BLOOD PRESSURE: 126 MMHG

## 2020-11-25 DIAGNOSIS — M19.011 ARTHRITIS OF BOTH GLENOHUMERAL JOINTS: Primary | ICD-10-CM

## 2020-11-25 DIAGNOSIS — M19.012 ARTHRITIS OF BOTH GLENOHUMERAL JOINTS: Primary | ICD-10-CM

## 2020-11-25 PROCEDURE — 20610 LARGE JOINT ASPIRATION/INJECTION: BILATERAL GLENOHUMERAL: ICD-10-PCS | Mod: 50,S$GLB,, | Performed by: FAMILY MEDICINE

## 2020-11-25 PROCEDURE — 1101F PT FALLS ASSESS-DOCD LE1/YR: CPT | Mod: S$GLB,,, | Performed by: FAMILY MEDICINE

## 2020-11-25 PROCEDURE — 1159F MED LIST DOCD IN RCRD: CPT | Mod: S$GLB,,, | Performed by: FAMILY MEDICINE

## 2020-11-25 PROCEDURE — 3074F PR MOST RECENT SYSTOLIC BLOOD PRESSURE < 130 MM HG: ICD-10-PCS | Mod: S$GLB,,, | Performed by: FAMILY MEDICINE

## 2020-11-25 PROCEDURE — 3288F FALL RISK ASSESSMENT DOCD: CPT | Mod: S$GLB,,, | Performed by: FAMILY MEDICINE

## 2020-11-25 PROCEDURE — 1101F PR PT FALLS ASSESS DOC 0-1 FALLS W/OUT INJ PAST YR: ICD-10-PCS | Mod: S$GLB,,, | Performed by: FAMILY MEDICINE

## 2020-11-25 PROCEDURE — 99999 PR PBB SHADOW E&M-EST. PATIENT-LVL III: ICD-10-PCS | Mod: PBBFAC,,, | Performed by: FAMILY MEDICINE

## 2020-11-25 PROCEDURE — 1159F PR MEDICATION LIST DOCUMENTED IN MEDICAL RECORD: ICD-10-PCS | Mod: S$GLB,,, | Performed by: FAMILY MEDICINE

## 2020-11-25 PROCEDURE — 3288F PR FALLS RISK ASSESSMENT DOCUMENTED: ICD-10-PCS | Mod: S$GLB,,, | Performed by: FAMILY MEDICINE

## 2020-11-25 PROCEDURE — 99213 PR OFFICE/OUTPT VISIT, EST, LEVL III, 20-29 MIN: ICD-10-PCS | Mod: 25,S$GLB,, | Performed by: FAMILY MEDICINE

## 2020-11-25 PROCEDURE — 1126F PR PAIN SEVERITY QUANTIFIED, NO PAIN PRESENT: ICD-10-PCS | Mod: S$GLB,,, | Performed by: FAMILY MEDICINE

## 2020-11-25 PROCEDURE — 3078F PR MOST RECENT DIASTOLIC BLOOD PRESSURE < 80 MM HG: ICD-10-PCS | Mod: S$GLB,,, | Performed by: FAMILY MEDICINE

## 2020-11-25 PROCEDURE — 20610 DRAIN/INJ JOINT/BURSA W/O US: CPT | Mod: 50,S$GLB,, | Performed by: FAMILY MEDICINE

## 2020-11-25 PROCEDURE — 99999 PR PBB SHADOW E&M-EST. PATIENT-LVL III: CPT | Mod: PBBFAC,,, | Performed by: FAMILY MEDICINE

## 2020-11-25 PROCEDURE — 99213 OFFICE O/P EST LOW 20 MIN: CPT | Mod: 25,S$GLB,, | Performed by: FAMILY MEDICINE

## 2020-11-25 PROCEDURE — 3008F BODY MASS INDEX DOCD: CPT | Mod: S$GLB,,, | Performed by: FAMILY MEDICINE

## 2020-11-25 PROCEDURE — 3078F DIAST BP <80 MM HG: CPT | Mod: S$GLB,,, | Performed by: FAMILY MEDICINE

## 2020-11-25 PROCEDURE — 1126F AMNT PAIN NOTED NONE PRSNT: CPT | Mod: S$GLB,,, | Performed by: FAMILY MEDICINE

## 2020-11-25 PROCEDURE — 3008F PR BODY MASS INDEX (BMI) DOCUMENTED: ICD-10-PCS | Mod: S$GLB,,, | Performed by: FAMILY MEDICINE

## 2020-11-25 PROCEDURE — 3074F SYST BP LT 130 MM HG: CPT | Mod: S$GLB,,, | Performed by: FAMILY MEDICINE

## 2020-11-25 RX ORDER — METHYLPREDNISOLONE ACETATE 40 MG/ML
40 INJECTION, SUSPENSION INTRA-ARTICULAR; INTRALESIONAL; INTRAMUSCULAR; SOFT TISSUE
Status: DISCONTINUED | OUTPATIENT
Start: 2020-11-25 | End: 2020-11-25 | Stop reason: HOSPADM

## 2020-11-25 RX ADMIN — METHYLPREDNISOLONE ACETATE 40 MG: 40 INJECTION, SUSPENSION INTRA-ARTICULAR; INTRALESIONAL; INTRAMUSCULAR; SOFT TISSUE at 09:11

## 2020-11-25 NOTE — PROGRESS NOTES
"Subjective:       Patient ID: Bay Yadav is a 69 y.o. male.    Chief Complaint: Shoulder Pain    Complains of worsening bilateral shoulder pain, now worse on the right.  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  No numbness or weakness.  Has responded well to intra-articular corticosteroid injections in the past, requesting injections    Review of Systems   Constitutional: Negative for chills and fever.   Respiratory: Negative for shortness of breath.    Cardiovascular: Negative for chest pain.   Musculoskeletal: Positive for arthralgias.   Neurological: Negative for weakness and numbness.   Hematological: Does not bruise/bleed easily.       Objective:      Vitals:    11/25/20 0936   BP: 126/68   BP Location: Left arm   Patient Position: Sitting   BP Method: Medium (Manual)   Pulse: 70   Resp: 16   Temp: 97.5 °F (36.4 °C)   TempSrc: Temporal   SpO2: 95%   Weight: 77.7 kg (171 lb 6.5 oz)   Height: 5' 7" (1.702 m)     Physical Exam  Vitals signs and nursing note reviewed.   Constitutional:       General: He is not in acute distress.     Appearance: Normal appearance.   Musculoskeletal:      Right shoulder: He exhibits decreased range of motion. He exhibits no tenderness, no bony tenderness, no effusion, no deformity and normal strength.      Left shoulder: He exhibits decreased range of motion. He exhibits no bony tenderness, no swelling, no effusion and normal strength.   Skin:     General: Skin is warm and dry.   Neurological:      Mental Status: He is alert.   Psychiatric:         Mood and Affect: Mood normal.         Behavior: Behavior normal.         Lab Results   Component Value Date    WBC 6.60 05/19/2020    HGB 13.5 (L) 05/19/2020    HCT 40.6 05/19/2020     05/19/2020    CHOL 163 11/17/2020    TRIG 91 11/17/2020    HDL 66 11/17/2020    ALT 27 11/17/2020    AST 23 11/17/2020     11/17/2020    K " 4.4 11/17/2020    CL 99 (L) 11/17/2020    CREATININE 1.3 11/17/2020    BUN 19 11/17/2020    CO2 28 11/17/2020    INR 1.0 07/21/2006    HGBA1C 5.4 11/13/2019      Assessment:       1. Arthritis of both glenohumeral joints        Plan:       Arthritis of both glenohumeral joints  -     Large Joint Aspiration/Injection: bilateral glenohumeral  -     methylPREDNISolone acetate injection 40 mg  -     methylPREDNISolone acetate injection 40 mg      Medication List with Changes/Refills   Current Medications    BUTALBITAL-ACETAMINOPHEN-CAFFEINE -40 MG (FIORICET, ESGIC) -40 MG PER TABLET    Take 1 tablet by mouth every 6 (six) hours as needed.    ESCITALOPRAM OXALATE (LEXAPRO) 10 MG TABLET    TAKE 1 TABLET(10 MG) BY MOUTH EVERY DAY    LISINOPRIL 10 MG TABLET    TAKE 1 TABLET(10 MG) BY MOUTH EVERY DAY    MECLIZINE (ANTIVERT) 25 MG TABLET    TAKE 1 TABLET(25 MG) BY MOUTH THREE TIMES DAILY AS NEEDED    MULTIVITAMIN WITH MINERALS TABLET    Take 1 tablet by mouth once daily.    OMEPRAZOLE (PRILOSEC) 40 MG CAPSULE    Take 1 capsule (40 mg total) by mouth once daily.    PROMETHAZINE (PHENERGAN) 25 MG TABLET    Take 1 tablet (25 mg total) by mouth every 6 (six) hours as needed for Nausea.    ROSUVASTATIN (CRESTOR) 20 MG TABLET    TAKE 1 TABLET(20 MG) BY MOUTH EVERY DAY    TADALAFIL (CIALIS) 20 MG TAB    Take 1/2 to 1 tablet every 3 days as needed.    TRAMADOL (ULTRAM) 50 MG TABLET    Take 1 tablet (50 mg total) by mouth every 6 (six) hours as needed.    VITB6/MAG CIT,OX/POTASSIUM CIT (THERALITH XR ORAL)    Take by mouth.   Discontinued Medications    POLYETHYLENE GLYCOL (GLYCOLAX) 17 GRAM/DOSE POWDER    Take 17 g by mouth once daily.

## 2020-11-25 NOTE — PROCEDURES
Large Joint Aspiration/Injection: bilateral glenohumeral    Date/Time: 11/25/2020 9:45 AM  Performed by: Wayne Brennan MD  Authorized by: Wayne Brennan MD     Consent Done?:  Yes (Verbal)  Indications:  Arthritis and pain  Site marked: the procedure site was marked    Timeout: prior to procedure the correct patient, procedure, and site was verified    Prep: patient was prepped and draped in usual sterile fashion    Local anesthesia used?: No    Anesthesia:  Local infiltration  Anesthetic total (ml):  4      Details:  Needle Size:  22 G  Ultrasonic Guidance for needle placement?: No    Approach:  Posterior  Location:  Shoulder  Laterality:  Bilateral  Site:  Bilateral glenohumeral  Medications (Right):  40 mg methylPREDNISolone acetate 40 mg/mL  Medications (Left):  40 mg methylPREDNISolone acetate 40 mg/mL  Patient tolerance:  Patient tolerated the procedure well with no immediate complications

## 2021-01-22 ENCOUNTER — PATIENT OUTREACH (OUTPATIENT)
Dept: ADMINISTRATIVE | Facility: HOSPITAL | Age: 71
End: 2021-01-22

## 2021-04-19 DIAGNOSIS — G44.219 EPISODIC TENSION-TYPE HEADACHE, NOT INTRACTABLE: ICD-10-CM

## 2021-04-19 DIAGNOSIS — K21.9 GASTROESOPHAGEAL REFLUX DISEASE: ICD-10-CM

## 2021-04-19 RX ORDER — OMEPRAZOLE 40 MG/1
40 CAPSULE, DELAYED RELEASE ORAL DAILY
Qty: 90 CAPSULE | Refills: 1 | Status: SHIPPED | OUTPATIENT
Start: 2021-04-19 | End: 2021-10-13

## 2021-04-19 RX ORDER — BUTALBITAL, ACETAMINOPHEN AND CAFFEINE 50; 325; 40 MG/1; MG/1; MG/1
1 TABLET ORAL EVERY 6 HOURS PRN
Qty: 30 TABLET | Refills: 3 | Status: SHIPPED | OUTPATIENT
Start: 2021-04-19 | End: 2021-08-11

## 2021-05-11 ENCOUNTER — PATIENT MESSAGE (OUTPATIENT)
Dept: UROLOGY | Facility: CLINIC | Age: 71
End: 2021-05-11

## 2021-05-11 DIAGNOSIS — Z90.79 HISTORY OF ROBOT-ASSISTED LAPAROSCOPIC RADICAL PROSTATECTOMY: ICD-10-CM

## 2021-05-11 DIAGNOSIS — C61 PROSTATE CANCER: Primary | ICD-10-CM

## 2021-05-14 DIAGNOSIS — M51.36 DDD (DEGENERATIVE DISC DISEASE), LUMBAR: ICD-10-CM

## 2021-05-14 RX ORDER — TRAMADOL HYDROCHLORIDE 50 MG/1
50 TABLET ORAL EVERY 6 HOURS PRN
Qty: 120 TABLET | Refills: 0 | Status: SHIPPED | OUTPATIENT
Start: 2021-05-14 | End: 2021-06-14 | Stop reason: SDUPTHER

## 2021-05-18 ENCOUNTER — TELEPHONE (OUTPATIENT)
Dept: PRIMARY CARE CLINIC | Facility: CLINIC | Age: 71
End: 2021-05-18

## 2021-05-18 DIAGNOSIS — M25.511 RIGHT SHOULDER PAIN, UNSPECIFIED CHRONICITY: Primary | ICD-10-CM

## 2021-05-19 ENCOUNTER — OFFICE VISIT (OUTPATIENT)
Dept: PRIMARY CARE CLINIC | Facility: CLINIC | Age: 71
End: 2021-05-19
Payer: MEDICARE

## 2021-05-19 ENCOUNTER — TELEPHONE (OUTPATIENT)
Dept: PRIMARY CARE CLINIC | Facility: CLINIC | Age: 71
End: 2021-05-19

## 2021-05-19 VITALS
HEIGHT: 67 IN | DIASTOLIC BLOOD PRESSURE: 82 MMHG | OXYGEN SATURATION: 100 % | BODY MASS INDEX: 28.29 KG/M2 | HEART RATE: 51 BPM | SYSTOLIC BLOOD PRESSURE: 138 MMHG | WEIGHT: 180.25 LBS | RESPIRATION RATE: 18 BRPM

## 2021-05-19 DIAGNOSIS — C61 PROSTATE CANCER: ICD-10-CM

## 2021-05-19 DIAGNOSIS — I70.0 ATHEROSCLEROSIS OF ABDOMINAL AORTA: ICD-10-CM

## 2021-05-19 DIAGNOSIS — M67.921 TENDINOPATHY OF RIGHT BICEPS TENDON: ICD-10-CM

## 2021-05-19 DIAGNOSIS — N18.31 STAGE 3A CHRONIC KIDNEY DISEASE: ICD-10-CM

## 2021-05-19 DIAGNOSIS — I10 ESSENTIAL HYPERTENSION, BENIGN: Primary | ICD-10-CM

## 2021-05-19 DIAGNOSIS — E87.5 HYPERKALEMIA: ICD-10-CM

## 2021-05-19 PROCEDURE — 3008F BODY MASS INDEX DOCD: CPT | Mod: S$GLB,,, | Performed by: FAMILY MEDICINE

## 2021-05-19 PROCEDURE — 99214 OFFICE O/P EST MOD 30 MIN: CPT | Mod: S$GLB,,, | Performed by: FAMILY MEDICINE

## 2021-05-19 PROCEDURE — 1159F PR MEDICATION LIST DOCUMENTED IN MEDICAL RECORD: ICD-10-PCS | Mod: S$GLB,,, | Performed by: FAMILY MEDICINE

## 2021-05-19 PROCEDURE — 99999 PR PBB SHADOW E&M-EST. PATIENT-LVL IV: ICD-10-PCS | Mod: PBBFAC,,, | Performed by: FAMILY MEDICINE

## 2021-05-19 PROCEDURE — 1101F PR PT FALLS ASSESS DOC 0-1 FALLS W/OUT INJ PAST YR: ICD-10-PCS | Mod: S$GLB,,, | Performed by: FAMILY MEDICINE

## 2021-05-19 PROCEDURE — 1159F MED LIST DOCD IN RCRD: CPT | Mod: S$GLB,,, | Performed by: FAMILY MEDICINE

## 2021-05-19 PROCEDURE — 3288F PR FALLS RISK ASSESSMENT DOCUMENTED: ICD-10-PCS | Mod: S$GLB,,, | Performed by: FAMILY MEDICINE

## 2021-05-19 PROCEDURE — 99214 PR OFFICE/OUTPT VISIT, EST, LEVL IV, 30-39 MIN: ICD-10-PCS | Mod: S$GLB,,, | Performed by: FAMILY MEDICINE

## 2021-05-19 PROCEDURE — 1125F PR PAIN SEVERITY QUANTIFIED, PAIN PRESENT: ICD-10-PCS | Mod: S$GLB,,, | Performed by: FAMILY MEDICINE

## 2021-05-19 PROCEDURE — 3288F FALL RISK ASSESSMENT DOCD: CPT | Mod: S$GLB,,, | Performed by: FAMILY MEDICINE

## 2021-05-19 PROCEDURE — 1125F AMNT PAIN NOTED PAIN PRSNT: CPT | Mod: S$GLB,,, | Performed by: FAMILY MEDICINE

## 2021-05-19 PROCEDURE — 1101F PT FALLS ASSESS-DOCD LE1/YR: CPT | Mod: S$GLB,,, | Performed by: FAMILY MEDICINE

## 2021-05-19 PROCEDURE — 99999 PR PBB SHADOW E&M-EST. PATIENT-LVL IV: CPT | Mod: PBBFAC,,, | Performed by: FAMILY MEDICINE

## 2021-05-19 PROCEDURE — 3008F PR BODY MASS INDEX (BMI) DOCUMENTED: ICD-10-PCS | Mod: S$GLB,,, | Performed by: FAMILY MEDICINE

## 2021-05-24 ENCOUNTER — TELEPHONE (OUTPATIENT)
Dept: PRIMARY CARE CLINIC | Facility: CLINIC | Age: 71
End: 2021-05-24

## 2021-05-24 DIAGNOSIS — R93.89 ABNORMAL CHEST X-RAY: ICD-10-CM

## 2021-05-24 DIAGNOSIS — S20.212A RIB CONTUSION, LEFT, INITIAL ENCOUNTER: Primary | ICD-10-CM

## 2021-05-25 ENCOUNTER — TELEPHONE (OUTPATIENT)
Dept: PRIMARY CARE CLINIC | Facility: CLINIC | Age: 71
End: 2021-05-25

## 2021-05-25 DIAGNOSIS — M25.551 RIGHT HIP PAIN: Primary | ICD-10-CM

## 2021-06-14 DIAGNOSIS — M51.36 DDD (DEGENERATIVE DISC DISEASE), LUMBAR: ICD-10-CM

## 2021-06-14 RX ORDER — TRAMADOL HYDROCHLORIDE 50 MG/1
50 TABLET ORAL EVERY 6 HOURS PRN
Qty: 120 TABLET | Refills: 3 | Status: SHIPPED | OUTPATIENT
Start: 2021-06-14 | End: 2021-10-12 | Stop reason: SDUPTHER

## 2021-06-18 ENCOUNTER — OFFICE VISIT (OUTPATIENT)
Dept: UROLOGY | Facility: CLINIC | Age: 71
End: 2021-06-18
Payer: MEDICARE

## 2021-06-18 VITALS
WEIGHT: 178 LBS | DIASTOLIC BLOOD PRESSURE: 79 MMHG | HEART RATE: 79 BPM | BODY MASS INDEX: 27.94 KG/M2 | HEIGHT: 67 IN | SYSTOLIC BLOOD PRESSURE: 146 MMHG

## 2021-06-18 DIAGNOSIS — N48.89 CHRONIC PAIN IN PENIS: ICD-10-CM

## 2021-06-18 DIAGNOSIS — C61 PROSTATE CANCER: ICD-10-CM

## 2021-06-18 DIAGNOSIS — N52.31 ERECTILE DYSFUNCTION FOLLOWING RADICAL PROSTATECTOMY: ICD-10-CM

## 2021-06-18 DIAGNOSIS — G89.29 CHRONIC MALE PELVIC PAIN: Primary | ICD-10-CM

## 2021-06-18 DIAGNOSIS — G89.29 CHRONIC PAIN IN PENIS: ICD-10-CM

## 2021-06-18 DIAGNOSIS — N48.6 PEYRONIE DISEASE: ICD-10-CM

## 2021-06-18 DIAGNOSIS — Z90.79 HISTORY OF ROBOT-ASSISTED LAPAROSCOPIC RADICAL PROSTATECTOMY: ICD-10-CM

## 2021-06-18 DIAGNOSIS — R10.2 CHRONIC MALE PELVIC PAIN: Primary | ICD-10-CM

## 2021-06-18 LAB
BILIRUB SERPL-MCNC: NORMAL MG/DL
BLOOD URINE, POC: NORMAL
CLARITY, POC UA: CLEAR
COLOR, POC UA: YELLOW
GLUCOSE UR QL STRIP: NORMAL
KETONES UR QL STRIP: NORMAL
LEUKOCYTE ESTERASE URINE, POC: NORMAL
NITRITE, POC UA: NORMAL
PH, POC UA: 5
PROTEIN, POC: NORMAL
SPECIFIC GRAVITY, POC UA: 1.01
UROBILINOGEN, POC UA: NORMAL

## 2021-06-18 PROCEDURE — 1159F PR MEDICATION LIST DOCUMENTED IN MEDICAL RECORD: ICD-10-PCS | Mod: S$GLB,,, | Performed by: NURSE PRACTITIONER

## 2021-06-18 PROCEDURE — 81002 URINALYSIS NONAUTO W/O SCOPE: CPT | Mod: S$GLB,,, | Performed by: NURSE PRACTITIONER

## 2021-06-18 PROCEDURE — 1101F PT FALLS ASSESS-DOCD LE1/YR: CPT | Mod: S$GLB,,, | Performed by: NURSE PRACTITIONER

## 2021-06-18 PROCEDURE — 3008F PR BODY MASS INDEX (BMI) DOCUMENTED: ICD-10-PCS | Mod: S$GLB,,, | Performed by: NURSE PRACTITIONER

## 2021-06-18 PROCEDURE — 81002 POCT URINE DIPSTICK WITHOUT MICROSCOPE: ICD-10-PCS | Mod: S$GLB,,, | Performed by: NURSE PRACTITIONER

## 2021-06-18 PROCEDURE — 1125F AMNT PAIN NOTED PAIN PRSNT: CPT | Mod: S$GLB,,, | Performed by: NURSE PRACTITIONER

## 2021-06-18 PROCEDURE — 3288F FALL RISK ASSESSMENT DOCD: CPT | Mod: S$GLB,,, | Performed by: NURSE PRACTITIONER

## 2021-06-18 PROCEDURE — 3008F BODY MASS INDEX DOCD: CPT | Mod: S$GLB,,, | Performed by: NURSE PRACTITIONER

## 2021-06-18 PROCEDURE — 99214 PR OFFICE/OUTPT VISIT, EST, LEVL IV, 30-39 MIN: ICD-10-PCS | Mod: 25,S$GLB,, | Performed by: NURSE PRACTITIONER

## 2021-06-18 PROCEDURE — 1159F MED LIST DOCD IN RCRD: CPT | Mod: S$GLB,,, | Performed by: NURSE PRACTITIONER

## 2021-06-18 PROCEDURE — 3288F PR FALLS RISK ASSESSMENT DOCUMENTED: ICD-10-PCS | Mod: S$GLB,,, | Performed by: NURSE PRACTITIONER

## 2021-06-18 PROCEDURE — 99999 PR PBB SHADOW E&M-EST. PATIENT-LVL IV: CPT | Mod: PBBFAC,,, | Performed by: NURSE PRACTITIONER

## 2021-06-18 PROCEDURE — 99999 PR PBB SHADOW E&M-EST. PATIENT-LVL IV: ICD-10-PCS | Mod: PBBFAC,,, | Performed by: NURSE PRACTITIONER

## 2021-06-18 PROCEDURE — 1125F PR PAIN SEVERITY QUANTIFIED, PAIN PRESENT: ICD-10-PCS | Mod: S$GLB,,, | Performed by: NURSE PRACTITIONER

## 2021-06-18 PROCEDURE — 99214 OFFICE O/P EST MOD 30 MIN: CPT | Mod: 25,S$GLB,, | Performed by: NURSE PRACTITIONER

## 2021-06-18 PROCEDURE — 1101F PR PT FALLS ASSESS DOC 0-1 FALLS W/OUT INJ PAST YR: ICD-10-PCS | Mod: S$GLB,,, | Performed by: NURSE PRACTITIONER

## 2021-06-18 RX ORDER — TADALAFIL 20 MG/1
TABLET ORAL
Qty: 30 TABLET | Refills: 12 | Status: SHIPPED | OUTPATIENT
Start: 2021-06-18 | End: 2022-05-18

## 2021-06-20 ENCOUNTER — PATIENT MESSAGE (OUTPATIENT)
Dept: UROLOGY | Facility: CLINIC | Age: 71
End: 2021-06-20

## 2021-07-01 ENCOUNTER — TELEPHONE (OUTPATIENT)
Dept: UROLOGY | Facility: CLINIC | Age: 71
End: 2021-07-01

## 2021-07-21 ENCOUNTER — TELEPHONE (OUTPATIENT)
Dept: PRIMARY CARE CLINIC | Facility: CLINIC | Age: 71
End: 2021-07-21

## 2021-07-23 ENCOUNTER — IMMUNIZATION (OUTPATIENT)
Dept: PRIMARY CARE CLINIC | Facility: CLINIC | Age: 71
End: 2021-07-23
Payer: MEDICARE

## 2021-07-23 DIAGNOSIS — Z23 NEED FOR VACCINATION: Primary | ICD-10-CM

## 2021-07-23 PROCEDURE — 91300 COVID-19, MRNA, LNP-S, PF, 30 MCG/0.3 ML DOSE VACCINE: CPT | Mod: PBBFAC | Performed by: FAMILY MEDICINE

## 2021-08-13 ENCOUNTER — IMMUNIZATION (OUTPATIENT)
Dept: PRIMARY CARE CLINIC | Facility: CLINIC | Age: 71
End: 2021-08-13
Payer: MEDICARE

## 2021-08-13 DIAGNOSIS — Z23 NEED FOR VACCINATION: Primary | ICD-10-CM

## 2021-10-12 DIAGNOSIS — M51.36 DDD (DEGENERATIVE DISC DISEASE), LUMBAR: ICD-10-CM

## 2021-10-12 RX ORDER — TRAMADOL HYDROCHLORIDE 50 MG/1
50 TABLET ORAL EVERY 6 HOURS PRN
Qty: 120 TABLET | Refills: 3 | Status: SHIPPED | OUTPATIENT
Start: 2021-10-12 | End: 2022-02-02

## 2021-10-13 DIAGNOSIS — K21.9 GASTROESOPHAGEAL REFLUX DISEASE: ICD-10-CM

## 2021-10-13 RX ORDER — OMEPRAZOLE 40 MG/1
CAPSULE, DELAYED RELEASE ORAL
Qty: 90 CAPSULE | Refills: 3 | Status: SHIPPED | OUTPATIENT
Start: 2021-10-13 | End: 2022-10-01

## 2021-11-05 ENCOUNTER — TELEPHONE (OUTPATIENT)
Dept: ADMINISTRATIVE | Facility: HOSPITAL | Age: 71
End: 2021-11-05
Payer: MEDICARE

## 2021-11-11 ENCOUNTER — TELEPHONE (OUTPATIENT)
Dept: PRIMARY CARE CLINIC | Facility: CLINIC | Age: 71
End: 2021-11-11
Payer: MEDICARE

## 2021-11-11 DIAGNOSIS — E78.2 MIXED HYPERLIPIDEMIA: ICD-10-CM

## 2021-11-11 DIAGNOSIS — N18.31 STAGE 3A CHRONIC KIDNEY DISEASE: Primary | ICD-10-CM

## 2021-11-11 DIAGNOSIS — R73.03 BORDERLINE DIABETES: ICD-10-CM

## 2021-11-18 ENCOUNTER — OFFICE VISIT (OUTPATIENT)
Dept: PRIMARY CARE CLINIC | Facility: CLINIC | Age: 71
End: 2021-11-18
Payer: MEDICARE

## 2021-11-18 VITALS
SYSTOLIC BLOOD PRESSURE: 112 MMHG | HEIGHT: 67 IN | WEIGHT: 182.63 LBS | OXYGEN SATURATION: 96 % | HEART RATE: 94 BPM | RESPIRATION RATE: 18 BRPM | DIASTOLIC BLOOD PRESSURE: 72 MMHG | BODY MASS INDEX: 28.66 KG/M2

## 2021-11-18 DIAGNOSIS — Z23 NEED FOR VACCINATION: ICD-10-CM

## 2021-11-18 DIAGNOSIS — I10 ESSENTIAL HYPERTENSION, BENIGN: Primary | ICD-10-CM

## 2021-11-18 DIAGNOSIS — M19.012 OSTEOARTHRITIS OF GLENOHUMERAL JOINT, LEFT: ICD-10-CM

## 2021-11-18 DIAGNOSIS — E78.2 MIXED HYPERLIPIDEMIA: ICD-10-CM

## 2021-11-18 DIAGNOSIS — N18.31 STAGE 3A CHRONIC KIDNEY DISEASE: ICD-10-CM

## 2021-11-18 DIAGNOSIS — R73.03 BORDERLINE DIABETES: ICD-10-CM

## 2021-11-18 DIAGNOSIS — C61 PROSTATE CANCER: ICD-10-CM

## 2021-11-18 PROCEDURE — 3074F PR MOST RECENT SYSTOLIC BLOOD PRESSURE < 130 MM HG: ICD-10-PCS | Mod: S$GLB,,, | Performed by: FAMILY MEDICINE

## 2021-11-18 PROCEDURE — 1159F PR MEDICATION LIST DOCUMENTED IN MEDICAL RECORD: ICD-10-PCS | Mod: S$GLB,,, | Performed by: FAMILY MEDICINE

## 2021-11-18 PROCEDURE — 1125F PR PAIN SEVERITY QUANTIFIED, PAIN PRESENT: ICD-10-PCS | Mod: S$GLB,,, | Performed by: FAMILY MEDICINE

## 2021-11-18 PROCEDURE — 90670 PNEUMOCOCCAL CONJUGATE VACCINE 13-VALENT LESS THAN 5YO & GREATER THAN: ICD-10-PCS | Mod: S$GLB,,, | Performed by: FAMILY MEDICINE

## 2021-11-18 PROCEDURE — G0009 ADMIN PNEUMOCOCCAL VACCINE: HCPCS | Mod: S$GLB,,, | Performed by: FAMILY MEDICINE

## 2021-11-18 PROCEDURE — 1100F PTFALLS ASSESS-DOCD GE2>/YR: CPT | Mod: S$GLB,,, | Performed by: FAMILY MEDICINE

## 2021-11-18 PROCEDURE — 1125F AMNT PAIN NOTED PAIN PRSNT: CPT | Mod: S$GLB,,, | Performed by: FAMILY MEDICINE

## 2021-11-18 PROCEDURE — 3288F FALL RISK ASSESSMENT DOCD: CPT | Mod: S$GLB,,, | Performed by: FAMILY MEDICINE

## 2021-11-18 PROCEDURE — 20610 LARGE JOINT ASPIRATION/INJECTION: L GLENOHUMERAL: ICD-10-PCS | Mod: LT,S$GLB,, | Performed by: FAMILY MEDICINE

## 2021-11-18 PROCEDURE — 3074F SYST BP LT 130 MM HG: CPT | Mod: S$GLB,,, | Performed by: FAMILY MEDICINE

## 2021-11-18 PROCEDURE — 3288F PR FALLS RISK ASSESSMENT DOCUMENTED: ICD-10-PCS | Mod: S$GLB,,, | Performed by: FAMILY MEDICINE

## 2021-11-18 PROCEDURE — 3078F DIAST BP <80 MM HG: CPT | Mod: S$GLB,,, | Performed by: FAMILY MEDICINE

## 2021-11-18 PROCEDURE — 3044F PR MOST RECENT HEMOGLOBIN A1C LEVEL <7.0%: ICD-10-PCS | Mod: S$GLB,,, | Performed by: FAMILY MEDICINE

## 2021-11-18 PROCEDURE — 1160F PR REVIEW ALL MEDS BY PRESCRIBER/CLIN PHARMACIST DOCUMENTED: ICD-10-PCS | Mod: S$GLB,,, | Performed by: FAMILY MEDICINE

## 2021-11-18 PROCEDURE — G0009 PR ADMIN PNEUMOCOCCAL VACCINE: ICD-10-PCS | Mod: S$GLB,,, | Performed by: FAMILY MEDICINE

## 2021-11-18 PROCEDURE — 1159F MED LIST DOCD IN RCRD: CPT | Mod: S$GLB,,, | Performed by: FAMILY MEDICINE

## 2021-11-18 PROCEDURE — 90670 PCV13 VACCINE IM: CPT | Mod: S$GLB,,, | Performed by: FAMILY MEDICINE

## 2021-11-18 PROCEDURE — 3078F PR MOST RECENT DIASTOLIC BLOOD PRESSURE < 80 MM HG: ICD-10-PCS | Mod: S$GLB,,, | Performed by: FAMILY MEDICINE

## 2021-11-18 PROCEDURE — 1160F RVW MEDS BY RX/DR IN RCRD: CPT | Mod: S$GLB,,, | Performed by: FAMILY MEDICINE

## 2021-11-18 PROCEDURE — 99214 OFFICE O/P EST MOD 30 MIN: CPT | Mod: 25,S$GLB,, | Performed by: FAMILY MEDICINE

## 2021-11-18 PROCEDURE — 20610 DRAIN/INJ JOINT/BURSA W/O US: CPT | Mod: LT,S$GLB,, | Performed by: FAMILY MEDICINE

## 2021-11-18 PROCEDURE — 3008F BODY MASS INDEX DOCD: CPT | Mod: S$GLB,,, | Performed by: FAMILY MEDICINE

## 2021-11-18 PROCEDURE — 3044F HG A1C LEVEL LT 7.0%: CPT | Mod: S$GLB,,, | Performed by: FAMILY MEDICINE

## 2021-11-18 PROCEDURE — 4010F ACE/ARB THERAPY RXD/TAKEN: CPT | Mod: S$GLB,,, | Performed by: FAMILY MEDICINE

## 2021-11-18 PROCEDURE — 4010F PR ACE/ARB THEARPY RXD/TAKEN: ICD-10-PCS | Mod: S$GLB,,, | Performed by: FAMILY MEDICINE

## 2021-11-18 PROCEDURE — 3008F PR BODY MASS INDEX (BMI) DOCUMENTED: ICD-10-PCS | Mod: S$GLB,,, | Performed by: FAMILY MEDICINE

## 2021-11-18 PROCEDURE — 99999 PR PBB SHADOW E&M-EST. PATIENT-LVL IV: ICD-10-PCS | Mod: PBBFAC,,, | Performed by: FAMILY MEDICINE

## 2021-11-18 PROCEDURE — 1100F PR PT FALLS ASSESS DOC 2+ FALLS/FALL W/INJURY/YR: ICD-10-PCS | Mod: S$GLB,,, | Performed by: FAMILY MEDICINE

## 2021-11-18 PROCEDURE — 99214 PR OFFICE/OUTPT VISIT, EST, LEVL IV, 30-39 MIN: ICD-10-PCS | Mod: 25,S$GLB,, | Performed by: FAMILY MEDICINE

## 2021-11-18 PROCEDURE — 99999 PR PBB SHADOW E&M-EST. PATIENT-LVL IV: CPT | Mod: PBBFAC,,, | Performed by: FAMILY MEDICINE

## 2021-11-18 RX ORDER — LIDOCAINE HYDROCHLORIDE 10 MG/ML
2 INJECTION INFILTRATION; PERINEURAL
Status: DISCONTINUED | OUTPATIENT
Start: 2021-11-18 | End: 2021-11-18 | Stop reason: HOSPADM

## 2021-11-18 RX ORDER — METHYLPREDNISOLONE ACETATE 40 MG/ML
40 INJECTION, SUSPENSION INTRA-ARTICULAR; INTRALESIONAL; INTRAMUSCULAR; SOFT TISSUE
Status: DISCONTINUED | OUTPATIENT
Start: 2021-11-18 | End: 2021-11-18 | Stop reason: HOSPADM

## 2021-11-18 RX ADMIN — LIDOCAINE HYDROCHLORIDE 2 ML: 10 INJECTION INFILTRATION; PERINEURAL at 10:11

## 2021-11-18 RX ADMIN — METHYLPREDNISOLONE ACETATE 40 MG: 40 INJECTION, SUSPENSION INTRA-ARTICULAR; INTRALESIONAL; INTRAMUSCULAR; SOFT TISSUE at 10:11

## 2021-12-07 DIAGNOSIS — G44.219 EPISODIC TENSION-TYPE HEADACHE, NOT INTRACTABLE: ICD-10-CM

## 2021-12-07 RX ORDER — BUTALBITAL, ACETAMINOPHEN AND CAFFEINE 50; 325; 40 MG/1; MG/1; MG/1
TABLET ORAL
Qty: 30 TABLET | Refills: 3 | Status: SHIPPED | OUTPATIENT
Start: 2021-12-07 | End: 2022-04-07 | Stop reason: SDUPTHER

## 2021-12-21 ENCOUNTER — OFFICE VISIT (OUTPATIENT)
Dept: UROLOGY | Facility: CLINIC | Age: 71
End: 2021-12-21
Payer: MEDICARE

## 2021-12-21 VITALS
WEIGHT: 180.75 LBS | DIASTOLIC BLOOD PRESSURE: 78 MMHG | HEIGHT: 67 IN | HEART RATE: 65 BPM | SYSTOLIC BLOOD PRESSURE: 133 MMHG | BODY MASS INDEX: 28.37 KG/M2

## 2021-12-21 DIAGNOSIS — N52.31 ERECTILE DYSFUNCTION FOLLOWING RADICAL PROSTATECTOMY: ICD-10-CM

## 2021-12-21 DIAGNOSIS — G89.29 CHRONIC PELVIC PAIN IN MALE: ICD-10-CM

## 2021-12-21 DIAGNOSIS — N48.89 PENIS PAIN: ICD-10-CM

## 2021-12-21 DIAGNOSIS — C61 PROSTATE CANCER: Primary | ICD-10-CM

## 2021-12-21 DIAGNOSIS — Z90.79 HISTORY OF ROBOT-ASSISTED LAPAROSCOPIC RADICAL PROSTATECTOMY: ICD-10-CM

## 2021-12-21 DIAGNOSIS — R10.2 CHRONIC PELVIC PAIN IN MALE: ICD-10-CM

## 2021-12-21 PROCEDURE — 99999 PR PBB SHADOW E&M-EST. PATIENT-LVL IV: ICD-10-PCS | Mod: PBBFAC,,, | Performed by: NURSE PRACTITIONER

## 2021-12-21 PROCEDURE — 4010F ACE/ARB THERAPY RXD/TAKEN: CPT | Mod: CPTII,S$GLB,, | Performed by: NURSE PRACTITIONER

## 2021-12-21 PROCEDURE — 99214 OFFICE O/P EST MOD 30 MIN: CPT | Mod: S$GLB,,, | Performed by: NURSE PRACTITIONER

## 2021-12-21 PROCEDURE — 4010F PR ACE/ARB THEARPY RXD/TAKEN: ICD-10-PCS | Mod: CPTII,S$GLB,, | Performed by: NURSE PRACTITIONER

## 2021-12-21 PROCEDURE — 99999 PR PBB SHADOW E&M-EST. PATIENT-LVL IV: CPT | Mod: PBBFAC,,, | Performed by: NURSE PRACTITIONER

## 2021-12-21 PROCEDURE — 99214 PR OFFICE/OUTPT VISIT, EST, LEVL IV, 30-39 MIN: ICD-10-PCS | Mod: S$GLB,,, | Performed by: NURSE PRACTITIONER

## 2021-12-27 ENCOUNTER — PATIENT MESSAGE (OUTPATIENT)
Dept: UROLOGY | Facility: CLINIC | Age: 71
End: 2021-12-27
Payer: MEDICARE

## 2022-01-03 ENCOUNTER — PATIENT MESSAGE (OUTPATIENT)
Dept: UROLOGY | Facility: CLINIC | Age: 72
End: 2022-01-03
Payer: MEDICARE

## 2022-01-04 ENCOUNTER — OFFICE VISIT (OUTPATIENT)
Dept: UROLOGY | Facility: CLINIC | Age: 72
End: 2022-01-04
Payer: MEDICARE

## 2022-01-04 VITALS
DIASTOLIC BLOOD PRESSURE: 74 MMHG | SYSTOLIC BLOOD PRESSURE: 121 MMHG | BODY MASS INDEX: 28.58 KG/M2 | HEART RATE: 68 BPM | HEIGHT: 67 IN | WEIGHT: 182.13 LBS

## 2022-01-04 DIAGNOSIS — R10.2 PELVIC PAIN: Primary | ICD-10-CM

## 2022-01-04 PROCEDURE — 1125F AMNT PAIN NOTED PAIN PRSNT: CPT | Mod: CPTII,S$GLB,, | Performed by: UROLOGY

## 2022-01-04 PROCEDURE — 1159F PR MEDICATION LIST DOCUMENTED IN MEDICAL RECORD: ICD-10-PCS | Mod: CPTII,S$GLB,, | Performed by: UROLOGY

## 2022-01-04 PROCEDURE — 1159F MED LIST DOCD IN RCRD: CPT | Mod: CPTII,S$GLB,, | Performed by: UROLOGY

## 2022-01-04 PROCEDURE — 99214 PR OFFICE/OUTPT VISIT, EST, LEVL IV, 30-39 MIN: ICD-10-PCS | Mod: S$GLB,,, | Performed by: UROLOGY

## 2022-01-04 PROCEDURE — 1101F PT FALLS ASSESS-DOCD LE1/YR: CPT | Mod: CPTII,S$GLB,, | Performed by: UROLOGY

## 2022-01-04 PROCEDURE — 1101F PR PT FALLS ASSESS DOC 0-1 FALLS W/OUT INJ PAST YR: ICD-10-PCS | Mod: CPTII,S$GLB,, | Performed by: UROLOGY

## 2022-01-04 PROCEDURE — 3074F SYST BP LT 130 MM HG: CPT | Mod: CPTII,S$GLB,, | Performed by: UROLOGY

## 2022-01-04 PROCEDURE — 3078F PR MOST RECENT DIASTOLIC BLOOD PRESSURE < 80 MM HG: ICD-10-PCS | Mod: CPTII,S$GLB,, | Performed by: UROLOGY

## 2022-01-04 PROCEDURE — 3074F PR MOST RECENT SYSTOLIC BLOOD PRESSURE < 130 MM HG: ICD-10-PCS | Mod: CPTII,S$GLB,, | Performed by: UROLOGY

## 2022-01-04 PROCEDURE — 3288F FALL RISK ASSESSMENT DOCD: CPT | Mod: CPTII,S$GLB,, | Performed by: UROLOGY

## 2022-01-04 PROCEDURE — 3008F BODY MASS INDEX DOCD: CPT | Mod: CPTII,S$GLB,, | Performed by: UROLOGY

## 2022-01-04 PROCEDURE — 99214 OFFICE O/P EST MOD 30 MIN: CPT | Mod: S$GLB,,, | Performed by: UROLOGY

## 2022-01-04 PROCEDURE — 3078F DIAST BP <80 MM HG: CPT | Mod: CPTII,S$GLB,, | Performed by: UROLOGY

## 2022-01-04 PROCEDURE — 99999 PR PBB SHADOW E&M-EST. PATIENT-LVL III: CPT | Mod: PBBFAC,,, | Performed by: UROLOGY

## 2022-01-04 PROCEDURE — 3288F PR FALLS RISK ASSESSMENT DOCUMENTED: ICD-10-PCS | Mod: CPTII,S$GLB,, | Performed by: UROLOGY

## 2022-01-04 PROCEDURE — 1125F PR PAIN SEVERITY QUANTIFIED, PAIN PRESENT: ICD-10-PCS | Mod: CPTII,S$GLB,, | Performed by: UROLOGY

## 2022-01-04 PROCEDURE — 99999 PR PBB SHADOW E&M-EST. PATIENT-LVL III: ICD-10-PCS | Mod: PBBFAC,,, | Performed by: UROLOGY

## 2022-01-04 PROCEDURE — 3008F PR BODY MASS INDEX (BMI) DOCUMENTED: ICD-10-PCS | Mod: CPTII,S$GLB,, | Performed by: UROLOGY

## 2022-01-04 RX ORDER — DOXYCYCLINE HYCLATE 100 MG
100 TABLET ORAL ONCE
Status: CANCELLED | OUTPATIENT
Start: 2022-01-04 | End: 2022-01-04

## 2022-01-04 RX ORDER — DULOXETIN HYDROCHLORIDE 30 MG/1
30 CAPSULE, DELAYED RELEASE ORAL DAILY
Qty: 30 CAPSULE | Refills: 3 | Status: SHIPPED | OUTPATIENT
Start: 2022-01-04 | End: 2022-04-22

## 2022-01-04 RX ORDER — LIDOCAINE HYDROCHLORIDE 20 MG/ML
JELLY TOPICAL ONCE
Status: CANCELLED | OUTPATIENT
Start: 2022-01-04 | End: 2022-01-04

## 2022-01-04 NOTE — PROGRESS NOTES
"CHIEF COMPLAINT:    Bay Yadav is a 71 y.o. male presents today for chronic pelvic pain    HISTORY OF PRESENTING ILLINESS:    Bay Yadav is a 71 y.o. male is a new pt to me, referred by Samia Hodgson for evaluation of pelvic pain x 2 years.  has hx of prostate cancer s/p RALP on 11/18/2019 by Dr. Clements.    Nia 6 (3+3) with (-) extension, margins, & SV. Since his surgery he has dealt with OCTAVIA that did improve somewhat with Kegel exercises, but still needs to use a sanitary pad for the occasional leaking.       No family history of prostate cancer.      Since then, he c/o ED.    Prior to surgery, he did not have ED.  However, he also notes that 6mos prior to his RALP with Dr. Clements, he had a "penile fracture" followed by peyronie's disease.    He's s/p xiaflex with some improvement.  He had 90 degrees of curve to the L.    Pt states now that his erection are "straight" and he has recently been able to penetrate during sexual activity.  Last visit with Dr. Georges 08/2020; planned on having IPP but canceled due to concerns over pain.   He had concern due to his already having this long term penis pain/dysuria  Has Rx for Cialis for not taking     Recent PSA on 12/14/2021 was <0.01.  Still trouble with erections at times.  Penis pain with dysuria still present; no improvement with antiinflammatories.   No hematuria or bladder infection seen.  FOS is good but can vary; nocturia x 2  Good control     Pt does report a chronic hx of nephrolithiasis which was documented most recently on renal u/s in 2020. He reports passing a very small stone three weeks ago.      Last colonoscopy was 12/29/2020        REVIEW OF SYSTEMS:  Review of Systems   Constitutional: Negative.  Negative for chills and fever.   Eyes: Negative for double vision.   Respiratory: Negative for cough and shortness of breath.    Cardiovascular: Negative for chest pain and palpitations.   Gastrointestinal: Negative for nausea and " vomiting.   Genitourinary: Positive for dysuria and frequency. Negative for flank pain and hematuria.        Chronic penis/pelvic discomfort.   (+) LUTS/nocturia       Neurological: Negative for dizziness.         PATIENT HISTORY:    Past Medical History:   Diagnosis Date    Acid reflux     Atherosclerosis of abdominal aorta     Borderline diabetes     Cervical disc herniation     DDD (degenerative disc disease), cervical     DDD (degenerative disc disease), lumbar     Depression     Erectile dysfunction following radical prostatectomy     GERD (gastroesophageal reflux disease)     Heart murmur     Hyperlipidemia     Lumbar disc herniation     Nephrolithiasis     Peyronie's disease     Prostate cancer     Upper GI bleed        Past Surgical History:   Procedure Laterality Date    EYE SURGERY      ROBOT-ASSISTED LAPAROSCOPIC PELVIC LYMPHADENECTOMY USING DA ASHLEY XI N/A 11/18/2019    Procedure: XI ROBOTIC LYMPHADENECTOMY, PELVIC;  Surgeon: Jerardo Clements MD;  Location: [facility] OR [address] [place] [address];  Service: Urology;  Laterality: N/A;    ROBOT-ASSISTED LAPAROSCOPIC PROSTATECTOMY USING DA ASHLEY XI N/A 11/18/2019    Procedure: XI ROBOTIC PROSTATECTOMY;  Surgeon: Jerardo Clements MD;  Location: [facility] OR [address] [place] [address];  Service: Urology;  Laterality: N/A;  3hrs/ gen with regional       Family History   Problem Relation Age of Onset    Cancer Mother     Cancer Father     Cancer Brother        Social History     Socioeconomic History    Marital status:    Tobacco Use    Smoking status: Former Smoker    Smokeless tobacco: Never Used   Substance and Sexual Activity    Alcohol use: No    Drug use: No       Allergies:  Asa [aspirin]    Medications:    Current Outpatient Medications:     butalbital-acetaminophen-caffeine -40 mg (FIORICET, ESGIC) -40 mg per tablet, TAKE 1 TABLET BY MOUTH EVERY 6 HOURS AS NEEDED, Disp: 30 tablet, Rfl: 3    EScitalopram oxalate (LEXAPRO) 10 MG tablet, TAKE 1  TABLET(10 MG) BY MOUTH EVERY DAY, Disp: 90 tablet, Rfl: 3    lisinopriL 10 MG tablet, TAKE 1 TABLET(10 MG) BY MOUTH EVERY DAY, Disp: 90 tablet, Rfl: 3    meclizine (ANTIVERT) 25 mg tablet, TAKE 1 TABLET(25 MG) BY MOUTH THREE TIMES DAILY AS NEEDED, Disp: 30 tablet, Rfl: 1    multivitamin with minerals tablet, Take 1 tablet by mouth once daily., Disp: , Rfl:     omeprazole (PRILOSEC) 40 MG capsule, TAKE 1 CAPSULE(40 MG) BY MOUTH EVERY DAY, Disp: 90 capsule, Rfl: 3    promethazine (PHENERGAN) 25 MG tablet, Take 1 tablet (25 mg total) by mouth every 6 (six) hours as needed for Nausea., Disp: 30 tablet, Rfl: 2    rosuvastatin (CRESTOR) 20 MG tablet, TAKE 1 TABLET(20 MG) BY MOUTH EVERY DAY, Disp: 90 tablet, Rfl: 3    tadalafiL (CIALIS) 20 MG Tab, Take 1 tablet every 3 days on an empty stomach for penile rehab, Disp: 30 tablet, Rfl: 12    traMADoL (ULTRAM) 50 mg tablet, Take 1 tablet (50 mg total) by mouth every 6 (six) hours as needed for Pain., Disp: 120 tablet, Rfl: 3    DULoxetine (CYMBALTA) 30 MG capsule, Take 1 capsule (30 mg total) by mouth once daily., Disp: 30 capsule, Rfl: 3    PHYSICAL EXAMINATION:  Physical Exam  Vitals and nursing note reviewed.   Constitutional:       General: He is awake.      Appearance: Normal appearance.   HENT:      Head: Normocephalic.      Right Ear: External ear normal.      Left Ear: External ear normal.      Nose: Nose normal.   Cardiovascular:      Rate and Rhythm: Normal rate.   Pulmonary:      Effort: Pulmonary effort is normal. No respiratory distress.   Abdominal:      Tenderness: There is no abdominal tenderness. There is no right CVA tenderness or left CVA tenderness.   Genitourinary:     Penis: Normal.       Testes: Normal.      Comments: Prostate is surgically absent with undetectable PSA. Slight irritation and erythema noted at urethral meatus.     Musculoskeletal:         General: Normal range of motion.      Cervical back: Normal range of motion.   Skin:      General: Skin is warm and dry.   Neurological:      General: No focal deficit present.      Mental Status: He is alert and oriented to person, place, and time.   Psychiatric:         Mood and Affect: Mood normal.         Behavior: Behavior is cooperative.           LABS:      In office UA today was clear of infection/blood      Lab Results   Component Value Date    PSADIAG <0.01 12/14/2021    PSADIAG <0.01 06/04/2021    PSADIAG <0.01 05/11/2021             IMPRESSION:    Encounter Diagnoses   Name Primary?    Pelvic pain Yes         Assessment:       Pelvic pain  -     DULoxetine (CYMBALTA) 30 MG capsule; Take 1 capsule (30 mg total) by mouth once daily.  Dispense: 30 capsule; Refill: 3  -     Cystoscopy; Future      Plan:         I suspect that his pain seems to be related to neurologic in origin.    For chronic pelvic pain, pt instructed to take Cymbalta.   Start 30 mg cymbalta and titrate up to 60 mg daily.  For additional pain management, may consider Neurontin.  Pt carefully instructed on side effects and appropriate dosing/usage  Will schedule cystoscopy in the future for further examination         Follow up for Cysto.

## 2022-02-02 DIAGNOSIS — M51.36 DDD (DEGENERATIVE DISC DISEASE), LUMBAR: ICD-10-CM

## 2022-02-02 RX ORDER — TRAMADOL HYDROCHLORIDE 50 MG/1
TABLET ORAL
Qty: 120 TABLET | Refills: 3 | Status: SHIPPED | OUTPATIENT
Start: 2022-02-02 | End: 2022-05-30

## 2022-02-02 NOTE — TELEPHONE ENCOUNTER
No new care gaps identified.  Powered by [employer]. Reference number: [id number].   2/02/2022 4:01:34 PM CST

## 2022-02-03 ENCOUNTER — PROCEDURE VISIT (OUTPATIENT)
Dept: UROLOGY | Facility: CLINIC | Age: 72
End: 2022-02-03
Payer: MEDICARE

## 2022-02-03 VITALS
SYSTOLIC BLOOD PRESSURE: 128 MMHG | BODY MASS INDEX: 27.89 KG/M2 | TEMPERATURE: 99 F | HEART RATE: 69 BPM | HEIGHT: 67 IN | WEIGHT: 177.69 LBS | RESPIRATION RATE: 17 BRPM | DIASTOLIC BLOOD PRESSURE: 84 MMHG

## 2022-02-03 DIAGNOSIS — R10.2 PELVIC PAIN: ICD-10-CM

## 2022-02-03 PROCEDURE — 52000 CYSTOSCOPY: ICD-10-PCS | Mod: GC,S$GLB,, | Performed by: STUDENT IN AN ORGANIZED HEALTH CARE EDUCATION/TRAINING PROGRAM

## 2022-02-03 PROCEDURE — 52000 CYSTOURETHROSCOPY: CPT | Mod: GC,S$GLB,, | Performed by: STUDENT IN AN ORGANIZED HEALTH CARE EDUCATION/TRAINING PROGRAM

## 2022-02-03 RX ORDER — LIDOCAINE HYDROCHLORIDE 20 MG/ML
JELLY TOPICAL ONCE
Status: COMPLETED | OUTPATIENT
Start: 2022-02-03 | End: 2022-02-03

## 2022-02-03 RX ORDER — DOXYCYCLINE HYCLATE 100 MG
100 TABLET ORAL ONCE
Status: COMPLETED | OUTPATIENT
Start: 2022-02-03 | End: 2022-02-03

## 2022-02-03 RX ADMIN — LIDOCAINE HYDROCHLORIDE: 20 JELLY TOPICAL at 10:02

## 2022-02-03 RX ADMIN — Medication 100 MG: at 10:02

## 2022-02-03 NOTE — PATIENT INSTRUCTIONS
What to Expect After a Cystoscopy  For the next 24-48 hours, you may feel a mild burning when you urinate. This burning is normal and expected. Drink plenty of water to dilute the urine to help relieve the burning sensation. You may also see a small amount of blood in your urine after the procedure.    Unless you are already taking antibiotics, you may be given an antibiotic after the test to prevent infection.    Signs and Symptoms to Report  Call the [facility] at [phone] if you develop any of the following:  · Fever of 101 degrees or higher  · Chills or persistent bleeding  · Inability to urinate

## 2022-02-03 NOTE — PROCEDURES
"Bay Yadav is a 71 y.o. male patient.    Temp: 98.6 °F (37 °C) (02/03/22 1028)  Pulse: 66 (02/03/22 1028)  Resp: 17 (02/03/22 1028)  BP: (!) 157/81 (02/03/22 1028)  Weight: 80.6 kg (177 lb 11.2 oz) (02/03/22 1028)  Height: 5' 7" (170.2 cm) (02/03/22 1028)       Cystoscopy    Date/Time: 2/3/2022 10:51 AM  Performed by: Khang Cerda MD  Authorized by: Barber Bey MD     Consent Done?:  Yes (Written)  Time out: Immediately prior to procedure a "time out" was called to verify the correct patient, procedure, equipment, support staff and site/side marked as required.    Indications comment:  Pelvic pain  Anesthesia:  Intraurethral instillation  Patient sedated?: No      Scope type:  Flexible cystoscope  Urethra normal: Yes    Prostate normal: surgically absent.  Bladder neck normal: Bladder neck normal   Bladder normal: Yes      Patient tolerance:  Patient tolerated the procedure well with no immediate complications     Small thin fibrous scar distal to sphincter ~18 Fr.   Scope passed with ease. Prostate surgically absent.   Bladder normal.     No anatomic abnormality for chronic pelvic pain noted.   Will schedule patient to see pain medicine.         2/3/2022  "

## 2022-03-11 DIAGNOSIS — F32.A DEPRESSION, UNSPECIFIED DEPRESSION TYPE: ICD-10-CM

## 2022-03-11 DIAGNOSIS — E78.2 MIXED HYPERLIPIDEMIA: ICD-10-CM

## 2022-03-12 NOTE — TELEPHONE ENCOUNTER
No new care gaps identified.  Powered by [employer]. Reference number: [id number].   3/11/2022 8:02:19 PM CST

## 2022-03-16 DIAGNOSIS — E78.2 MIXED HYPERLIPIDEMIA: ICD-10-CM

## 2022-03-16 DIAGNOSIS — F32.A DEPRESSION, UNSPECIFIED DEPRESSION TYPE: ICD-10-CM

## 2022-03-16 RX ORDER — ROSUVASTATIN CALCIUM 20 MG/1
20 TABLET, COATED ORAL DAILY
Qty: 90 TABLET | Refills: 3 | Status: SHIPPED | OUTPATIENT
Start: 2022-03-16 | End: 2023-03-07

## 2022-03-16 RX ORDER — ESCITALOPRAM OXALATE 10 MG/1
10 TABLET ORAL DAILY
Qty: 90 TABLET | Refills: 3 | Status: SHIPPED | OUTPATIENT
Start: 2022-03-16 | End: 2023-03-13 | Stop reason: SDUPTHER

## 2022-03-16 NOTE — TELEPHONE ENCOUNTER
No new care gaps identified.  Powered by [employer]. Reference number: [id number].   3/16/2022 12:45:07 PM CDT

## 2022-03-16 NOTE — TELEPHONE ENCOUNTER
----- Message from Louise Rangel sent at 3/16/2022 12:40 PM CDT -----  Contact: [phone]  Requesting an RX refill or new RX.  Is this a refill or new RX: refill  RX name and strength (copy/paste from chart):rosuvastatin (CRESTOR) 20 MG tablet    Is this a 30 day or 90 day RX: 90  Pharmacy name and phone # (copy/paste from chart):    [id number] - [place] - [address] [facility] [address] [address]  Phone: [phone] Fax: [phone]     The doctors have asked that we provide their patients with the following 2 reminders -- prescription refills can take up to 72 hours, and a friendly reminder that in the future you can use your [facility] account to request refills: yes         Requesting an RX refill or new RX.  Is this a refill or new RX: refill  RX name and strength (copy/paste from chart):  EScitalopram oxalate (LEXAPRO) 10 MG tablet  Is this a 30 day or 90 day RX: 90  Pharmacy name and phone # (copy/paste from chart):    [facility] #[id number] - [place] - [address] AT [facility] [address] [address]  Phone: [phone] Fax: [phone]     The doctors have asked that we provide their patients with the following 2 reminders -- prescription refills can take up to 72 hours, and a friendly reminder that in the future you can use your [facility] account to request refills:yes

## 2022-03-21 RX ORDER — ESCITALOPRAM OXALATE 10 MG/1
TABLET ORAL
Qty: 90 TABLET | Refills: 3 | OUTPATIENT
Start: 2022-03-21

## 2022-03-21 RX ORDER — ROSUVASTATIN CALCIUM 20 MG/1
TABLET, COATED ORAL
Qty: 90 TABLET | Refills: 3 | OUTPATIENT
Start: 2022-03-21

## 2022-03-24 ENCOUNTER — TELEPHONE (OUTPATIENT)
Dept: NEUROLOGY | Facility: CLINIC | Age: 72
End: 2022-03-24
Payer: MEDICARE

## 2022-03-25 ENCOUNTER — TELEPHONE (OUTPATIENT)
Dept: NEUROLOGY | Facility: CLINIC | Age: 72
End: 2022-03-25
Payer: MEDICARE

## 2022-03-25 NOTE — TELEPHONE ENCOUNTER
Offered 7 day release with Dr. Aneesh Giordano on 04/22/22 at 0930. Accepted offer. Will schedule once released.

## 2022-03-25 NOTE — TELEPHONE ENCOUNTER
----- Message from Janessa Ann sent at 3/24/2022  2:39 PM CDT -----  Contact: Patient  Type: Patient Call Back         Who called: Patient         What is the request in detail: Bora Bonner I have Bay Henao returning your missed call regarding sched an appt; please advise         Can the clinic reply by [facility]?call back         Would the patient rather a call back or a response via [facility]? Call back         Best call back number: [phone]         Additional Information:            Thank You

## 2022-04-07 DIAGNOSIS — G44.219 EPISODIC TENSION-TYPE HEADACHE, NOT INTRACTABLE: ICD-10-CM

## 2022-04-07 RX ORDER — BUTALBITAL, ACETAMINOPHEN AND CAFFEINE 50; 325; 40 MG/1; MG/1; MG/1
1 TABLET ORAL EVERY 6 HOURS PRN
Qty: 30 TABLET | Refills: 3 | Status: SHIPPED | OUTPATIENT
Start: 2022-04-07 | End: 2022-08-15 | Stop reason: SDUPTHER

## 2022-04-07 NOTE — TELEPHONE ENCOUNTER
----- Message from Marcelina Nicolas sent at 4/7/2022 11:59 AM CDT -----  Contact: [phone]  Pt states he and the pharmacy has been trying to get medication for the last 6 days he states the pharmacy told him they have called but he does not think so he needs this medication filled please advise and give return call when this has been sent in for the pt     Requesting an RX refill or new RX.  Is this a refill or new RX: refill  RX name and strength (copy/paste from chart): butalbital-acetaminophen-caffeine -40 mg (FIORICET, ESGIC) -40 mg per tablet   Is this a 30 day or 90 day RX: 30  Pharmacy name and phone # (copy/paste from chart):    [id number] - [place] - [address] AT [facility] [address]  [address] [address]  Phone: [phone] Fax: [phone]     The doctors have asked that we provide their patients with the following 2 reminders -- prescription refills can take up to 72 hours, and a friendly reminder that in the future you can use your [facility] account to request refills: yes

## 2022-04-07 NOTE — TELEPHONE ENCOUNTER
No new care gaps identified.  Powered by [employer] by [email or web address]. Reference number: [id number].   4/07/2022 12:06:59 PM CDT

## 2022-04-21 NOTE — PROGRESS NOTES
[facility] - NEUROLOGY [facility], [place]    Date: 4/22/22  Patient Name: Bay Yadav   MRN: [id number]   PCP: Wayne Brennan  Referring Provider: Barber Bey MD    Assessment:   Bay Yadav is a 71 y.o. male presenting as initial evaluation for pelvic/penis pain for 5 years. Described as constant burning, 11/10 at its worst that radiates to the groin. Symptoms started prior to prostate cancer surgery and penile fracture. Patient has failed medical therapy with Cymbalta. He has known lumbar degenerative disc disease but no sacral imaging has been done to look at nerves that innervate area.     Plan:     Problem List Items Addressed This Visit        Neuro    DDD (degenerative disc disease), lumbar    Current Assessment & Plan     No imaging review.               Psychiatric    Depression    Current Assessment & Plan     On lexapro and was on cymbalta for neuropathy and failed therapy               Renal/    Penis pain    Relevant Orders    MRI Sacrum/Coccyx (Bony) W W/O Contrast       GI    Pelvic pain in male - Primary    Current Assessment & Plan     Constant burning sensation that wakes him up at night. Failed medical management with Cymbalta.   Has known history of prostate cancer and likely scar damage tissue post op.   - No sacral imaging done, will need to review pudendal nerve   - MRI sacral w/wo contrast.   - Will start gabapentin 300mg HS for neuropathy.   - Follow up in 3 months            Relevant Orders    MRI Sacrum/Coccyx (Bony) W W/O Contrast      Other Visit Diagnoses     Idiopathic peripheral neuropathy        Relevant Medications    gabapentin (NEURONTIN) 300 MG capsule    Other Relevant Orders    MRI Sacrum/Coccyx (Bony) W W/O Contrast          Nina Giordano MD    Patient note was created using MModal Dictation.  Any errors in syntax or even information may not have been identified and edited on initial review prior to  signing this note.  Subjective:   Patient seen in consultation at the request of Barber Bey MD for the evaluation of pelvic pain. A copy of this note will be sent to the referring physician.        HPI:   Mr. Bay Yadav is a 71 y.o. male w PMH DDD, prostate cancer s/p RALP on 11/18/2019, HLD , presenting as initial evaluation for pelvic pain. Patient has been having severe burning pain at penile area, localized only to body external/internal, that radiates to scrotum.  He described it as a constant UTI feeling. Pain is so severe that can wake him up at night. Pain is constant, 10/10 at its worst. No alleviating/worsening features. Not associated w tight close, no hx of trauma at this time or STD. Patient denies numbness, ED. Does have urinary incontinence since surgery. Has seen urologist and thought to be nerve damage. Had a cytoscopy, nerve/ scar tissue seen. Patient had RALP 2019 and then had a penile fracture 8 months after.     Was started on Cymbalta 30mg, with no relief. Has not tried gabapentin.   Has history of lumbar DDD but has not had surgery.     PAST MEDICAL HISTORY:  Past Medical History:   Diagnosis Date    Acid reflux     Atherosclerosis of abdominal aorta     Borderline diabetes     Cervical disc herniation     DDD (degenerative disc disease), cervical     DDD (degenerative disc disease), lumbar     Depression     Erectile dysfunction following radical prostatectomy     GERD (gastroesophageal reflux disease)     Heart murmur     Hyperlipidemia     Lumbar disc herniation     Nephrolithiasis     Peyronie's disease     Prostate cancer     Upper GI bleed        PAST SURGICAL HISTORY:  Past Surgical History:   Procedure Laterality Date    EYE SURGERY      ROBOT-ASSISTED LAPAROSCOPIC PELVIC LYMPHADENECTOMY USING DA ASHLEY XI N/A 11/18/2019    Procedure: XI ROBOTIC LYMPHADENECTOMY, PELVIC;  Surgeon: Jerardo Clements MD;  Location: [facility] OR [address] [place] [address];  Service: Urology;   Laterality: N/A;    ROBOT-ASSISTED LAPAROSCOPIC PROSTATECTOMY USING DA ASHLEY XI N/A 11/18/2019    Procedure: XI ROBOTIC PROSTATECTOMY;  Surgeon: Jerardo Clements MD;  Location: [facility] OR [address] [place] [address];  Service: Urology;  Laterality: N/A;  3hrs/ gen with regional       CURRENT MEDS:  Current Outpatient Medications   Medication Sig Dispense Refill    butalbital-acetaminophen-caffeine -40 mg (FIORICET, ESGIC) -40 mg per tablet Take 1 tablet by mouth every 6 (six) hours as needed for Headaches. 30 tablet 3    EScitalopram oxalate (LEXAPRO) 10 MG tablet Take 1 tablet (10 mg total) by mouth once daily. 90 tablet 3    lisinopriL 10 MG tablet TAKE 1 TABLET(10 MG) BY MOUTH EVERY DAY 90 tablet 3    meclizine (ANTIVERT) 25 mg tablet TAKE 1 TABLET(25 MG) BY MOUTH THREE TIMES DAILY AS NEEDED 30 tablet 1    multivitamin with minerals tablet Take 1 tablet by mouth once daily.      omeprazole (PRILOSEC) 40 MG capsule TAKE 1 CAPSULE(40 MG) BY MOUTH EVERY DAY 90 capsule 3    promethazine (PHENERGAN) 25 MG tablet TAKE 1 TABLET(25 MG) BY MOUTH EVERY 6 HOURS AS NEEDED FOR NAUSEA 30 tablet 2    rosuvastatin (CRESTOR) 20 MG tablet Take 1 tablet (20 mg total) by mouth once daily. 90 tablet 3    traMADoL (ULTRAM) 50 mg tablet TAKE 1 TABLET BY MOUTH EVERY 6 HOURS AS NEEDED FOR PAIN 120 tablet 3    gabapentin (NEURONTIN) 300 MG capsule Take 1 capsule (300 mg total) by mouth every evening. 90 each 1    tadalafiL (CIALIS) 20 MG Tab Take 1 tablet every 3 days on an empty stomach for penile rehab (Patient not taking: Reported on 4/22/2022) 30 tablet 12     No current facility-administered medications for this visit.       ALLERGIES:  Review of patient's allergies indicates:   Allergen Reactions    Asa [aspirin]      Gastro bleed       FAMILY HISTORY:  Family History   Problem Relation Age of Onset    Cancer Mother     Cancer Father     Cancer Brother        SOCIAL HISTORY:  Social History     Tobacco Use    Smoking status:  "Former Smoker    Smokeless tobacco: Never Used   Substance Use Topics    Alcohol use: No    Drug use: No       Review of Systems:  12 system review of systems is negative except for the symptoms mentioned in HPI.      Objective:     Vitals:    04/22/22 0919   BP: 136/77   BP Location: Left arm   Patient Position: Sitting   BP Method: Large (Automatic)   Pulse: 86   Weight: 82.1 kg (181 lb)   Height: 5' 7" (1.702 m)     General: NAD, well nourished   Eyes: no tearing, discharge, no erythema   ENT: moist mucous membranes of the oral cavity, nares patent    Neck: Supple, full range of motion  Cardiovascular: Warm and well perfused, pulses equal and symmetrical  Lungs: Normal work of breathing, normal chest wall excursions  Skin: No rash, lesions, or breakdown on exposed skin  Psychiatry: Mood and affect are appropriate   Abdomen: soft, non tender, non distended  Extremeties: No cyanosis, clubbing or edema.    Neurological   MENTAL STATUS: Alert and oriented to person, place, and time. Attention and concentration within normal limits. Speech without dysarthria, able to name and repeat without difficulty. Recent and remote memory within normal limits   CRANIAL NERVES: Visual fields intact. PERRL. EOMI. Facial sensation intact. Face symmetrical. Hearing grossly intact. Full shoulder shrug bilaterally. Tongue protrudes midline   SENSORY: Sensation is intact to light touch throughout.  Joint position perception intact. Negative Romberg.   MOTOR: Normal bulk and tone. No pronator drift.  5/5 deltoid, biceps, triceps, interosseous, hand  bilaterally. 5/5 iliopsoas, knee extension/flexion, foot dorsi/plantarflexion bilaterally.    REFLEXES: Symmetric and 2+ throughout. Toes down going bilaterally.   CEREBELLAR/COORDINATION/GAIT: Gait steady with normal arm swing and stride length.  Heel to shin intact. Finger to nose intact. Normal rapid alternating movements.         "

## 2022-04-22 ENCOUNTER — PATIENT OUTREACH (OUTPATIENT)
Dept: ADMINISTRATIVE | Facility: OTHER | Age: 72
End: 2022-04-22
Payer: MEDICARE

## 2022-04-22 ENCOUNTER — OFFICE VISIT (OUTPATIENT)
Dept: NEUROLOGY | Facility: CLINIC | Age: 72
End: 2022-04-22
Payer: MEDICARE

## 2022-04-22 VITALS
HEIGHT: 67 IN | BODY MASS INDEX: 28.41 KG/M2 | DIASTOLIC BLOOD PRESSURE: 77 MMHG | HEART RATE: 86 BPM | WEIGHT: 181 LBS | SYSTOLIC BLOOD PRESSURE: 136 MMHG

## 2022-04-22 DIAGNOSIS — N48.89 PENIS PAIN: ICD-10-CM

## 2022-04-22 DIAGNOSIS — M51.36 DDD (DEGENERATIVE DISC DISEASE), LUMBAR: ICD-10-CM

## 2022-04-22 DIAGNOSIS — F32.A DEPRESSION, UNSPECIFIED DEPRESSION TYPE: ICD-10-CM

## 2022-04-22 DIAGNOSIS — R10.2 PELVIC PAIN IN MALE: Primary | ICD-10-CM

## 2022-04-22 DIAGNOSIS — G60.9 IDIOPATHIC PERIPHERAL NEUROPATHY: ICD-10-CM

## 2022-04-22 PROCEDURE — 99999 PR PBB SHADOW E&M-EST. PATIENT-LVL III: CPT | Mod: PBBFAC,GC,, | Performed by: STUDENT IN AN ORGANIZED HEALTH CARE EDUCATION/TRAINING PROGRAM

## 2022-04-22 PROCEDURE — 99204 PR OFFICE/OUTPT VISIT, NEW, LEVL IV, 45-59 MIN: ICD-10-PCS | Mod: S$GLB,,, | Performed by: STUDENT IN AN ORGANIZED HEALTH CARE EDUCATION/TRAINING PROGRAM

## 2022-04-22 PROCEDURE — 99204 OFFICE O/P NEW MOD 45 MIN: CPT | Mod: S$GLB,,, | Performed by: STUDENT IN AN ORGANIZED HEALTH CARE EDUCATION/TRAINING PROGRAM

## 2022-04-22 PROCEDURE — 99999 PR PBB SHADOW E&M-EST. PATIENT-LVL III: ICD-10-PCS | Mod: PBBFAC,GC,, | Performed by: STUDENT IN AN ORGANIZED HEALTH CARE EDUCATION/TRAINING PROGRAM

## 2022-04-22 RX ORDER — GABAPENTIN 300 MG/1
300 CAPSULE ORAL NIGHTLY
Qty: 90 EACH | Refills: 1 | Status: SHIPPED | OUTPATIENT
Start: 2022-04-22 | End: 2022-11-18

## 2022-04-22 NOTE — ASSESSMENT & PLAN NOTE
Constant burning sensation that wakes him up at night. Failed medical management with Cymbalta.   Has known history of prostate cancer and likely scar damage tissue post op.   - No sacral imaging done, will need to review pudendal nerve   - MRI sacral w/wo contrast.   - Will start gabapentin 300mg HS for neuropathy.   - Follow up in 3 months

## 2022-04-22 NOTE — PROGRESS NOTES
Health Maintenance Due   Topic Date Due    TETANUS VACCINE  Never done    Shingles Vaccine (1 of 2) Never done    COVID-19 Vaccine (3 - Booster for Pfizer series) 01/13/2022     Updates were requested from care everywhere.  Chart was reviewed for overdue Proactive [facility] Encounters (NISA) topics (CRS, Breast Cancer Screening, Eye exam)  Health Maintenance has been updated.  LINKS immunization registry triggered.  Immunizations were reconciled.

## 2022-05-18 ENCOUNTER — TELEPHONE (OUTPATIENT)
Dept: PRIMARY CARE CLINIC | Facility: CLINIC | Age: 72
End: 2022-05-18

## 2022-05-18 ENCOUNTER — OFFICE VISIT (OUTPATIENT)
Dept: PRIMARY CARE CLINIC | Facility: CLINIC | Age: 72
End: 2022-05-18
Payer: MEDICARE

## 2022-05-18 VITALS
SYSTOLIC BLOOD PRESSURE: 112 MMHG | HEART RATE: 69 BPM | DIASTOLIC BLOOD PRESSURE: 62 MMHG | HEIGHT: 67 IN | RESPIRATION RATE: 18 BRPM | OXYGEN SATURATION: 98 % | BODY MASS INDEX: 27.87 KG/M2 | WEIGHT: 177.56 LBS

## 2022-05-18 DIAGNOSIS — N18.31 STAGE 3A CHRONIC KIDNEY DISEASE: Primary | ICD-10-CM

## 2022-05-18 DIAGNOSIS — I70.0 ATHEROSCLEROSIS OF ABDOMINAL AORTA: ICD-10-CM

## 2022-05-18 DIAGNOSIS — Z23 NEED FOR VACCINATION: ICD-10-CM

## 2022-05-18 DIAGNOSIS — N18.31 STAGE 3A CHRONIC KIDNEY DISEASE: ICD-10-CM

## 2022-05-18 DIAGNOSIS — E78.2 MIXED HYPERLIPIDEMIA: Primary | ICD-10-CM

## 2022-05-18 DIAGNOSIS — C61 PROSTATE CANCER: ICD-10-CM

## 2022-05-18 PROCEDURE — 4010F ACE/ARB THERAPY RXD/TAKEN: CPT | Mod: CPTII,S$GLB,, | Performed by: FAMILY MEDICINE

## 2022-05-18 PROCEDURE — 3074F SYST BP LT 130 MM HG: CPT | Mod: CPTII,S$GLB,, | Performed by: FAMILY MEDICINE

## 2022-05-18 PROCEDURE — 1125F PR PAIN SEVERITY QUANTIFIED, PAIN PRESENT: ICD-10-PCS | Mod: CPTII,S$GLB,, | Performed by: FAMILY MEDICINE

## 2022-05-18 PROCEDURE — 3074F PR MOST RECENT SYSTOLIC BLOOD PRESSURE < 130 MM HG: ICD-10-PCS | Mod: CPTII,S$GLB,, | Performed by: FAMILY MEDICINE

## 2022-05-18 PROCEDURE — 4010F PR ACE/ARB THEARPY RXD/TAKEN: ICD-10-PCS | Mod: CPTII,S$GLB,, | Performed by: FAMILY MEDICINE

## 2022-05-18 PROCEDURE — 99214 PR OFFICE/OUTPT VISIT, EST, LEVL IV, 30-39 MIN: ICD-10-PCS | Mod: S$GLB,,, | Performed by: FAMILY MEDICINE

## 2022-05-18 PROCEDURE — 1160F PR REVIEW ALL MEDS BY PRESCRIBER/CLIN PHARMACIST DOCUMENTED: ICD-10-PCS | Mod: CPTII,S$GLB,, | Performed by: FAMILY MEDICINE

## 2022-05-18 PROCEDURE — 1101F PT FALLS ASSESS-DOCD LE1/YR: CPT | Mod: CPTII,S$GLB,, | Performed by: FAMILY MEDICINE

## 2022-05-18 PROCEDURE — 3288F PR FALLS RISK ASSESSMENT DOCUMENTED: ICD-10-PCS | Mod: CPTII,S$GLB,, | Performed by: FAMILY MEDICINE

## 2022-05-18 PROCEDURE — 3078F DIAST BP <80 MM HG: CPT | Mod: CPTII,S$GLB,, | Performed by: FAMILY MEDICINE

## 2022-05-18 PROCEDURE — 3288F FALL RISK ASSESSMENT DOCD: CPT | Mod: CPTII,S$GLB,, | Performed by: FAMILY MEDICINE

## 2022-05-18 PROCEDURE — 3044F PR MOST RECENT HEMOGLOBIN A1C LEVEL <7.0%: ICD-10-PCS | Mod: CPTII,S$GLB,, | Performed by: FAMILY MEDICINE

## 2022-05-18 PROCEDURE — 99214 OFFICE O/P EST MOD 30 MIN: CPT | Mod: S$GLB,,, | Performed by: FAMILY MEDICINE

## 2022-05-18 PROCEDURE — 99999 PR PBB SHADOW E&M-EST. PATIENT-LVL IV: ICD-10-PCS | Mod: PBBFAC,,, | Performed by: FAMILY MEDICINE

## 2022-05-18 PROCEDURE — 1159F PR MEDICATION LIST DOCUMENTED IN MEDICAL RECORD: ICD-10-PCS | Mod: CPTII,S$GLB,, | Performed by: FAMILY MEDICINE

## 2022-05-18 PROCEDURE — 3008F BODY MASS INDEX DOCD: CPT | Mod: CPTII,S$GLB,, | Performed by: FAMILY MEDICINE

## 2022-05-18 PROCEDURE — 3078F PR MOST RECENT DIASTOLIC BLOOD PRESSURE < 80 MM HG: ICD-10-PCS | Mod: CPTII,S$GLB,, | Performed by: FAMILY MEDICINE

## 2022-05-18 PROCEDURE — 99999 PR PBB SHADOW E&M-EST. PATIENT-LVL IV: CPT | Mod: PBBFAC,,, | Performed by: FAMILY MEDICINE

## 2022-05-18 PROCEDURE — 1160F RVW MEDS BY RX/DR IN RCRD: CPT | Mod: CPTII,S$GLB,, | Performed by: FAMILY MEDICINE

## 2022-05-18 PROCEDURE — 1101F PR PT FALLS ASSESS DOC 0-1 FALLS W/OUT INJ PAST YR: ICD-10-PCS | Mod: CPTII,S$GLB,, | Performed by: FAMILY MEDICINE

## 2022-05-18 PROCEDURE — 1159F MED LIST DOCD IN RCRD: CPT | Mod: CPTII,S$GLB,, | Performed by: FAMILY MEDICINE

## 2022-05-18 PROCEDURE — 1125F AMNT PAIN NOTED PAIN PRSNT: CPT | Mod: CPTII,S$GLB,, | Performed by: FAMILY MEDICINE

## 2022-05-18 PROCEDURE — 3044F HG A1C LEVEL LT 7.0%: CPT | Mod: CPTII,S$GLB,, | Performed by: FAMILY MEDICINE

## 2022-05-18 PROCEDURE — 3008F PR BODY MASS INDEX (BMI) DOCUMENTED: ICD-10-PCS | Mod: CPTII,S$GLB,, | Performed by: FAMILY MEDICINE

## 2022-05-18 RX ORDER — ZOSTER VACCINE RECOMBINANT, ADJUVANTED 50 MCG/0.5
0.5 KIT INTRAMUSCULAR ONCE
Qty: 1 EACH | Refills: 1 | Status: SHIPPED | OUTPATIENT
Start: 2022-05-18 | End: 2022-05-18

## 2022-05-18 RX ORDER — PROMETHAZINE HYDROCHLORIDE AND DEXTROMETHORPHAN HYDROBROMIDE 6.25; 15 MG/5ML; MG/5ML
5 SYRUP ORAL EVERY 6 HOURS PRN
Qty: 180 ML | Refills: 1 | Status: SHIPPED | OUTPATIENT
Start: 2022-05-18 | End: 2022-08-23

## 2022-05-18 NOTE — PROGRESS NOTES
"Subjective:       Patient ID: Bay Yadav is a 71 y.o. male.    Chief Complaint: Back Pain, Groin Pain, and Cough (Cough for a few weeks on and off)    Blood pressure remains well controlled.  Renal function slightly improved on recent labs.  Occasional cough, no labored breathing or shortness of breath.  Glucose and A1c normal on recent labs    Review of Systems   Constitutional: Negative for chills, fatigue and fever.   HENT: Negative for congestion.    Eyes: Negative for visual disturbance.   Respiratory: Positive for cough. Negative for shortness of breath and wheezing.    Cardiovascular: Negative for chest pain.   Gastrointestinal: Negative for abdominal pain, nausea and vomiting.   Genitourinary: Positive for difficulty urinating. Negative for dysuria.   Musculoskeletal: Positive for arthralgias.   Skin: Negative for rash.   Allergic/Immunologic: Negative for immunocompromised state.   Neurological: Negative for dizziness.   Psychiatric/Behavioral: Negative for agitation, confusion and sleep disturbance.       Objective:      Vitals:    05/18/22 1106   BP: 112/62   BP Location: Right arm   Patient Position: Sitting   BP Method: Medium (Manual)   Pulse: 69   Resp: 18   SpO2: 98%   Weight: 80.6 kg (177 lb 9.3 oz)   Height: 5' 7" (1.702 m)     Physical Exam  Vitals and nursing note reviewed.   Constitutional:       Appearance: He is well-developed.   HENT:      Head: Normocephalic and atraumatic.   Cardiovascular:      Rate and Rhythm: Normal rate and regular rhythm.      Heart sounds: Normal heart sounds.   Pulmonary:      Effort: Pulmonary effort is normal.      Breath sounds: Normal breath sounds.   Abdominal:      General: Bowel sounds are normal. There is no distension.      Tenderness: There is no abdominal tenderness.   Skin:     General: Skin is warm and dry.   Neurological:      Mental Status: He is alert and oriented to person, place, and time.         Lab Results   Component Value Date    WBC " 6.02 05/13/2022    HGB 13.8 (L) 05/13/2022    HCT 42.5 05/13/2022     05/13/2022    CHOL 161 05/13/2022    TRIG 120 05/13/2022    HDL 56 05/13/2022    ALT 28 05/13/2022    AST 23 05/13/2022     05/13/2022    K 4.8 05/13/2022     05/13/2022    CREATININE 1.1 05/13/2022    BUN 13 05/13/2022    CO2 26 05/13/2022    INR 1.0 07/21/2006    HGBA1C 5.4 05/13/2022      Assessment:       1. Mixed hyperlipidemia    2. Prostate cancer    3. Atherosclerosis of abdominal aorta    4. Stage 3a chronic kidney disease    5. Need for vaccination        Plan:       Mixed hyperlipidemia  Stable on current regimen  Prostate cancer  Followed by urology  Atherosclerosis of abdominal aorta  Continue current meds  Stage 3a chronic kidney disease  Renal function slightly improved, avoid nephrotoxins  Need for vaccination  -     varicella-zoster gE-AS01B, PF, (SHINGRIX, PF,) 50 mcg/0.5 mL injection; Inject 0.5 mLs into the muscle once. for 1 dose  Dispense: 1 each; Refill: 1    Other orders  -     promethazine-dextromethorphan (PROMETHAZINE-DM) 6.25-15 mg/5 mL Syrp; Take 5 mLs by mouth every 6 (six) hours as needed (cough).  Dispense: 180 mL; Refill: 1      Medication List with Changes/Refills   New Medications    PROMETHAZINE-DEXTROMETHORPHAN (PROMETHAZINE-DM) 6.25-15 MG/5 ML SYRP    Take 5 mLs by mouth every 6 (six) hours as needed (cough).    VARICELLA-ZOSTER GE-AS01B, PF, (SHINGRIX, PF,) 50 MCG/0.5 ML INJECTION    Inject 0.5 mLs into the muscle once. for 1 dose   Current Medications    BUTALBITAL-ACETAMINOPHEN-CAFFEINE -40 MG (FIORICET, ESGIC) -40 MG PER TABLET    Take 1 tablet by mouth every 6 (six) hours as needed for Headaches.    ESCITALOPRAM OXALATE (LEXAPRO) 10 MG TABLET    Take 1 tablet (10 mg total) by mouth once daily.    GABAPENTIN (NEURONTIN) 300 MG CAPSULE    Take 1 capsule (300 mg total) by mouth every evening.    LISINOPRIL 10 MG TABLET    TAKE 1 TABLET(10 MG) BY MOUTH EVERY DAY    MECLIZINE  (ANTIVERT) 25 MG TABLET    TAKE 1 TABLET(25 MG) BY MOUTH THREE TIMES DAILY AS NEEDED    MULTIVITAMIN WITH MINERALS TABLET    Take 1 tablet by mouth once daily.    OMEPRAZOLE (PRILOSEC) 40 MG CAPSULE    TAKE 1 CAPSULE(40 MG) BY MOUTH EVERY DAY    PROMETHAZINE (PHENERGAN) 25 MG TABLET    TAKE 1 TABLET(25 MG) BY MOUTH EVERY 6 HOURS AS NEEDED FOR NAUSEA    ROSUVASTATIN (CRESTOR) 20 MG TABLET    Take 1 tablet (20 mg total) by mouth once daily.    TRAMADOL (ULTRAM) 50 MG TABLET    TAKE 1 TABLET BY MOUTH EVERY 6 HOURS AS NEEDED FOR PAIN   Discontinued Medications    TADALAFIL (CIALIS) 20 MG TAB    Take 1 tablet every 3 days on an empty stomach for penile rehab

## 2022-05-18 NOTE — TELEPHONE ENCOUNTER
----- Message from Salud Dawson sent at 5/18/2022 11:39 AM CDT -----  Regarding: Labs  Please put lab orders in for the patient. He would like labs done on 11/11/22. Thank you!

## 2022-05-20 ENCOUNTER — TELEPHONE (OUTPATIENT)
Dept: NEUROLOGY | Facility: CLINIC | Age: 72
End: 2022-05-20

## 2022-05-20 NOTE — TELEPHONE ENCOUNTER
----- Message from Josh Briseno sent at 5/20/2022 11:04 AM CDT -----  Name Of Caller: Bay        Provider Name: Nina Giordano        Does patient feel the need to be seen today? no        Relationship to the Pt?: patient        Contact Preference?: [phone]        What is the nature of the call?: Patient has orders in the system for MRI Sacrum/Coccyx (Bony) W W/O Contrast, patient would like to know if the test can be done without contrast due the shortage of contrast

## 2022-05-25 ENCOUNTER — TELEPHONE (OUTPATIENT)
Dept: NEUROLOGY | Facility: CLINIC | Age: 72
End: 2022-05-25

## 2022-05-25 NOTE — TELEPHONE ENCOUNTER
----- Message from Nina Giordano MD sent at 5/17/2022  9:03 AM CDT -----  Good morning,     This patient has and MRI sacral plexux w/wo contrast that was scheduled in [facility], but they just messaged me and radiologist recommending it be done at our imaging center here.   Do you mind re scheduling him and notifying?     Thank you!   Nina Melendrez

## 2022-05-25 NOTE — TELEPHONE ENCOUNTER
I spoke with pt about MRI appointment. The first appointment we had available was 7/10. Patient stated you may have to reach back out to his insurance company because his insurance only approved him until 6/15 to have MRI performed.

## 2022-05-29 DIAGNOSIS — M51.36 DDD (DEGENERATIVE DISC DISEASE), LUMBAR: ICD-10-CM

## 2022-05-29 NOTE — TELEPHONE ENCOUNTER
No new care gaps identified.  [facility] Embedded Care Gaps. Reference number: [id number]. 5/29/2022   2:42:48 PM CDT

## 2022-05-30 RX ORDER — TRAMADOL HYDROCHLORIDE 50 MG/1
TABLET ORAL
Qty: 120 TABLET | Refills: 5 | Status: SHIPPED | OUTPATIENT
Start: 2022-05-30 | End: 2022-05-31 | Stop reason: SDUPTHER

## 2022-05-31 ENCOUNTER — TELEPHONE (OUTPATIENT)
Dept: PRIMARY CARE CLINIC | Facility: CLINIC | Age: 72
End: 2022-05-31
Payer: MEDICARE

## 2022-05-31 DIAGNOSIS — M51.36 DDD (DEGENERATIVE DISC DISEASE), LUMBAR: ICD-10-CM

## 2022-05-31 RX ORDER — TRAMADOL HYDROCHLORIDE 50 MG/1
50 TABLET ORAL EVERY 6 HOURS PRN
Qty: 120 TABLET | Refills: 5 | Status: SHIPPED | OUTPATIENT
Start: 2022-05-31 | End: 2022-11-21

## 2022-05-31 NOTE — TELEPHONE ENCOUNTER
No new care gaps identified.  [facility] Embedded Care Gaps. Reference number: [id number]. 5/31/2022   12:48:40 PM CDT

## 2022-05-31 NOTE — TELEPHONE ENCOUNTER
----- Message from Alma Mccartney sent at 5/31/2022 12:46 PM CDT -----  Contact: [phone]  Pharmacy is calling to clarify an RX.  RX name:  traMADoL (ULTRAM) 50 mg tablet  What do they need to clarify:  needs to say more the 7 days medically necessary.  Comments:       [facility] #[id number] - [place] - [address] [facility] [address] [address]  Phone: [phone] Fax: [phone]

## 2022-05-31 NOTE — TELEPHONE ENCOUNTER
----- Message from Ashly Randolph sent at 5/31/2022 12:35 PM CDT -----  Contact: [phone]  Requesting an RX refill or new RX.  Is this a refill or new RX: Refill 1  RX name and strength (copy/paste from chart):  traMADoL (ULTRAM) 50 mg tablet  Is this a 30 day or 90 day RX:   Pharmacy name and phone # (copy/paste from chart): [facility] #[id number] - [place] - [address] AT [facility] [address]   Phone:  [phone]  Fax:  [phone]         The doctors have asked that we provide their patients with the following 2 reminders -- prescription refills can take up to 72 hours, and a friendly reminder that in the future you can use your [facility] account to request refills:

## 2022-07-15 ENCOUNTER — HOSPITAL ENCOUNTER (OUTPATIENT)
Dept: RADIOLOGY | Facility: HOSPITAL | Age: 72
Discharge: HOME | End: 2022-07-15
Attending: STUDENT IN AN ORGANIZED HEALTH CARE EDUCATION/TRAINING PROGRAM
Payer: MEDICARE

## 2022-07-15 DIAGNOSIS — R10.2 PELVIC PAIN IN MALE: ICD-10-CM

## 2022-07-15 DIAGNOSIS — G60.9 IDIOPATHIC PERIPHERAL NEUROPATHY: ICD-10-CM

## 2022-07-15 DIAGNOSIS — N48.89 PENIS PAIN: ICD-10-CM

## 2022-07-15 PROCEDURE — 72197 MRI PELVIS W/O & W/DYE: CPT | Mod: TC

## 2022-07-15 PROCEDURE — 72197 MRI PELVIS W/O & W/DYE: CPT | Mod: 26,,, | Performed by: RADIOLOGY

## 2022-07-15 PROCEDURE — 72197 MRI SACRUM/COCCYX (BONY) W WO CONTRAST: ICD-10-PCS | Mod: 26,,, | Performed by: RADIOLOGY

## 2022-07-15 PROCEDURE — A9585 GADOBUTROL INJECTION: HCPCS | Performed by: STUDENT IN AN ORGANIZED HEALTH CARE EDUCATION/TRAINING PROGRAM

## 2022-07-15 PROCEDURE — 25500020 PHARM REV CODE 255: Performed by: STUDENT IN AN ORGANIZED HEALTH CARE EDUCATION/TRAINING PROGRAM

## 2022-07-15 RX ORDER — GADOBUTROL 604.72 MG/ML
9 INJECTION INTRAVENOUS
Status: COMPLETED | OUTPATIENT
Start: 2022-07-15 | End: 2022-07-15

## 2022-07-15 RX ADMIN — GADOBUTROL 9 ML: 604.72 INJECTION INTRAVENOUS at 10:07

## 2022-08-04 DIAGNOSIS — U07.1 COVID-19: Primary | ICD-10-CM

## 2022-08-04 RX ORDER — PROMETHAZINE HYDROCHLORIDE AND DEXTROMETHORPHAN HYDROBROMIDE 6.25; 15 MG/5ML; MG/5ML
5 SYRUP ORAL EVERY 6 HOURS PRN
Qty: 180 ML | Refills: 1 | Status: CANCELLED | OUTPATIENT
Start: 2022-08-04

## 2022-08-04 RX ORDER — BENZONATATE 100 MG/1
100-200 CAPSULE ORAL 3 TIMES DAILY PRN
Qty: 50 CAPSULE | Refills: 0 | Status: SHIPPED | OUTPATIENT
Start: 2022-08-04 | End: 2022-08-18

## 2022-08-04 NOTE — TELEPHONE ENCOUNTER
Called and spoke with patient and he tested positive for Covid yesterday. They wouldn't give him anything for cough and he can't sleep and also coughing all day long. He would like to see if the cough medication that Dr. Brennan gave him before could be called in because he is out. The cough medication needs to be called into [facility] in [place].

## 2022-08-04 NOTE — TELEPHONE ENCOUNTER
----- Message from Marcelina Nicolas sent at 8/4/2022 11:20 AM CDT -----  Contact: [phone]  Pt has tested positive for covid today they were told to let the dr know please advise

## 2022-08-15 DIAGNOSIS — G44.219 EPISODIC TENSION-TYPE HEADACHE, NOT INTRACTABLE: ICD-10-CM

## 2022-08-15 RX ORDER — BUTALBITAL, ACETAMINOPHEN AND CAFFEINE 50; 325; 40 MG/1; MG/1; MG/1
1 TABLET ORAL EVERY 6 HOURS PRN
Qty: 30 TABLET | Refills: 3 | Status: SHIPPED | OUTPATIENT
Start: 2022-08-15 | End: 2022-11-08

## 2022-08-15 NOTE — TELEPHONE ENCOUNTER
No new care gaps identified.  [facility] Embedded Care Gaps. Reference number: [id number]. 8/15/2022   1:39:29 PM CDT

## 2022-08-15 NOTE — TELEPHONE ENCOUNTER
----- Message from Kristal MASSIEL Nicolas sent at 8/15/2022 12:36 PM CDT -----  Contact: pt [phone]  Requesting an RX refill or new RX.  Is this a refill or new RX: refill  RX name and strength: butalbital-acetaminophen-caffeine -40 mg (FIORICET, ESGIC) -40 mg per tablet  Is this a 30 day or 90 day RX: 30  Pharmacy name and phone #   [facility] #[id number] - [place] - [address] AT [facility] [address]  [address] [address]  Phone: [phone] Fax: [phone]    The doctors have asked that we provide their patients with the following 2 reminders -- prescription refills can take up to 72 hours, and a friendly reminder that in the future you can use your [facility] account to request refills: yes

## 2022-08-16 ENCOUNTER — NURSE TRIAGE (OUTPATIENT)
Dept: ADMINISTRATIVE | Facility: CLINIC | Age: 72
End: 2022-08-16
Payer: MEDICARE

## 2022-08-16 DIAGNOSIS — U07.1 COVID-19: Primary | ICD-10-CM

## 2022-08-16 NOTE — TELEPHONE ENCOUNTER
Was speaking with wife who states pt diagnosed with Covid on 8/4. Pt completed dose of Paxlovid, symptoms improved but now have rebounded. Pt currently having sore throat, coughing, HA and weakness. Pt has pulse ox and SpO2 99% and HR 60. Productive cough of clear sputum. Not continuous but cough had initially improved and now it is back but not as bad as initially when first had symptoms. Denies CP, SOB, no fever. Dispo is discuss with PCP and callback by nurse within 1 hour. Will send to PCP office for them to follow up with pt. Advised pt if he does not hear back from PCP office to go to  to be seen. Advised pt to continue with tessalon perles. Drink plenty of fluids to thin secretions, cough drops, hot fluids to relax airway, tylenol for body aches or HA. Advised to call back with further concerns.    Reason for Disposition   HIGH RISK for severe COVID complications (e.g., weak immune system, age > 64 years, obesity with BMI > 25, pregnant, chronic lung disease or other chronic medical condition) (Exception: Already seen by PCP and no new or worsening symptoms.)    Additional Information   Negative: SEVERE difficulty breathing (e.g., struggling for each breath, speaks in single words)   Negative: Difficult to awaken or acting confused (e.g., disoriented, slurred speech)   Negative: Bluish (or gray) lips or face now   Negative: Shock suspected (e.g., cold/pale/clammy skin, too weak to stand, low BP, rapid pulse)   Negative: Sounds like a life-threatening emergency to the triager   Negative: SEVERE or constant chest pain or pressure  (Exception: Mild central chest pain, present only when coughing.)   Negative: MODERATE difficulty breathing (e.g., speaks in phrases, SOB even at rest, pulse 100-120)   Negative: Headache and stiff neck (can't touch chin to chest)   Negative: Oxygen level (e.g., pulse oximetry) 90 percent or lower   Negative: Chest pain or pressure   Negative: Patient sounds very sick or  weak to the triager   Negative: MILD difficulty breathing (e.g., minimal/no SOB at rest, SOB with walking, pulse <100)   Negative: Fever > 103 F (39.4 C)   Negative: [1] Fever > 101 F (38.3 C) AND [2] over 60 years of age   Negative: [1] Fever > 100.0 F (37.8 C) AND [2] bedridden (e.g., nursing home patient, CVA, chronic illness, recovering from surgery)    Protocols used: CORONAVIRUS (COVID-19) DIAGNOSED OR [id number]

## 2022-08-18 DIAGNOSIS — U07.1 COVID-19: ICD-10-CM

## 2022-08-18 RX ORDER — BENZONATATE 100 MG/1
CAPSULE ORAL
Qty: 50 CAPSULE | Refills: 1 | Status: SHIPPED | OUTPATIENT
Start: 2022-08-18 | End: 2022-08-31

## 2022-08-31 ENCOUNTER — TELEPHONE (OUTPATIENT)
Dept: PRIMARY CARE CLINIC | Facility: CLINIC | Age: 72
End: 2022-08-31
Payer: MEDICARE

## 2022-08-31 DIAGNOSIS — U07.1 COVID-19 VIRUS INFECTION: Primary | ICD-10-CM

## 2022-08-31 RX ORDER — BENZONATATE 200 MG/1
200 CAPSULE ORAL 3 TIMES DAILY PRN
Qty: 30 CAPSULE | Refills: 1 | Status: SHIPPED | OUTPATIENT
Start: 2022-08-31 | End: 2022-11-18

## 2022-08-31 RX ORDER — PROMETHAZINE HYDROCHLORIDE AND DEXTROMETHORPHAN HYDROBROMIDE 6.25; 15 MG/5ML; MG/5ML
SYRUP ORAL
Qty: 180 ML | Refills: 1 | Status: SHIPPED | OUTPATIENT
Start: 2022-08-31 | End: 2022-11-08

## 2022-08-31 NOTE — TELEPHONE ENCOUNTER
----- Message from Winter Rawls sent at 8/31/2022  7:04 AM CDT -----  Contact: Gabby/Spouse [phone]  Patient had COVID at the beginning of August and is still battling a severe cough. Requesting a Xray and Rx.    [facility] #[id number] - [place] - [address] AT [facility] [address]  [address] [address]  Phone: [phone] Fax: [phone]    Please contact the patient with the status of this request.    Thank You

## 2022-08-31 NOTE — TELEPHONE ENCOUNTER
Pt stated he had COVID 4 weeks but still has a severe cough. Pt is requesting chest x-ray and a new prescription for cough. Please advise.

## 2022-09-02 ENCOUNTER — TELEPHONE (OUTPATIENT)
Dept: PULMONOLOGY | Facility: CLINIC | Age: 72
End: 2022-09-02
Payer: MEDICARE

## 2022-09-02 NOTE — TELEPHONE ENCOUNTER
----- Message from Jazmin Allen sent at 9/2/2022  9:00 AM CDT -----  Regarding: appt  Contact: pt  Pt is scheduled for 10-24 , would like to see a Dr today if possible , pt had covid and has developed a cough , pt cant even complete his sentence without coughing constantly . Please call       Confirmed patient's contact info below:  Contact Name: Bay Yadav  Phone Number: [phone]

## 2022-09-27 ENCOUNTER — PATIENT MESSAGE (OUTPATIENT)
Dept: PRIMARY CARE CLINIC | Facility: CLINIC | Age: 72
End: 2022-09-27
Payer: MEDICARE

## 2022-10-01 DIAGNOSIS — K21.9 GASTROESOPHAGEAL REFLUX DISEASE: ICD-10-CM

## 2022-10-01 RX ORDER — OMEPRAZOLE 40 MG/1
40 CAPSULE, DELAYED RELEASE ORAL DAILY
Qty: 90 CAPSULE | Refills: 2 | Status: SHIPPED | OUTPATIENT
Start: 2022-10-01 | End: 2023-05-19 | Stop reason: SDUPTHER

## 2022-10-01 NOTE — TELEPHONE ENCOUNTER
No new care gaps identified.  [facility] Embedded Care Gaps. Reference number: [id number]. 10/01/2022   3:54:36 AM CDT

## 2022-10-01 NOTE — TELEPHONE ENCOUNTER
Refill Decision Note   Bay Yadav  is requesting a refill authorization.  Brief Assessment and Rationale for Refill:  Approve     Medication Therapy Plan:       Medication Reconciliation Completed: No   Comments:     No Care Gaps recommended.     Note composed:3:39 PM 10/01/2022

## 2022-10-03 ENCOUNTER — TELEPHONE (OUTPATIENT)
Dept: PRIMARY CARE CLINIC | Facility: CLINIC | Age: 72
End: 2022-10-03
Payer: MEDICARE

## 2022-10-14 DIAGNOSIS — I10 ESSENTIAL HYPERTENSION, BENIGN: ICD-10-CM

## 2022-10-14 RX ORDER — LISINOPRIL 10 MG/1
TABLET ORAL
Qty: 90 TABLET | Refills: 1 | Status: SHIPPED | OUTPATIENT
Start: 2022-10-14 | End: 2023-04-07

## 2022-10-14 NOTE — TELEPHONE ENCOUNTER
No new care gaps identified.  [facility] Embedded Care Gaps. Reference number: [id number]. 10/14/2022   3:54:38 AM ASHLEYT

## 2022-10-14 NOTE — TELEPHONE ENCOUNTER
Refill Decision Note   Bay Yadav  is requesting a refill authorization.  Brief Assessment and Rationale for Refill:  Approve     Medication Therapy Plan:       Medication Reconciliation Completed: No   Comments:     No Care Gaps recommended.     Note composed:2:23 PM 10/14/2022

## 2022-11-04 ENCOUNTER — TELEPHONE (OUTPATIENT)
Dept: PRIMARY CARE CLINIC | Facility: CLINIC | Age: 72
End: 2022-11-04
Payer: MEDICARE

## 2022-11-04 NOTE — TELEPHONE ENCOUNTER
----- Message from Yuly Solomon sent at 11/4/2022 10:12 AM CDT -----  Contact: Patient, [phone]  Calling to get lab orders added to his chart. Please call him. Thanks.

## 2022-11-07 ENCOUNTER — TELEPHONE (OUTPATIENT)
Dept: PRIMARY CARE CLINIC | Facility: CLINIC | Age: 72
End: 2022-11-07
Payer: MEDICARE

## 2022-11-07 NOTE — TELEPHONE ENCOUNTER
Called for pt, no answer. LM on  to return call      Azathioprine Pregnancy And Lactation Text: This medication is Pregnancy Category D and isn't considered safe during pregnancy. It is unknown if this medication is excreted in breast milk.

## 2022-11-07 NOTE — TELEPHONE ENCOUNTER
----- Message from Chelsea Krishna sent at 11/7/2022 10:21 AM CST -----  Contact: Pt [phone]  Patient is calling about having blood work done before his appointment with Dr Brennan on 11/18/2022  Please schedule it for 11/11/2022 around 8:20am and advise.    Please call and advise.    Thank You

## 2022-11-18 ENCOUNTER — OFFICE VISIT (OUTPATIENT)
Dept: PRIMARY CARE CLINIC | Facility: CLINIC | Age: 72
End: 2022-11-18
Payer: MEDICARE

## 2022-11-18 VITALS
WEIGHT: 186.94 LBS | RESPIRATION RATE: 18 BRPM | HEIGHT: 67 IN | TEMPERATURE: 98 F | OXYGEN SATURATION: 98 % | SYSTOLIC BLOOD PRESSURE: 118 MMHG | DIASTOLIC BLOOD PRESSURE: 64 MMHG | BODY MASS INDEX: 29.34 KG/M2 | HEART RATE: 73 BPM

## 2022-11-18 DIAGNOSIS — E78.2 MIXED HYPERLIPIDEMIA: ICD-10-CM

## 2022-11-18 DIAGNOSIS — Z23 NEED FOR VACCINATION: ICD-10-CM

## 2022-11-18 DIAGNOSIS — M51.36 DDD (DEGENERATIVE DISC DISEASE), LUMBAR: Primary | ICD-10-CM

## 2022-11-18 DIAGNOSIS — R73.03 BORDERLINE DIABETES: ICD-10-CM

## 2022-11-18 DIAGNOSIS — I10 ESSENTIAL HYPERTENSION, BENIGN: ICD-10-CM

## 2022-11-18 DIAGNOSIS — N18.31 STAGE 3A CHRONIC KIDNEY DISEASE: ICD-10-CM

## 2022-11-18 PROCEDURE — 1160F RVW MEDS BY RX/DR IN RCRD: CPT | Mod: CPTII,S$GLB,, | Performed by: FAMILY MEDICINE

## 2022-11-18 PROCEDURE — 90732 PPSV23 VACC 2 YRS+ SUBQ/IM: CPT | Mod: S$GLB,,, | Performed by: FAMILY MEDICINE

## 2022-11-18 PROCEDURE — 1160F PR REVIEW ALL MEDS BY PRESCRIBER/CLIN PHARMACIST DOCUMENTED: ICD-10-PCS | Mod: CPTII,S$GLB,, | Performed by: FAMILY MEDICINE

## 2022-11-18 PROCEDURE — G0009 PNEUMOCOCCAL POLYSACCHARIDE VACCINE 23-VALENT =>2YO SQ IM: ICD-10-PCS | Mod: S$GLB,,, | Performed by: FAMILY MEDICINE

## 2022-11-18 PROCEDURE — 3288F PR FALLS RISK ASSESSMENT DOCUMENTED: ICD-10-PCS | Mod: CPTII,S$GLB,, | Performed by: FAMILY MEDICINE

## 2022-11-18 PROCEDURE — 90732 PNEUMOCOCCAL POLYSACCHARIDE VACCINE 23-VALENT =>2YO SQ IM: ICD-10-PCS | Mod: S$GLB,,, | Performed by: FAMILY MEDICINE

## 2022-11-18 PROCEDURE — 99214 PR OFFICE/OUTPT VISIT, EST, LEVL IV, 30-39 MIN: ICD-10-PCS | Mod: S$GLB,,, | Performed by: FAMILY MEDICINE

## 2022-11-18 PROCEDURE — 99999 PR PBB SHADOW E&M-EST. PATIENT-LVL IV: ICD-10-PCS | Mod: PBBFAC,,, | Performed by: FAMILY MEDICINE

## 2022-11-18 PROCEDURE — 3008F BODY MASS INDEX DOCD: CPT | Mod: CPTII,S$GLB,, | Performed by: FAMILY MEDICINE

## 2022-11-18 PROCEDURE — 3044F HG A1C LEVEL LT 7.0%: CPT | Mod: CPTII,S$GLB,, | Performed by: FAMILY MEDICINE

## 2022-11-18 PROCEDURE — 1125F AMNT PAIN NOTED PAIN PRSNT: CPT | Mod: CPTII,S$GLB,, | Performed by: FAMILY MEDICINE

## 2022-11-18 PROCEDURE — 99214 OFFICE O/P EST MOD 30 MIN: CPT | Mod: S$GLB,,, | Performed by: FAMILY MEDICINE

## 2022-11-18 PROCEDURE — 3078F DIAST BP <80 MM HG: CPT | Mod: CPTII,S$GLB,, | Performed by: FAMILY MEDICINE

## 2022-11-18 PROCEDURE — 3074F SYST BP LT 130 MM HG: CPT | Mod: CPTII,S$GLB,, | Performed by: FAMILY MEDICINE

## 2022-11-18 PROCEDURE — 1101F PT FALLS ASSESS-DOCD LE1/YR: CPT | Mod: CPTII,S$GLB,, | Performed by: FAMILY MEDICINE

## 2022-11-18 PROCEDURE — 4010F PR ACE/ARB THEARPY RXD/TAKEN: ICD-10-PCS | Mod: CPTII,S$GLB,, | Performed by: FAMILY MEDICINE

## 2022-11-18 PROCEDURE — 3008F PR BODY MASS INDEX (BMI) DOCUMENTED: ICD-10-PCS | Mod: CPTII,S$GLB,, | Performed by: FAMILY MEDICINE

## 2022-11-18 PROCEDURE — G0009 ADMIN PNEUMOCOCCAL VACCINE: HCPCS | Mod: S$GLB,,, | Performed by: FAMILY MEDICINE

## 2022-11-18 PROCEDURE — 1159F PR MEDICATION LIST DOCUMENTED IN MEDICAL RECORD: ICD-10-PCS | Mod: CPTII,S$GLB,, | Performed by: FAMILY MEDICINE

## 2022-11-18 PROCEDURE — 3074F PR MOST RECENT SYSTOLIC BLOOD PRESSURE < 130 MM HG: ICD-10-PCS | Mod: CPTII,S$GLB,, | Performed by: FAMILY MEDICINE

## 2022-11-18 PROCEDURE — 3288F FALL RISK ASSESSMENT DOCD: CPT | Mod: CPTII,S$GLB,, | Performed by: FAMILY MEDICINE

## 2022-11-18 PROCEDURE — 3044F PR MOST RECENT HEMOGLOBIN A1C LEVEL <7.0%: ICD-10-PCS | Mod: CPTII,S$GLB,, | Performed by: FAMILY MEDICINE

## 2022-11-18 PROCEDURE — 1159F MED LIST DOCD IN RCRD: CPT | Mod: CPTII,S$GLB,, | Performed by: FAMILY MEDICINE

## 2022-11-18 PROCEDURE — 1101F PR PT FALLS ASSESS DOC 0-1 FALLS W/OUT INJ PAST YR: ICD-10-PCS | Mod: CPTII,S$GLB,, | Performed by: FAMILY MEDICINE

## 2022-11-18 PROCEDURE — 1125F PR PAIN SEVERITY QUANTIFIED, PAIN PRESENT: ICD-10-PCS | Mod: CPTII,S$GLB,, | Performed by: FAMILY MEDICINE

## 2022-11-18 PROCEDURE — 3078F PR MOST RECENT DIASTOLIC BLOOD PRESSURE < 80 MM HG: ICD-10-PCS | Mod: CPTII,S$GLB,, | Performed by: FAMILY MEDICINE

## 2022-11-18 PROCEDURE — 4010F ACE/ARB THERAPY RXD/TAKEN: CPT | Mod: CPTII,S$GLB,, | Performed by: FAMILY MEDICINE

## 2022-11-18 PROCEDURE — 99999 PR PBB SHADOW E&M-EST. PATIENT-LVL IV: CPT | Mod: PBBFAC,,, | Performed by: FAMILY MEDICINE

## 2022-11-18 RX ORDER — ZOSTER VACCINE RECOMBINANT, ADJUVANTED 50 MCG/0.5
0.5 KIT INTRAMUSCULAR ONCE
Qty: 1 EACH | Refills: 1 | Status: SHIPPED | OUTPATIENT
Start: 2022-11-18 | End: 2022-11-18

## 2022-11-18 RX ORDER — BACLOFEN 10 MG/1
10 TABLET ORAL
Qty: 60 TABLET | Refills: 2 | Status: SHIPPED | OUTPATIENT
Start: 2022-11-18 | End: 2023-03-01

## 2022-11-18 RX ORDER — LEVALBUTEROL INHALATION SOLUTION 1.25 MG/3ML
SOLUTION RESPIRATORY (INHALATION)
COMMUNITY
Start: 2022-09-23 | End: 2023-05-19

## 2022-11-18 NOTE — PROGRESS NOTES
Verified pt by name and . Allergies verified by pt. Per physician orders pt was administered Pneumovax 23 0.5 mL IM to left deltoid  using aseptic technique. Pt tolerated well. No adverse effects or pain reported. MD notified.

## 2022-11-18 NOTE — PROGRESS NOTES
"Subjective:       Patient ID: Bay Yadav is a 71 y.o. male.    Chief Complaint: Follow-up (Pt in for a follow-up)    Chronic low back pain, requesting prescription for baclofen, which he taken in the past, says this helped.  No recent falls or injury.  No chest pain or shortness of breath.  PSA undetectable on most recent labs, renal function stable.    Review of Systems   Constitutional:  Negative for fever.   Respiratory:  Negative for shortness of breath.    Cardiovascular:  Negative for chest pain.   Musculoskeletal:  Positive for arthralgias and back pain.   Psychiatric/Behavioral:  Negative for agitation and confusion.      Objective:      Vitals:    11/18/22 0915   BP: 118/64   BP Location: Right arm   Patient Position: Sitting   BP Method: Medium (Manual)   Pulse: 73   Resp: 18   Temp: 98.2 °F (36.8 °C)   TempSrc: Temporal   SpO2: 98%   Weight: 84.8 kg (186 lb 15.2 oz)   Height: 5' 7" (1.702 m)     Physical Exam  Vitals and nursing note reviewed.   Constitutional:       General: He is not in acute distress.     Appearance: Normal appearance. He is well-developed.   HENT:      Head: Normocephalic and atraumatic.   Cardiovascular:      Rate and Rhythm: Normal rate and regular rhythm.      Heart sounds: Normal heart sounds.   Pulmonary:      Effort: Pulmonary effort is normal.      Breath sounds: Normal breath sounds.   Musculoskeletal:      Right lower leg: No edema.      Left lower leg: No edema.   Skin:     General: Skin is warm and dry.   Neurological:      Mental Status: He is alert and oriented to person, place, and time.   Psychiatric:         Mood and Affect: Mood normal.         Behavior: Behavior normal.       Lab Results   Component Value Date    WBC 6.02 05/13/2022    HGB 13.8 (L) 05/13/2022    HCT 42.5 05/13/2022     05/13/2022    CHOL 161 05/13/2022    TRIG 120 05/13/2022    HDL 56 05/13/2022    ALT 28 05/13/2022    AST 23 05/13/2022     11/11/2022    K 3.8 11/11/2022    CL " 103 2022    CREATININE 1.2 2022    BUN 18 2022    CO2 26 2022    INR 1.0 2006    HGBA1C 5.4 2022      Assessment:       1. DDD (degenerative disc disease), lumbar    2. Need for vaccination    3. Stage 3a chronic kidney disease    4. Mixed hyperlipidemia    5. Essential hypertension, benign    6. Borderline diabetes          Plan:       DDD (degenerative disc disease), lumbar  -     baclofen (LIORESAL) 10 MG tablet; Take 1 tablet (10 mg total) by mouth before meals as needed (spasms).  Dispense: 60 tablet; Refill: 2    Need for vaccination  -     Pneumococcal Polysaccharide Vaccine (23 Valent) (SQ/IM)  -     varicella-zoster gE-AS01B, PF, (SHINGRIX, PF,) 50 mcg/0.5 mL injection; Inject 0.5 mLs into the muscle once. for 1 dose  Dispense: 1 each; Refill: 1    Stage 3a chronic kidney disease  -     CBC Auto Differential; Future; Expected date: 2023  Stable, avoid nephrotoxins  Mixed hyperlipidemia  -     Comprehensive Metabolic Panel; Future; Expected date: 2023  -     Lipid Panel; Future; Expected date: 2023    Essential hypertension, benign  Well controlled  Borderline diabetes  -     Hemoglobin A1C; Future; Expected date: 2023  Low carb diet, exercise    Medication List with Changes/Refills   New Medications    BACLOFEN (LIORESAL) 10 MG TABLET    Take 1 tablet (10 mg total) by mouth before meals as needed (spasms).    VARICELLA-ZOSTER GE-AS01B, PF, (SHINGRIX, PF,) 50 MCG/0.5 ML INJECTION    Inject 0.5 mLs into the muscle once. for 1 dose   Current Medications    BUTALBITAL-ACETAMINOPHEN-CAFFEINE -40 MG (FIORICET, ESGIC) -40 MG PER TABLET    TAKE 1 TABLET BY MOUTH EVERY 6 HOURS AS NEEDED FOR HEADACHE    ESCITALOPRAM OXALATE (LEXAPRO) 10 MG TABLET    Take 1 tablet (10 mg total) by mouth once daily.    LEVALBUTEROL (XOPENEX) 1.25 MG/3 ML NEBULIZER SOLUTION    SMARTSI Vial(s) Via Nebulizer 4 Times Daily PRN    LISINOPRIL 10 MG TABLET    TAKE 1  TABLET(10 MG) BY MOUTH EVERY DAY    MECLIZINE (ANTIVERT) 25 MG TABLET    TAKE 1 TABLET(25 MG) BY MOUTH THREE TIMES DAILY AS NEEDED    MULTIVITAMIN WITH MINERALS TABLET    Take 1 tablet by mouth once daily.    OMEPRAZOLE (PRILOSEC) 40 MG CAPSULE    Take 1 capsule (40 mg total) by mouth once daily.    PROMETHAZINE (PHENERGAN) 25 MG TABLET    TAKE 1 TABLET(25 MG) BY MOUTH EVERY 6 HOURS AS NEEDED FOR NAUSEA    PROMETHAZINE-DEXTROMETHORPHAN (PROMETHAZINE-DM) 6.25-15 MG/5 ML SYRP    TAKE 5 ML BY MOUTH EVERY 6 HOURS AS NEEDED FOR COUGH    ROSUVASTATIN (CRESTOR) 20 MG TABLET    Take 1 tablet (20 mg total) by mouth once daily.    TRAMADOL (ULTRAM) 50 MG TABLET    Take 1 tablet (50 mg total) by mouth every 6 (six) hours as needed.   Discontinued Medications    BENZONATATE (TESSALON) 200 MG CAPSULE    Take 1 capsule (200 mg total) by mouth 3 (three) times daily as needed for Cough.    GABAPENTIN (NEURONTIN) 300 MG CAPSULE    Take 1 capsule (300 mg total) by mouth every evening.

## 2023-02-10 ENCOUNTER — TELEPHONE (OUTPATIENT)
Dept: PRIMARY CARE CLINIC | Facility: CLINIC | Age: 73
End: 2023-02-10
Payer: MEDICARE

## 2023-02-10 DIAGNOSIS — R30.0 DYSURIA: Primary | ICD-10-CM

## 2023-02-10 NOTE — TELEPHONE ENCOUNTER
Called pt regarding message. Pt requested a UA for possible UTI. Pt reported dysuria, bladder pressure, and frequent urination. Pt stated the symptoms started 5 days ago.

## 2023-02-10 NOTE — TELEPHONE ENCOUNTER
Urinalysis is completely normal.  There is no evidence of infection.  Increase fluid intake.  If symptoms persist, he should contact his urologist.

## 2023-02-10 NOTE — TELEPHONE ENCOUNTER
----- Message from James Avalos sent at 2/10/2023 12:00 PM CST -----  Contact: [phone]  Pt wife Gabby said she needs a call back about her  going to the bathroom a lot and to see when he can get the urin test done. Please call pt wife back.

## 2023-02-10 NOTE — TELEPHONE ENCOUNTER
Called wife regarding message. Informed wife this message has been routed to provider already. Wife verbalized understanding.

## 2023-02-10 NOTE — TELEPHONE ENCOUNTER
----- Message from Yuly Solomon sent at 2/10/2023  8:23 AM CST -----  Contact: Wife, Gabby, [phone]  Calling to request an order for a urinalyse due to pressure urinating. Please advise. Thanks.

## 2023-03-13 DIAGNOSIS — F32.A DEPRESSION, UNSPECIFIED DEPRESSION TYPE: ICD-10-CM

## 2023-03-13 RX ORDER — GENTAMICIN SULFATE 3 MG/ML
2 SOLUTION/ DROPS OPHTHALMIC 4 TIMES DAILY
Qty: 5 ML | Refills: 0 | Status: SHIPPED | OUTPATIENT
Start: 2023-03-13 | End: 2023-03-20

## 2023-03-13 RX ORDER — ESCITALOPRAM OXALATE 10 MG/1
10 TABLET ORAL DAILY
Qty: 90 TABLET | Refills: 3 | Status: SHIPPED | OUTPATIENT
Start: 2023-03-13 | End: 2023-05-19 | Stop reason: SDUPTHER

## 2023-03-13 NOTE — TELEPHONE ENCOUNTER
----- Message from Yuly Neha sent at 3/13/2023  8:36 AM CDT -----  Contact: Patient, [phone]  Requesting an RX refill or new RX.  Is this a refill or new RX: Refill  RX name and strength (copy/paste from chart):  EScitalopram oxalate (LEXAPRO) 10 MG tablet  Is this a 30 day or 90 day RX:   Pharmacy name and phone # (copy/paste from chart):    [facility] #[id number] - [place] - [address] AT [facility] [address] [address]  Phone: [phone] Fax: [phone]  The doctors have asked that we provide their patients with the following 2 reminders -- prescription refills can take up to 72 hours, and a friendly reminder that in the future you can use your [facility] account to request refills: Yes

## 2023-03-13 NOTE — TELEPHONE ENCOUNTER
No new care gaps identified.  [facility] Embedded Care Gaps. Reference number: [id number]. 3/13/2023   9:15:47 AM ASHLEYT

## 2023-03-13 NOTE — TELEPHONE ENCOUNTER
----- Message from Yuly Solomon sent at 3/13/2023  8:38 AM CDT -----  Contact: Patient,. [phone]  Calling because he caught pink eye from his grandson, requesting medication. Please advise.thanks.

## 2023-03-13 NOTE — TELEPHONE ENCOUNTER
Called pt regarding message. Pt stated he got pink eye from grandson. Pt stated his eye is red in color, crusting, and swollen. Pt stated it painful to open his eye. Pt stated symptoms started 2 days ago. Pt is requesting prescription for eye.

## 2023-03-14 NOTE — TELEPHONE ENCOUNTER
Lexapro refilled, and gentamicin eyedrops sent to the pharmacy.  Needs to see Optometry if no improved

## 2023-04-18 DIAGNOSIS — Z90.79 HISTORY OF ROBOT-ASSISTED LAPAROSCOPIC RADICAL PROSTATECTOMY: ICD-10-CM

## 2023-04-18 DIAGNOSIS — C61 PROSTATE CANCER: Primary | ICD-10-CM

## 2023-05-19 ENCOUNTER — OFFICE VISIT (OUTPATIENT)
Dept: PRIMARY CARE CLINIC | Facility: CLINIC | Age: 73
End: 2023-05-19
Payer: MEDICARE

## 2023-05-19 VITALS
BODY MASS INDEX: 28.88 KG/M2 | HEART RATE: 76 BPM | HEIGHT: 67 IN | OXYGEN SATURATION: 96 % | DIASTOLIC BLOOD PRESSURE: 72 MMHG | TEMPERATURE: 98 F | WEIGHT: 184 LBS | SYSTOLIC BLOOD PRESSURE: 132 MMHG | RESPIRATION RATE: 18 BRPM

## 2023-05-19 DIAGNOSIS — I10 ESSENTIAL HYPERTENSION, BENIGN: ICD-10-CM

## 2023-05-19 DIAGNOSIS — M51.36 DDD (DEGENERATIVE DISC DISEASE), LUMBAR: ICD-10-CM

## 2023-05-19 DIAGNOSIS — I70.0 ATHEROSCLEROSIS OF ABDOMINAL AORTA: ICD-10-CM

## 2023-05-19 DIAGNOSIS — G47.00 INSOMNIA, UNSPECIFIED TYPE: Primary | ICD-10-CM

## 2023-05-19 DIAGNOSIS — E78.2 MIXED HYPERLIPIDEMIA: ICD-10-CM

## 2023-05-19 DIAGNOSIS — Z85.46 HISTORY OF PROSTATE CANCER: ICD-10-CM

## 2023-05-19 DIAGNOSIS — K21.9 GASTROESOPHAGEAL REFLUX DISEASE: ICD-10-CM

## 2023-05-19 DIAGNOSIS — N18.31 STAGE 3A CHRONIC KIDNEY DISEASE: ICD-10-CM

## 2023-05-19 DIAGNOSIS — F32.4 MAJOR DEPRESSIVE DISORDER WITH SINGLE EPISODE, IN PARTIAL REMISSION: ICD-10-CM

## 2023-05-19 DIAGNOSIS — G44.219 EPISODIC TENSION-TYPE HEADACHE, NOT INTRACTABLE: ICD-10-CM

## 2023-05-19 PROCEDURE — 1159F PR MEDICATION LIST DOCUMENTED IN MEDICAL RECORD: ICD-10-PCS | Mod: CPTII,,, | Performed by: FAMILY MEDICINE

## 2023-05-19 PROCEDURE — 99214 PR OFFICE/OUTPT VISIT, EST, LEVL IV, 30-39 MIN: ICD-10-PCS | Mod: ,,, | Performed by: FAMILY MEDICINE

## 2023-05-19 PROCEDURE — 3078F PR MOST RECENT DIASTOLIC BLOOD PRESSURE < 80 MM HG: ICD-10-PCS | Mod: CPTII,,, | Performed by: FAMILY MEDICINE

## 2023-05-19 PROCEDURE — 99999 PR PBB SHADOW E&M-EST. PATIENT-LVL IV: ICD-10-PCS | Mod: PBBFAC,,, | Performed by: FAMILY MEDICINE

## 2023-05-19 PROCEDURE — 3008F BODY MASS INDEX DOCD: CPT | Mod: CPTII,,, | Performed by: FAMILY MEDICINE

## 2023-05-19 PROCEDURE — 1101F PR PT FALLS ASSESS DOC 0-1 FALLS W/OUT INJ PAST YR: ICD-10-PCS | Mod: CPTII,,, | Performed by: FAMILY MEDICINE

## 2023-05-19 PROCEDURE — 1125F AMNT PAIN NOTED PAIN PRSNT: CPT | Mod: CPTII,,, | Performed by: FAMILY MEDICINE

## 2023-05-19 PROCEDURE — 4010F ACE/ARB THERAPY RXD/TAKEN: CPT | Mod: CPTII,,, | Performed by: FAMILY MEDICINE

## 2023-05-19 PROCEDURE — 1160F RVW MEDS BY RX/DR IN RCRD: CPT | Mod: CPTII,,, | Performed by: FAMILY MEDICINE

## 2023-05-19 PROCEDURE — 3078F DIAST BP <80 MM HG: CPT | Mod: CPTII,,, | Performed by: FAMILY MEDICINE

## 2023-05-19 PROCEDURE — 3044F HG A1C LEVEL LT 7.0%: CPT | Mod: CPTII,,, | Performed by: FAMILY MEDICINE

## 2023-05-19 PROCEDURE — 4010F PR ACE/ARB THEARPY RXD/TAKEN: ICD-10-PCS | Mod: CPTII,,, | Performed by: FAMILY MEDICINE

## 2023-05-19 PROCEDURE — 3075F PR MOST RECENT SYSTOLIC BLOOD PRESS GE 130-139MM HG: ICD-10-PCS | Mod: CPTII,,, | Performed by: FAMILY MEDICINE

## 2023-05-19 PROCEDURE — 99214 OFFICE O/P EST MOD 30 MIN: CPT | Mod: ,,, | Performed by: FAMILY MEDICINE

## 2023-05-19 PROCEDURE — 3075F SYST BP GE 130 - 139MM HG: CPT | Mod: CPTII,,, | Performed by: FAMILY MEDICINE

## 2023-05-19 PROCEDURE — 3008F PR BODY MASS INDEX (BMI) DOCUMENTED: ICD-10-PCS | Mod: CPTII,,, | Performed by: FAMILY MEDICINE

## 2023-05-19 PROCEDURE — 1101F PT FALLS ASSESS-DOCD LE1/YR: CPT | Mod: CPTII,,, | Performed by: FAMILY MEDICINE

## 2023-05-19 PROCEDURE — 1159F MED LIST DOCD IN RCRD: CPT | Mod: CPTII,,, | Performed by: FAMILY MEDICINE

## 2023-05-19 PROCEDURE — 3288F PR FALLS RISK ASSESSMENT DOCUMENTED: ICD-10-PCS | Mod: CPTII,,, | Performed by: FAMILY MEDICINE

## 2023-05-19 PROCEDURE — 1160F PR REVIEW ALL MEDS BY PRESCRIBER/CLIN PHARMACIST DOCUMENTED: ICD-10-PCS | Mod: CPTII,,, | Performed by: FAMILY MEDICINE

## 2023-05-19 PROCEDURE — 99999 PR PBB SHADOW E&M-EST. PATIENT-LVL IV: CPT | Mod: PBBFAC,,, | Performed by: FAMILY MEDICINE

## 2023-05-19 PROCEDURE — 1125F PR PAIN SEVERITY QUANTIFIED, PAIN PRESENT: ICD-10-PCS | Mod: CPTII,,, | Performed by: FAMILY MEDICINE

## 2023-05-19 PROCEDURE — 3044F PR MOST RECENT HEMOGLOBIN A1C LEVEL <7.0%: ICD-10-PCS | Mod: CPTII,,, | Performed by: FAMILY MEDICINE

## 2023-05-19 PROCEDURE — 3288F FALL RISK ASSESSMENT DOCD: CPT | Mod: CPTII,,, | Performed by: FAMILY MEDICINE

## 2023-05-19 PROCEDURE — 99214 OFFICE O/P EST MOD 30 MIN: CPT | Mod: PN | Performed by: FAMILY MEDICINE

## 2023-05-19 RX ORDER — ESCITALOPRAM OXALATE 10 MG/1
10 TABLET ORAL DAILY
Qty: 90 TABLET | Refills: 3 | Status: SHIPPED | OUTPATIENT
Start: 2023-05-19

## 2023-05-19 RX ORDER — BACLOFEN 10 MG/1
10 TABLET ORAL 2 TIMES DAILY
Qty: 180 TABLET | Refills: 3 | Status: SHIPPED | OUTPATIENT
Start: 2023-05-19

## 2023-05-19 RX ORDER — OMEPRAZOLE 40 MG/1
40 CAPSULE, DELAYED RELEASE ORAL DAILY
Qty: 90 CAPSULE | Refills: 3 | Status: SHIPPED | OUTPATIENT
Start: 2023-05-19 | End: 2023-07-21

## 2023-05-19 RX ORDER — TRAMADOL HYDROCHLORIDE 50 MG/1
50 TABLET ORAL EVERY 6 HOURS PRN
Qty: 120 TABLET | Refills: 3 | OUTPATIENT
Start: 2023-05-19

## 2023-05-19 RX ORDER — TRAMADOL HYDROCHLORIDE 50 MG/1
TABLET ORAL
Qty: 120 TABLET | OUTPATIENT
Start: 2023-05-19

## 2023-05-19 RX ORDER — TRAMADOL HYDROCHLORIDE 50 MG/1
TABLET ORAL
Qty: 120 TABLET | Refills: 5 | Status: SHIPPED | OUTPATIENT
Start: 2023-05-19 | End: 2023-11-13

## 2023-05-19 RX ORDER — BUTALBITAL, ACETAMINOPHEN AND CAFFEINE 50; 325; 40 MG/1; MG/1; MG/1
TABLET ORAL
Qty: 30 TABLET | Refills: 5 | Status: SHIPPED | OUTPATIENT
Start: 2023-05-19 | End: 2023-10-19

## 2023-05-19 RX ORDER — BACLOFEN 10 MG/1
TABLET ORAL
Qty: 60 TABLET | Refills: 2 | Status: CANCELLED | OUTPATIENT
Start: 2023-05-19

## 2023-05-19 RX ORDER — TRAZODONE HYDROCHLORIDE 50 MG/1
50-100 TABLET ORAL NIGHTLY PRN
Qty: 60 TABLET | Refills: 5 | Status: SHIPPED | OUTPATIENT
Start: 2023-05-19 | End: 2023-12-08

## 2023-05-19 RX ORDER — LISINOPRIL 10 MG/1
10 TABLET ORAL DAILY
Qty: 90 TABLET | Refills: 3 | Status: SHIPPED | OUTPATIENT
Start: 2023-05-19

## 2023-05-19 RX ORDER — ROSUVASTATIN CALCIUM 20 MG/1
20 TABLET, COATED ORAL DAILY
Qty: 90 TABLET | Refills: 3 | Status: SHIPPED | OUTPATIENT
Start: 2023-05-19

## 2023-05-19 NOTE — TELEPHONE ENCOUNTER
No care due was identified.  [facility] Embedded Care Due Messages. Reference number: [id number].   5/19/2023 11:08:26 AM CDT

## 2023-05-19 NOTE — TELEPHONE ENCOUNTER
Called pt regarding prescription request. Prescription was approved, pt verbalized understanding.

## 2023-05-19 NOTE — TELEPHONE ENCOUNTER
No care due was identified.  [facility] Embedded Care Due Messages. Reference number: [id number].   5/19/2023 1:05:42 AM CDT

## 2023-05-19 NOTE — PROGRESS NOTES
"Subjective:       Patient ID: Bay Yadav is a 72 y.o. male.    Chief Complaint: Follow-up (6 month follow up)    Here for regular checkup.  Recently fell out of bed while sleeping, injured his ribs, slowly improving.  No shortness of breath.  Chronic lower back pain, stable on current regimen.  Also reports trouble sleeping for the past few months.  Used to take Ambien, would like something non habit-forming to try    Review of Systems   Respiratory:  Negative for shortness of breath.    Cardiovascular:  Negative for chest pain.   Musculoskeletal:  Positive for back pain.   Psychiatric/Behavioral:  Positive for sleep disturbance.      Objective:      Vitals:    05/19/23 1049   BP: 132/72   BP Location: Left arm   Patient Position: Sitting   BP Method: Medium (Manual)   Pulse: 76   Resp: 18   Temp: 97.6 °F (36.4 °C)   TempSrc: Temporal   SpO2: 96%   Weight: 83.4 kg (183 lb 15.6 oz)   Height: 5' 7" (1.702 m)     Physical Exam  Vitals and nursing note reviewed.   Constitutional:       General: He is not in acute distress.     Appearance: Normal appearance. He is well-developed.   HENT:      Head: Normocephalic and atraumatic.   Cardiovascular:      Rate and Rhythm: Normal rate and regular rhythm.      Heart sounds: Normal heart sounds.   Pulmonary:      Effort: Pulmonary effort is normal.      Breath sounds: Normal breath sounds.   Musculoskeletal:      Right lower leg: No edema.      Left lower leg: No edema.   Skin:     General: Skin is warm and dry.   Neurological:      Mental Status: He is alert and oriented to person, place, and time.   Psychiatric:         Mood and Affect: Mood normal.         Behavior: Behavior normal.       Lab Results   Component Value Date    WBC 6.42 05/12/2023    HGB 13.7 (L) 05/12/2023    HCT 41.7 05/12/2023     05/12/2023    CHOL 169 05/12/2023    TRIG 156 (H) 05/12/2023    HDL 59 05/12/2023    ALT 30 05/12/2023    AST 19 05/12/2023     05/12/2023    K 3.9 05/12/2023 "     05/12/2023    CREATININE 1.2 05/12/2023    BUN 14 05/12/2023    CO2 25 05/12/2023    INR 1.0 07/21/2006    HGBA1C 5.6 05/12/2023      Assessment:       1. Insomnia, unspecified type    2. Stage 3a chronic kidney disease    3. Mixed hyperlipidemia    4. Essential hypertension, benign    5. Atherosclerosis of abdominal aorta    6. Major depressive disorder with single episode, in partial remission    7. History of prostate cancer    8. DDD (degenerative disc disease), lumbar    9. Gastroesophageal reflux disease    10. Mixed hyperlipidemia        Plan:       Insomnia, unspecified type  -     traZODone (DESYREL) 50 MG tablet; Take 1-2 tablets ( mg total) by mouth nightly as needed for Insomnia.  Dispense: 60 tablet; Refill: 5    Stage 3a chronic kidney disease  -     Renal Function Panel; Future; Expected date: 11/19/2023  Stable, avoid nephrotoxins  Mixed hyperlipidemia  -     rosuvastatin (CRESTOR) 20 MG tablet; Take 1 tablet (20 mg total) by mouth once daily.  Dispense: 90 tablet; Refill: 3  Well controlled  Essential hypertension, benign  -     lisinopriL 10 MG tablet; Take 1 tablet (10 mg total) by mouth once daily.  Dispense: 90 tablet; Refill: 3  Continue current meds  Atherosclerosis of abdominal aorta  Continue current medications  Major depressive disorder with single episode, in partial remission  -     EScitalopram oxalate (LEXAPRO) 10 MG tablet; Take 1 tablet (10 mg total) by mouth once daily.  Dispense: 90 tablet; Refill: 3  Stable on current regimen  History of prostate cancer  PSA undetectable  DDD (degenerative disc disease), lumbar  -     traMADoL (ULTRAM) 50 mg tablet; TAKE 1 TABLET BY MOUTH EVERY 6 HOURS AS NEEDED  Dispense: 120 tablet; Refill: 5  -     baclofen (LIORESAL) 10 MG tablet; Take 1 tablet (10 mg total) by mouth 2 (two) times daily.  Dispense: 180 tablet; Refill: 3   reviewed, no worrisome findings    Gastroesophageal reflux disease  -     omeprazole (PRILOSEC) 40 MG  capsule; Take 1 capsule (40 mg total) by mouth once daily.  Dispense: 90 capsule; Refill: 3    Mixed hyperlipidemia  Orders:  -     rosuvastatin (CRESTOR) 20 MG tablet; Take 1 tablet (20 mg total) by mouth once daily.  Dispense: 90 tablet; Refill: 3      Medication List with Changes/Refills   New Medications    TRAZODONE (DESYREL) 50 MG TABLET    Take 1-2 tablets ( mg total) by mouth nightly as needed for Insomnia.   Current Medications    BUTALBITAL-ACETAMINOPHEN-CAFFEINE -40 MG (FIORICET, ESGIC) -40 MG PER TABLET    TAKE 1 TABLET BY MOUTH EVERY 6 HOURS AS NEEDED FOR HEADACHE    MECLIZINE (ANTIVERT) 25 MG TABLET    TAKE 1 TABLET(25 MG) BY MOUTH THREE TIMES DAILY AS NEEDED    MULTIVITAMIN WITH MINERALS TABLET    Take 1 tablet by mouth once daily.    PROMETHAZINE (PHENERGAN) 25 MG TABLET    TAKE 1 TABLET(25 MG) BY MOUTH EVERY 6 HOURS AS NEEDED FOR NAUSEA   Changed and/or Refilled Medications    Modified Medication Previous Medication    BACLOFEN (LIORESAL) 10 MG TABLET baclofen (LIORESAL) 10 MG tablet       Take 1 tablet (10 mg total) by mouth 2 (two) times daily.    TAKE 1 TABLET(10 MG) BY MOUTH BEFORE MEALS AS NEEDED FOR SPASMS    ESCITALOPRAM OXALATE (LEXAPRO) 10 MG TABLET EScitalopram oxalate (LEXAPRO) 10 MG tablet       Take 1 tablet (10 mg total) by mouth once daily.    Take 1 tablet (10 mg total) by mouth once daily.    LISINOPRIL 10 MG TABLET lisinopriL 10 MG tablet       Take 1 tablet (10 mg total) by mouth once daily.    TAKE 1 TABLET(10 MG) BY MOUTH EVERY DAY    OMEPRAZOLE (PRILOSEC) 40 MG CAPSULE omeprazole (PRILOSEC) 40 MG capsule       Take 1 capsule (40 mg total) by mouth once daily.    Take 1 capsule (40 mg total) by mouth once daily.    ROSUVASTATIN (CRESTOR) 20 MG TABLET rosuvastatin (CRESTOR) 20 MG tablet       Take 1 tablet (20 mg total) by mouth once daily.    TAKE 1 TABLET(20 MG) BY MOUTH EVERY DAY    TRAMADOL (ULTRAM) 50 MG TABLET traMADoL (ULTRAM) 50 mg tablet       TAKE 1  TABLET BY MOUTH EVERY 6 HOURS AS NEEDED    TAKE 1 TABLET BY MOUTH EVERY 6 HOURS AS NEEDED   Discontinued Medications    LEVALBUTEROL (XOPENEX) 1.25 MG/3 ML NEBULIZER SOLUTION    SMARTSI Vial(s) Via Nebulizer 4 Times Daily PRN    PROMETHAZINE-DEXTROMETHORPHAN (PROMETHAZINE-DM) 6.25-15 MG/5 ML SYRP    TAKE 5 ML BY MOUTH EVERY 6 HOURS AS NEEDED FOR COUGH

## 2023-07-21 DIAGNOSIS — K21.9 GASTROESOPHAGEAL REFLUX DISEASE: ICD-10-CM

## 2023-07-21 RX ORDER — OMEPRAZOLE 40 MG/1
CAPSULE, DELAYED RELEASE ORAL
Qty: 90 CAPSULE | Refills: 3 | Status: SHIPPED | OUTPATIENT
Start: 2023-07-21

## 2023-07-21 NOTE — TELEPHONE ENCOUNTER
Refill Decision Note   Bay Yadav  is requesting a refill authorization.  Brief Assessment and Rationale for Refill:  Approve     Medication Therapy Plan:       Medication Reconciliation Completed: No   Comments:     No Care Gaps recommended.     Note composed:9:24 AM 07/21/2023

## 2023-07-21 NOTE — TELEPHONE ENCOUNTER
No care due was identified.  Health [facility] Embedded Care Due Messages. Reference number: [id number].   7/21/2023 3:54:03 AM CDT

## 2023-09-18 ENCOUNTER — PATIENT MESSAGE (OUTPATIENT)
Dept: PRIMARY CARE CLINIC | Facility: CLINIC | Age: 73
End: 2023-09-18
Payer: MEDICARE

## 2023-10-18 ENCOUNTER — PATIENT MESSAGE (OUTPATIENT)
Dept: CARDIOLOGY | Facility: CLINIC | Age: 73
End: 2023-10-18
Payer: MEDICARE

## 2023-10-19 DIAGNOSIS — G44.219 EPISODIC TENSION-TYPE HEADACHE, NOT INTRACTABLE: ICD-10-CM

## 2023-10-19 RX ORDER — BUTALBITAL, ACETAMINOPHEN AND CAFFEINE 50; 325; 40 MG/1; MG/1; MG/1
TABLET ORAL
Qty: 30 TABLET | Refills: 5 | Status: SHIPPED | OUTPATIENT
Start: 2023-10-19 | End: 2024-02-22

## 2023-10-19 NOTE — TELEPHONE ENCOUNTER
No care due was identified.  Health [facility] Embedded Care Due Messages. Reference number: [id number].   10/19/2023 12:40:44 PM CDT

## 2023-10-30 ENCOUNTER — TELEPHONE (OUTPATIENT)
Dept: PRIMARY CARE CLINIC | Facility: CLINIC | Age: 73
End: 2023-10-30
Payer: MEDICARE

## 2023-10-30 DIAGNOSIS — R30.0 DYSURIA: Primary | ICD-10-CM

## 2023-10-30 NOTE — TELEPHONE ENCOUNTER
----- Message from James Avalos sent at 10/30/2023  2:03 PM CDT -----  Contact: [phone]  Patient is returning a phone call.  Who left a message for the patient: Irene  Does patient know what this is regarding:  meds  Would you like a call back, or a response through your [facility] portal?:   call back   Comments:      Pt missed a call and would like a call back

## 2023-10-30 NOTE — TELEPHONE ENCOUNTER
Urinalysis with reflex to urine culture ordered.  He should go to the outpatient lab at the hospital to provide a specimen

## 2023-10-30 NOTE — TELEPHONE ENCOUNTER
----- Message from Chitra Roberts sent at 10/30/2023 12:06 PM CDT -----  Contact: [phone]  Patient is calling for Medical Advice regarding: Patient has passed a kidney stone 3 days ago and patient feel like he is getting an infection and would like an antibiotic ordered for him please.     How long has patient had these symptoms:    Pharmacy name and phone#:  [id number] - [place] - [address] AT [facility] [address]   Phone:  [phone]  Fax:  [phone]      Comments: Please call to confirm.

## 2023-10-30 NOTE — TELEPHONE ENCOUNTER
Called pt regarding message. Pt stated he passed a kidney stone on 10/27.Pt is having pain. Pt also stated he is having burning when urinating. Pt stated symptoms started 1 month ago. Pt is requesting antibiotics.

## 2023-10-31 ENCOUNTER — TELEPHONE (OUTPATIENT)
Dept: PRIMARY CARE CLINIC | Facility: CLINIC | Age: 73
End: 2023-10-31
Payer: MEDICARE

## 2023-10-31 NOTE — TELEPHONE ENCOUNTER
----- Message from Marcelina Nicolas sent at 10/31/2023  4:36 PM CDT -----  Contact: [phone]  Pt is calling he states he had a call from the office he did have labs done today please give return call

## 2023-11-12 DIAGNOSIS — M51.36 DDD (DEGENERATIVE DISC DISEASE), LUMBAR: ICD-10-CM

## 2023-11-12 NOTE — TELEPHONE ENCOUNTER
No care due was identified.  [facility] Embedded Care Due Messages. Reference number: [id number].   11/12/2023 11:39:56 AM CST

## 2023-11-13 RX ORDER — TRAMADOL HYDROCHLORIDE 50 MG/1
TABLET ORAL
Qty: 120 TABLET | Refills: 0 | Status: SHIPPED | OUTPATIENT
Start: 2023-11-13 | End: 2023-11-20 | Stop reason: SDUPTHER

## 2023-11-20 ENCOUNTER — OFFICE VISIT (OUTPATIENT)
Dept: PRIMARY CARE CLINIC | Facility: CLINIC | Age: 73
End: 2023-11-20
Payer: MEDICARE

## 2023-11-20 VITALS
BODY MASS INDEX: 27.16 KG/M2 | HEIGHT: 67 IN | OXYGEN SATURATION: 96 % | HEART RATE: 83 BPM | WEIGHT: 173.06 LBS | TEMPERATURE: 98 F | SYSTOLIC BLOOD PRESSURE: 132 MMHG | DIASTOLIC BLOOD PRESSURE: 68 MMHG | RESPIRATION RATE: 18 BRPM

## 2023-11-20 DIAGNOSIS — N18.31 STAGE 3A CHRONIC KIDNEY DISEASE: Primary | ICD-10-CM

## 2023-11-20 DIAGNOSIS — I10 ESSENTIAL HYPERTENSION, BENIGN: ICD-10-CM

## 2023-11-20 DIAGNOSIS — E78.2 MIXED HYPERLIPIDEMIA: ICD-10-CM

## 2023-11-20 DIAGNOSIS — Z85.46 HISTORY OF PROSTATE CANCER: ICD-10-CM

## 2023-11-20 DIAGNOSIS — M51.36 DDD (DEGENERATIVE DISC DISEASE), LUMBAR: ICD-10-CM

## 2023-11-20 DIAGNOSIS — R73.03 BORDERLINE DIABETES: ICD-10-CM

## 2023-11-20 PROCEDURE — 99214 PR OFFICE/OUTPT VISIT, EST, LEVL IV, 30-39 MIN: ICD-10-PCS | Mod: S$GLB,,, | Performed by: FAMILY MEDICINE

## 2023-11-20 PROCEDURE — 1125F PR PAIN SEVERITY QUANTIFIED, PAIN PRESENT: ICD-10-PCS | Mod: CPTII,S$GLB,, | Performed by: FAMILY MEDICINE

## 2023-11-20 PROCEDURE — 99999 PR PBB SHADOW E&M-EST. PATIENT-LVL IV: CPT | Mod: PBBFAC,,, | Performed by: FAMILY MEDICINE

## 2023-11-20 PROCEDURE — 3008F BODY MASS INDEX DOCD: CPT | Mod: CPTII,S$GLB,, | Performed by: FAMILY MEDICINE

## 2023-11-20 PROCEDURE — 1101F PT FALLS ASSESS-DOCD LE1/YR: CPT | Mod: CPTII,S$GLB,, | Performed by: FAMILY MEDICINE

## 2023-11-20 PROCEDURE — 4010F ACE/ARB THERAPY RXD/TAKEN: CPT | Mod: CPTII,S$GLB,, | Performed by: FAMILY MEDICINE

## 2023-11-20 PROCEDURE — 3075F SYST BP GE 130 - 139MM HG: CPT | Mod: CPTII,S$GLB,, | Performed by: FAMILY MEDICINE

## 2023-11-20 PROCEDURE — 1159F MED LIST DOCD IN RCRD: CPT | Mod: CPTII,S$GLB,, | Performed by: FAMILY MEDICINE

## 2023-11-20 PROCEDURE — 3044F PR MOST RECENT HEMOGLOBIN A1C LEVEL <7.0%: ICD-10-PCS | Mod: CPTII,S$GLB,, | Performed by: FAMILY MEDICINE

## 2023-11-20 PROCEDURE — 1160F PR REVIEW ALL MEDS BY PRESCRIBER/CLIN PHARMACIST DOCUMENTED: ICD-10-PCS | Mod: CPTII,S$GLB,, | Performed by: FAMILY MEDICINE

## 2023-11-20 PROCEDURE — 1160F RVW MEDS BY RX/DR IN RCRD: CPT | Mod: CPTII,S$GLB,, | Performed by: FAMILY MEDICINE

## 2023-11-20 PROCEDURE — 3078F PR MOST RECENT DIASTOLIC BLOOD PRESSURE < 80 MM HG: ICD-10-PCS | Mod: CPTII,S$GLB,, | Performed by: FAMILY MEDICINE

## 2023-11-20 PROCEDURE — 1159F PR MEDICATION LIST DOCUMENTED IN MEDICAL RECORD: ICD-10-PCS | Mod: CPTII,S$GLB,, | Performed by: FAMILY MEDICINE

## 2023-11-20 PROCEDURE — 1101F PR PT FALLS ASSESS DOC 0-1 FALLS W/OUT INJ PAST YR: ICD-10-PCS | Mod: CPTII,S$GLB,, | Performed by: FAMILY MEDICINE

## 2023-11-20 PROCEDURE — 99214 OFFICE O/P EST MOD 30 MIN: CPT | Mod: S$GLB,,, | Performed by: FAMILY MEDICINE

## 2023-11-20 PROCEDURE — 3288F FALL RISK ASSESSMENT DOCD: CPT | Mod: CPTII,S$GLB,, | Performed by: FAMILY MEDICINE

## 2023-11-20 PROCEDURE — 3075F PR MOST RECENT SYSTOLIC BLOOD PRESS GE 130-139MM HG: ICD-10-PCS | Mod: CPTII,S$GLB,, | Performed by: FAMILY MEDICINE

## 2023-11-20 PROCEDURE — 4010F PR ACE/ARB THEARPY RXD/TAKEN: ICD-10-PCS | Mod: CPTII,S$GLB,, | Performed by: FAMILY MEDICINE

## 2023-11-20 PROCEDURE — 99999 PR PBB SHADOW E&M-EST. PATIENT-LVL IV: ICD-10-PCS | Mod: PBBFAC,,, | Performed by: FAMILY MEDICINE

## 2023-11-20 PROCEDURE — 3044F HG A1C LEVEL LT 7.0%: CPT | Mod: CPTII,S$GLB,, | Performed by: FAMILY MEDICINE

## 2023-11-20 PROCEDURE — 3008F PR BODY MASS INDEX (BMI) DOCUMENTED: ICD-10-PCS | Mod: CPTII,S$GLB,, | Performed by: FAMILY MEDICINE

## 2023-11-20 PROCEDURE — 1125F AMNT PAIN NOTED PAIN PRSNT: CPT | Mod: CPTII,S$GLB,, | Performed by: FAMILY MEDICINE

## 2023-11-20 PROCEDURE — 3078F DIAST BP <80 MM HG: CPT | Mod: CPTII,S$GLB,, | Performed by: FAMILY MEDICINE

## 2023-11-20 PROCEDURE — 3288F PR FALLS RISK ASSESSMENT DOCUMENTED: ICD-10-PCS | Mod: CPTII,S$GLB,, | Performed by: FAMILY MEDICINE

## 2023-11-20 RX ORDER — TRAMADOL HYDROCHLORIDE 50 MG/1
50 TABLET ORAL EVERY 6 HOURS PRN
Qty: 120 TABLET | Refills: 5 | Status: SHIPPED | OUTPATIENT
Start: 2023-12-13 | End: 2024-02-02

## 2023-11-20 NOTE — PROGRESS NOTES
"Subjective:       Patient ID: Bay Yadav is a 72 y.o. male.    Chief Complaint: Follow-up (6 month follow up/reg check up)    Bay Yadav is a 72 y.o. male seen today for a routine checkup. The patient has no new complaints or concerns at this time.  No recent illness or injury.  Compliant with all prescribed medications without adverse side effects.       Review of Systems   Constitutional:  Negative for chills and fever.   Respiratory:  Negative for shortness of breath.    Cardiovascular:  Negative for chest pain.   Genitourinary:  Positive for penile pain.   Musculoskeletal:  Positive for arthralgias, back pain and neck pain.       Objective:      Vitals:    11/20/23 0916   BP: 132/68   BP Location: Right arm   Patient Position: Sitting   BP Method: Medium (Manual)   Pulse: 83   Resp: 18   Temp: 97.8 °F (36.6 °C)   TempSrc: Temporal   SpO2: 96%   Weight: 78.5 kg (173 lb 1 oz)   Height: 5' 7" (1.702 m)     BP Readings from Last 5 Encounters:   11/20/23 132/68   05/19/23 132/72   11/18/22 118/64   05/18/22 112/62   04/22/22 136/77     Wt Readings from Last 5 Encounters:   11/20/23 78.5 kg (173 lb 1 oz)   05/19/23 83.4 kg (183 lb 15.6 oz)   11/18/22 84.8 kg (186 lb 15.2 oz)   05/18/22 80.6 kg (177 lb 9.3 oz)   04/22/22 82.1 kg (181 lb)     Physical Exam  Vitals and nursing note reviewed.   Constitutional:       General: He is not in acute distress.     Appearance: Normal appearance. He is well-developed.   HENT:      Head: Normocephalic and atraumatic.   Cardiovascular:      Rate and Rhythm: Normal rate and regular rhythm.      Heart sounds: Normal heart sounds.   Pulmonary:      Effort: Pulmonary effort is normal.      Breath sounds: Normal breath sounds.   Abdominal:      General: There is no distension.      Tenderness: There is no abdominal tenderness.   Musculoskeletal:      Right lower leg: No edema.      Left lower leg: No edema.   Skin:     General: Skin is warm and dry.   Neurological:      " Mental Status: He is alert and oriented to person, place, and time.   Psychiatric:         Mood and Affect: Mood normal.         Behavior: Behavior normal.         Lab Results   Component Value Date    WBC 6.42 05/12/2023    HGB 13.7 (L) 05/12/2023    HCT 41.7 05/12/2023     05/12/2023    CHOL 169 05/12/2023    TRIG 156 (H) 05/12/2023    HDL 59 05/12/2023    ALT 30 05/12/2023    AST 19 05/12/2023     11/13/2023    K 4.5 11/13/2023     11/13/2023    CREATININE 1.4 11/13/2023    BUN 15 11/13/2023    CO2 26 11/13/2023    INR 1.0 07/21/2006    HGBA1C 5.6 05/12/2023      Assessment:       1. Stage 3a chronic kidney disease    2. Essential hypertension, benign    3. Mixed hyperlipidemia    4. History of prostate cancer    5. Borderline diabetes    6. DDD (degenerative disc disease), lumbar        Plan:       Stage 3a chronic kidney disease  -     CBC Auto Differential; Future; Expected date: 05/20/2024  -     Comprehensive Metabolic Panel; Future; Expected date: 05/20/2024  Stable, avoid nephrotoxins  Essential hypertension, benign  Well controlled  Mixed hyperlipidemia  -     Comprehensive Metabolic Panel; Future; Expected date: 05/20/2024  -     Lipid Panel; Future; Expected date: 05/20/2024    History of prostate cancer  -     PROSTATE SPECIFIC ANTIGEN, DIAGNOSTIC; Future; Expected date: 05/20/2024    Borderline diabetes  -     Hemoglobin A1C; Future; Expected date: 05/20/2024  Limit carbohydrate intake  DDD (degenerative disc disease), lumbar  -     traMADoL (ULTRAM) 50 mg tablet; Take 1 tablet (50 mg total) by mouth every 6 (six) hours as needed for Pain.  Dispense: 120 tablet; Refill: 5  Stable,  reviewed    Medication List with Changes/Refills   Current Medications    BACLOFEN (LIORESAL) 10 MG TABLET    Take 1 tablet (10 mg total) by mouth 2 (two) times daily.    BUTALBITAL-ACETAMINOPHEN-CAFFEINE -40 MG (FIORICET, ESGIC) -40 MG PER TABLET    TAKE 1 TABLET BY MOUTH EVERY 6 HOURS AS  NEEDED FOR HEADACHE    ESCITALOPRAM OXALATE (LEXAPRO) 10 MG TABLET    Take 1 tablet (10 mg total) by mouth once daily.    LISINOPRIL 10 MG TABLET    Take 1 tablet (10 mg total) by mouth once daily.    MECLIZINE (ANTIVERT) 25 MG TABLET    TAKE 1 TABLET(25 MG) BY MOUTH THREE TIMES DAILY AS NEEDED    MULTIVITAMIN WITH MINERALS TABLET    Take 1 tablet by mouth once daily.    OMEPRAZOLE (PRILOSEC) 40 MG CAPSULE    TAKE 1 CAPSULE(40 MG) BY MOUTH EVERY DAY    PROMETHAZINE (PHENERGAN) 25 MG TABLET    TAKE 1 TABLET(25 MG) BY MOUTH EVERY 6 HOURS AS NEEDED FOR NAUSEA    ROSUVASTATIN (CRESTOR) 20 MG TABLET    Take 1 tablet (20 mg total) by mouth once daily.    TRAZODONE (DESYREL) 50 MG TABLET    Take 1-2 tablets ( mg total) by mouth nightly as needed for Insomnia.   Changed and/or Refilled Medications    Modified Medication Previous Medication    TRAMADOL (ULTRAM) 50 MG TABLET traMADoL (ULTRAM) 50 mg tablet       Take 1 tablet (50 mg total) by mouth every 6 (six) hours as needed for Pain.    TAKE 1 TABLET BY MOUTH EVERY 6 HOURS AS NEEDED

## 2023-12-07 DIAGNOSIS — G47.00 INSOMNIA, UNSPECIFIED TYPE: ICD-10-CM

## 2023-12-07 NOTE — TELEPHONE ENCOUNTER
No care due was identified.  Health [facility] Embedded Care Due Messages. Reference number: [id number].   12/07/2023 10:45:44 AM CST

## 2023-12-08 RX ORDER — TRAZODONE HYDROCHLORIDE 50 MG/1
TABLET ORAL
Qty: 180 TABLET | Refills: 3 | Status: SHIPPED | OUTPATIENT
Start: 2023-12-08

## 2023-12-08 NOTE — TELEPHONE ENCOUNTER
Refill Decision Note   Bay Yadav  is requesting a refill authorization.    Brief Assessment and Rationale for Refill:   Approve       Medication Therapy Plan:         Comments:     Note composed:12:26 PM 12/08/2023

## 2024-02-02 DIAGNOSIS — M51.36 DDD (DEGENERATIVE DISC DISEASE), LUMBAR: ICD-10-CM

## 2024-02-02 RX ORDER — TRAMADOL HYDROCHLORIDE 50 MG/1
50 TABLET ORAL EVERY 6 HOURS PRN
Qty: 120 TABLET | Refills: 3 | Status: SHIPPED | OUTPATIENT
Start: 2024-02-02 | End: 2024-06-04

## 2024-02-02 NOTE — TELEPHONE ENCOUNTER
No care due was identified.  Health [facility] Embedded Care Due Messages. Reference number: [id number].   2/02/2024 4:55:29 PM CST

## 2024-02-22 DIAGNOSIS — G44.219 EPISODIC TENSION-TYPE HEADACHE, NOT INTRACTABLE: ICD-10-CM

## 2024-02-22 RX ORDER — BUTALBITAL, ACETAMINOPHEN AND CAFFEINE 50; 325; 40 MG/1; MG/1; MG/1
TABLET ORAL
Qty: 30 TABLET | Refills: 5 | Status: SHIPPED | OUTPATIENT
Start: 2024-02-22

## 2024-02-22 NOTE — TELEPHONE ENCOUNTER
Care Due:                  Date            Visit Type   Department     Provider  --------------------------------------------------------------------------------                                EP -                              PRIMARY [facility]  Last Visit: 11-      CARE ([facility])   PRIMARY CARE   Wayne Brennan                              [facility]                              [place]      [facility] [place]  Next Visit: 05-      CARE ([facility])   PRIMARY CARE   Wayne Brennan                                                            Last  Test          Frequency    Reason                     Performed    Due Date  --------------------------------------------------------------------------------    CMP.........  12 months..  rosuvastatin.............  05- 05-    Lipid Panel.  12 months..  rosuvastatin.............  05- 05-    [facility] [employer] Embedded Care Due Messages. Reference number: [id number].   2/22/2024 11:35:54 AM CST

## 2024-03-06 ENCOUNTER — TELEPHONE (OUTPATIENT)
Dept: PRIMARY CARE CLINIC | Facility: CLINIC | Age: 74
End: 2024-03-06
Payer: MEDICARE

## 2024-03-06 ENCOUNTER — PATIENT MESSAGE (OUTPATIENT)
Dept: PRIMARY CARE CLINIC | Facility: CLINIC | Age: 74
End: 2024-03-06
Payer: MEDICARE

## 2024-03-06 NOTE — TELEPHONE ENCOUNTER
Called pt's wife regarding message. Pt's wife stated she thinks pt has UTI. Pt has polyuria and dysuria. Symptoms started 3 week ago. Offered E-visit. Pt's wife declined. Pt's wife is requesting  UA order.

## 2024-03-06 NOTE — TELEPHONE ENCOUNTER
----- Message from Bria Nash sent at 3/6/2024  3:26 PM CST -----  Contact: [phone] Pts spouse  [email or web address]     Patient is calling for Medical Advice regarding:UTI, burning and pressure when urinating, getting worse    How long has patient had these symptoms: 2 weeks    Pharmacy name and phone#:   [id number] - [place] - [address] AT [facility] OF [address]  [address] [address]  Phone: [phone] Fax: [phone]        Would like response via [employer]:  Call Back please. Pt is asking if the doctor can put orders in for a urinalysis please or if the doctor just wants to call something in that would be fine. Thank you    Comments: Please call spouse pt is sleeping not feeling well

## 2024-03-06 NOTE — TELEPHONE ENCOUNTER
E-visit information sent to the patient through the portal.  He can also opt to go to urgent care.

## 2024-03-07 NOTE — TELEPHONE ENCOUNTER
Informed pt E-visit information sent to the patient through the portal.  He can also opt to go to urgent care.

## 2024-03-13 ENCOUNTER — TELEPHONE (OUTPATIENT)
Dept: PRIMARY CARE CLINIC | Facility: CLINIC | Age: 74
End: 2024-03-13
Payer: MEDICARE

## 2024-03-13 NOTE — TELEPHONE ENCOUNTER
Patient reports he completed an e-visit but did not get a response.  Will follow up with provider and call the patient back

## 2024-03-13 NOTE — TELEPHONE ENCOUNTER
----- Message from Carol Mclaughlin sent at 3/12/2024  3:01 PM CDT -----  Who Called: Patient    What is the request in detail: Requesting call back to discuss scheduling NP appointment from attached referral to be seen for the following:      Can the clinic reply by [facility]? NO    Best Call Back Number: [phone]    Additional Information:  Pt wife called stating she called last Friday regarding Pt, burning while urinating, in pain.  Pt completed for E-Visit, and has not heard from clinical staff  Please call to discuss.

## 2024-03-28 ENCOUNTER — TELEPHONE (OUTPATIENT)
Dept: PRIMARY CARE CLINIC | Facility: CLINIC | Age: 74
End: 2024-03-28
Payer: MEDICARE

## 2024-03-28 NOTE — TELEPHONE ENCOUNTER
----- Message from Carol Mclaughlin sent at 3/12/2024  3:01 PM CDT -----  Who Called: Patient    What is the request in detail: Requesting call back to discuss scheduling NP appointment from attached referral to be seen for the following:      Can the clinic reply by [facility]? NO    Best Call Back Number: [phone]    Additional Information:  Pt wife called stating she called last Friday regarding Pt, burning while urinating, in pain.  Pt completed for E-Visit, and has not heard from clinical staff  Please call to discuss.

## 2024-04-30 DIAGNOSIS — E78.2 MIXED HYPERLIPIDEMIA: ICD-10-CM

## 2024-05-01 RX ORDER — ROSUVASTATIN CALCIUM 20 MG/1
20 TABLET, COATED ORAL
Qty: 90 TABLET | Refills: 0 | Status: SHIPPED | OUTPATIENT
Start: 2024-05-01

## 2024-05-01 NOTE — TELEPHONE ENCOUNTER
Care Due:                  Date            Visit Type   Department     Provider  --------------------------------------------------------------------------------                                EP -                              PRIMARY [facility]  Last Visit: 11-      CARE ([facility])   PRIMARY CARE   Wayne rBennan                              [facility]                              [place]      [facility] [place] [facility]  Next Visit: 05-      CARE ([facility])   PRIMARY CARE   Wayne Brennan                                                            Last  Test          Frequency    Reason                     Performed    Due Date  --------------------------------------------------------------------------------    CMP.........  12 months..  rosuvastatin.............  05- 05-    Lipid Panel.  12 months..  rosuvastatin.............  05- 05-    Health [facility] Embedded Care Due Messages. Reference number: [id number].   4/30/2024 11:27:58 PM CDT

## 2024-05-01 NOTE — TELEPHONE ENCOUNTER
Refill Decision Note   Bay Yadav  is requesting a refill authorization.  Brief Assessment and Rationale for Refill:  Approve     Medication Therapy Plan:  FLOS      Comments:     Note composed:4:18 PM 05/01/2024

## 2024-05-04 DIAGNOSIS — I10 ESSENTIAL HYPERTENSION, BENIGN: ICD-10-CM

## 2024-05-04 DIAGNOSIS — E78.2 MIXED HYPERLIPIDEMIA: ICD-10-CM

## 2024-05-04 DIAGNOSIS — F32.4 MAJOR DEPRESSIVE DISORDER WITH SINGLE EPISODE, IN PARTIAL REMISSION: ICD-10-CM

## 2024-05-04 NOTE — TELEPHONE ENCOUNTER
No care due was identified.  [facility] Embedded Care Due Messages. Reference number: [id number].   5/04/2024 5:46:58 PM CDT

## 2024-05-05 RX ORDER — LISINOPRIL 10 MG/1
10 TABLET ORAL
Qty: 90 TABLET | Refills: 1 | Status: SHIPPED | OUTPATIENT
Start: 2024-05-05

## 2024-05-05 RX ORDER — ESCITALOPRAM OXALATE 10 MG/1
10 TABLET ORAL
Qty: 90 TABLET | Refills: 1 | Status: SHIPPED | OUTPATIENT
Start: 2024-05-05

## 2024-05-05 RX ORDER — ROSUVASTATIN CALCIUM 20 MG/1
20 TABLET, COATED ORAL
Qty: 90 TABLET | Refills: 0 | OUTPATIENT
Start: 2024-05-05

## 2024-05-05 NOTE — TELEPHONE ENCOUNTER
Refill Decision Note   Bay Yadav  is requesting a refill authorization.  Brief Assessment and Rationale for Refill:  Quick Discontinue  Approve     Medication Therapy Plan: Rosuvastatin: Receipt confirmed by pharmacy (5/1/2024  4:19 PM CDT)      Comments:     Note composed:2:21 AM 05/05/2024

## 2024-05-13 ENCOUNTER — PATIENT MESSAGE (OUTPATIENT)
Dept: UROLOGY | Facility: CLINIC | Age: 74
End: 2024-05-13
Payer: MEDICARE

## 2024-05-13 DIAGNOSIS — C61 PROSTATE CANCER: Primary | ICD-10-CM

## 2024-05-13 DIAGNOSIS — Z90.79 HISTORY OF ROBOT-ASSISTED LAPAROSCOPIC RADICAL PROSTATECTOMY: ICD-10-CM

## 2024-05-20 ENCOUNTER — OFFICE VISIT (OUTPATIENT)
Dept: PRIMARY CARE CLINIC | Facility: CLINIC | Age: 74
End: 2024-05-20
Payer: MEDICARE

## 2024-05-20 VITALS
DIASTOLIC BLOOD PRESSURE: 68 MMHG | HEART RATE: 72 BPM | SYSTOLIC BLOOD PRESSURE: 124 MMHG | HEIGHT: 67 IN | RESPIRATION RATE: 18 BRPM | WEIGHT: 175.81 LBS | BODY MASS INDEX: 27.6 KG/M2 | OXYGEN SATURATION: 95 % | TEMPERATURE: 98 F

## 2024-05-20 DIAGNOSIS — Z85.46 HISTORY OF PROSTATE CANCER: ICD-10-CM

## 2024-05-20 DIAGNOSIS — F32.4 MAJOR DEPRESSIVE DISORDER WITH SINGLE EPISODE, IN PARTIAL REMISSION: ICD-10-CM

## 2024-05-20 DIAGNOSIS — E78.2 MIXED HYPERLIPIDEMIA: ICD-10-CM

## 2024-05-20 DIAGNOSIS — R73.03 BORDERLINE DIABETES: ICD-10-CM

## 2024-05-20 DIAGNOSIS — I70.0 ATHEROSCLEROSIS OF ABDOMINAL AORTA: ICD-10-CM

## 2024-05-20 DIAGNOSIS — I10 ESSENTIAL HYPERTENSION, BENIGN: Primary | ICD-10-CM

## 2024-05-20 PROBLEM — N18.31 STAGE 3A CHRONIC KIDNEY DISEASE: Status: RESOLVED | Noted: 2021-05-19 | Resolved: 2024-05-20

## 2024-05-20 PROCEDURE — 99999 PR PBB SHADOW E&M-EST. PATIENT-LVL IV: CPT | Mod: PBBFAC,,, | Performed by: FAMILY MEDICINE

## 2024-05-20 PROCEDURE — 4010F ACE/ARB THERAPY RXD/TAKEN: CPT | Mod: CPTII,S$GLB,, | Performed by: FAMILY MEDICINE

## 2024-05-20 PROCEDURE — 1101F PT FALLS ASSESS-DOCD LE1/YR: CPT | Mod: CPTII,S$GLB,, | Performed by: FAMILY MEDICINE

## 2024-05-20 PROCEDURE — 3078F DIAST BP <80 MM HG: CPT | Mod: CPTII,S$GLB,, | Performed by: FAMILY MEDICINE

## 2024-05-20 PROCEDURE — 3044F HG A1C LEVEL LT 7.0%: CPT | Mod: CPTII,S$GLB,, | Performed by: FAMILY MEDICINE

## 2024-05-20 PROCEDURE — 99214 OFFICE O/P EST MOD 30 MIN: CPT | Mod: S$GLB,,, | Performed by: FAMILY MEDICINE

## 2024-05-20 PROCEDURE — 1125F AMNT PAIN NOTED PAIN PRSNT: CPT | Mod: CPTII,S$GLB,, | Performed by: FAMILY MEDICINE

## 2024-05-20 PROCEDURE — 3074F SYST BP LT 130 MM HG: CPT | Mod: CPTII,S$GLB,, | Performed by: FAMILY MEDICINE

## 2024-05-20 PROCEDURE — 3288F FALL RISK ASSESSMENT DOCD: CPT | Mod: CPTII,S$GLB,, | Performed by: FAMILY MEDICINE

## 2024-05-20 PROCEDURE — 1159F MED LIST DOCD IN RCRD: CPT | Mod: CPTII,S$GLB,, | Performed by: FAMILY MEDICINE

## 2024-05-20 PROCEDURE — 1160F RVW MEDS BY RX/DR IN RCRD: CPT | Mod: CPTII,S$GLB,, | Performed by: FAMILY MEDICINE

## 2024-05-20 PROCEDURE — 3008F BODY MASS INDEX DOCD: CPT | Mod: CPTII,S$GLB,, | Performed by: FAMILY MEDICINE

## 2024-05-20 NOTE — PROGRESS NOTES
"Subjective:       Patient ID: Bay Yadav is a 73 y.o. male.    Chief Complaint: Follow-up (6 month/reg check up)    Bay Yadav is a 73 y.o. male seen today for a routine checkup. The patient has no specific complaints or concerns at this time.  No recent illness or injury.  Compliant with all prescribed medications without adverse side effects.     Follow-up  Associated symptoms include arthralgias. Pertinent negatives include no chest pain or fever.     Review of Systems   Constitutional:  Negative for fever and unexpected weight change.   Respiratory:  Negative for shortness of breath.    Cardiovascular:  Negative for chest pain.   Musculoskeletal:  Positive for arthralgias and back pain.   Neurological:  Negative for dizziness.       Objective:      Vitals:    05/20/24 0905   BP: 124/68   BP Location: Right arm   Patient Position: Sitting   BP Method: Medium (Manual)   Pulse: 72   Resp: 18   Temp: 98 °F (36.7 °C)   TempSrc: Temporal   SpO2: 95%   Weight: 79.8 kg (175 lb 13.1 oz)   Height: 5' 7" (1.702 m)     BP Readings from Last 5 Encounters:   05/20/24 124/68   11/20/23 132/68   05/19/23 132/72   11/18/22 118/64   05/18/22 112/62     Wt Readings from Last 5 Encounters:   05/20/24 79.8 kg (175 lb 13.1 oz)   11/20/23 78.5 kg (173 lb 1 oz)   05/19/23 83.4 kg (183 lb 15.6 oz)   11/18/22 84.8 kg (186 lb 15.2 oz)   05/18/22 80.6 kg (177 lb 9.3 oz)     Physical Exam  Vitals and nursing note reviewed.   Constitutional:       General: He is not in acute distress.     Appearance: Normal appearance. He is well-developed.   HENT:      Head: Normocephalic and atraumatic.   Cardiovascular:      Rate and Rhythm: Normal rate and regular rhythm.      Heart sounds: Normal heart sounds.   Pulmonary:      Effort: Pulmonary effort is normal.      Breath sounds: Normal breath sounds.   Musculoskeletal:      Right lower leg: No edema.      Left lower leg: No edema.   Skin:     General: Skin is warm and dry. "   Neurological:      Mental Status: He is alert and oriented to person, place, and time.   Psychiatric:         Mood and Affect: Mood normal.         Behavior: Behavior normal.         Lab Results   Component Value Date    WBC 5.81 05/13/2024    HGB 13.3 (L) 05/13/2024    HCT 41.5 05/13/2024     05/13/2024    CHOL 158 05/13/2024    TRIG 150 05/13/2024    HDL 56 05/13/2024    ALT 17 05/13/2024    AST 16 05/13/2024     05/13/2024    K 4.8 05/13/2024     05/13/2024    CREATININE 1.2 05/13/2024    BUN 14 05/13/2024    CO2 25 05/13/2024    INR 1.0 07/21/2006    HGBA1C 5.6 05/13/2024      Assessment:       1. Essential hypertension, benign    2. Major depressive disorder with single episode, in partial remission    3. Atherosclerosis of abdominal aorta    4. History of prostate cancer    5. Borderline diabetes    6. Mixed hyperlipidemia        Plan:       Essential hypertension, benign  Stable, continue current meds  Major depressive disorder with single episode, in partial remission  Stable on current regimen  Atherosclerosis of abdominal aorta  Medical management  History of prostate cancer  PSA remains undetectable  Borderline diabetes  -     Hemoglobin A1C; Future; Expected date: 11/20/2024  Limit carbohydrate intake  Mixed hyperlipidemia  Continue statin    Medication List with Changes/Refills   Current Medications    BACLOFEN (LIORESAL) 10 MG TABLET    Take 1 tablet (10 mg total) by mouth 2 (two) times daily.    BUTALBITAL-ACETAMINOPHEN-CAFFEINE -40 MG (FIORICET, ESGIC) -40 MG PER TABLET    TAKE 1 TABLET BY MOUTH EVERY 6 HOURS AS NEEDED FOR HEADACHE    ESCITALOPRAM OXALATE (LEXAPRO) 10 MG TABLET    TAKE 1 TABLET(10 MG) BY MOUTH EVERY DAY    LISINOPRIL 10 MG TABLET    TAKE 1 TABLET(10 MG) BY MOUTH EVERY DAY    MECLIZINE (ANTIVERT) 25 MG TABLET    TAKE 1 TABLET(25 MG) BY MOUTH THREE TIMES DAILY AS NEEDED    MULTIVITAMIN WITH MINERALS TABLET    Take 1 tablet by mouth once daily.     OMEPRAZOLE (PRILOSEC) 40 MG CAPSULE    TAKE 1 CAPSULE(40 MG) BY MOUTH EVERY DAY    PROMETHAZINE (PHENERGAN) 25 MG TABLET    TAKE 1 TABLET(25 MG) BY MOUTH EVERY 6 HOURS AS NEEDED FOR NAUSEA    ROSUVASTATIN (CRESTOR) 20 MG TABLET    TAKE 1 TABLET(20 MG) BY MOUTH EVERY DAY    TRAMADOL (ULTRAM) 50 MG TABLET    TAKE 1 TABLET BY MOUTH EVERY 6 HOURS AS NEEDED    TRAZODONE (DESYREL) 50 MG TABLET    TAKE 1 TO 2 TABLETS(50  MG) BY MOUTH EVERY NIGHT AS NEEDED FOR INSOMNIA

## 2024-06-04 DIAGNOSIS — M51.36 DDD (DEGENERATIVE DISC DISEASE), LUMBAR: ICD-10-CM

## 2024-06-04 RX ORDER — TRAMADOL HYDROCHLORIDE 50 MG/1
50 TABLET ORAL EVERY 6 HOURS PRN
Qty: 120 TABLET | Refills: 5 | Status: SHIPPED | OUTPATIENT
Start: 2024-06-04

## 2024-06-04 NOTE — TELEPHONE ENCOUNTER
No care due was identified.  [facility] Embedded Care Due Messages. Reference number: [id number].   6/04/2024 12:04:31 AM CDT

## 2024-06-18 DIAGNOSIS — M51.36 DDD (DEGENERATIVE DISC DISEASE), LUMBAR: ICD-10-CM

## 2024-06-18 RX ORDER — BACLOFEN 10 MG/1
10 TABLET ORAL 2 TIMES DAILY
Qty: 180 TABLET | Refills: 0 | Status: SHIPPED | OUTPATIENT
Start: 2024-06-18

## 2024-06-27 DIAGNOSIS — G44.219 EPISODIC TENSION-TYPE HEADACHE, NOT INTRACTABLE: ICD-10-CM

## 2024-06-27 RX ORDER — BUTALBITAL, ACETAMINOPHEN AND CAFFEINE 50; 325; 40 MG/1; MG/1; MG/1
TABLET ORAL
Qty: 30 TABLET | Refills: 5 | OUTPATIENT
Start: 2024-06-27

## 2024-06-27 RX ORDER — BUTALBITAL, ACETAMINOPHEN AND CAFFEINE 50; 325; 40 MG/1; MG/1; MG/1
1 TABLET ORAL EVERY 6 HOURS PRN
Qty: 30 TABLET | Refills: 5 | Status: SHIPPED | OUTPATIENT
Start: 2024-06-27

## 2024-06-27 NOTE — TELEPHONE ENCOUNTER
No care due was identified.  [facility] Embedded Care Due Messages. Reference number: [id number].   6/27/2024 11:12:59 AM CDT

## 2024-06-27 NOTE — TELEPHONE ENCOUNTER
No care due was identified.  [facility] Embedded Care Due Messages. Reference number: [id number].   6/27/2024 11:14:37 AM CDT

## 2024-07-11 DIAGNOSIS — K21.9 GASTROESOPHAGEAL REFLUX DISEASE: ICD-10-CM

## 2024-07-11 RX ORDER — OMEPRAZOLE 40 MG/1
40 CAPSULE, DELAYED RELEASE ORAL DAILY
Qty: 90 CAPSULE | Refills: 3 | Status: SHIPPED | OUTPATIENT
Start: 2024-07-11

## 2024-07-11 NOTE — TELEPHONE ENCOUNTER
No care due was identified.  [facility] Embedded Care Due Messages. Reference number: [id number].   7/11/2024 9:25:56 AM CDT

## 2024-07-11 NOTE — TELEPHONE ENCOUNTER
Refill Decision Note   Bay Yadav  is requesting a refill authorization.  Brief Assessment and Rationale for Refill:  Approve     Medication Therapy Plan:        Comments:     Note composed:12:15 PM 07/11/2024

## 2024-07-26 DIAGNOSIS — I10 ESSENTIAL HYPERTENSION, BENIGN: ICD-10-CM

## 2024-07-26 NOTE — TELEPHONE ENCOUNTER
No care due was identified.  [facility] Embedded Care Due Messages. Reference number: [id number].   7/26/2024 1:59:49 PM CDT

## 2024-07-28 DIAGNOSIS — E78.2 MIXED HYPERLIPIDEMIA: ICD-10-CM

## 2024-07-28 RX ORDER — LISINOPRIL 10 MG/1
10 TABLET ORAL DAILY
Qty: 90 TABLET | Refills: 3 | Status: SHIPPED | OUTPATIENT
Start: 2024-07-28

## 2024-07-28 NOTE — TELEPHONE ENCOUNTER
No care due was identified.  [facility] Embedded Care Due Messages. Reference number: [id number].   7/28/2024 12:10:38 AM CDT

## 2024-07-28 NOTE — TELEPHONE ENCOUNTER
No care due was identified.  [facility] Embedded Care Due Messages. Reference number: [id number].   7/28/2024 12:09:33 AM CDT

## 2024-07-28 NOTE — TELEPHONE ENCOUNTER
Refill Decision Note   Bay Yadav  is requesting a refill authorization.  Brief Assessment and Rationale for Refill:  Approve     Medication Therapy Plan:        Comments:     Note composed:3:51 PM 07/28/2024

## 2024-07-29 RX ORDER — ROSUVASTATIN CALCIUM 20 MG/1
20 TABLET, COATED ORAL
Qty: 90 TABLET | Refills: 0 | OUTPATIENT
Start: 2024-07-29

## 2024-07-29 RX ORDER — ROSUVASTATIN CALCIUM 20 MG/1
20 TABLET, COATED ORAL DAILY
Qty: 90 TABLET | Refills: 3 | Status: SHIPPED | OUTPATIENT
Start: 2024-07-29

## 2024-07-29 NOTE — TELEPHONE ENCOUNTER
Annabel DC. Request already responded to by other means (e.g. phone or fax)   Refill Authorization Note   Bay Yadav  is requesting a refill authorization.  Brief Assessment and Rationale for Refill:  Quick Discontinue  Medication Therapy Plan:       Medication Reconciliation Completed:  No      Comments:     Note composed:5:25 PM 07/29/2024

## 2024-07-29 NOTE — TELEPHONE ENCOUNTER
Refill Decision Note   Bay Yadav  is requesting a refill authorization.  Brief Assessment and Rationale for Refill:  Approve     Medication Therapy Plan:        Comments:     Note composed:5:24 PM 07/29/2024

## 2024-08-28 RX ORDER — PROMETHAZINE HYDROCHLORIDE 25 MG/1
25 TABLET ORAL EVERY 6 HOURS PRN
Qty: 30 TABLET | Refills: 2 | Status: SHIPPED | OUTPATIENT
Start: 2024-08-28

## 2024-08-28 NOTE — TELEPHONE ENCOUNTER
No care due was identified.  Health [facility] Embedded Care Due Messages. Reference number: [id number].   8/28/2024 4:29:53 AM CDT

## 2024-09-13 DIAGNOSIS — M51.36 DDD (DEGENERATIVE DISC DISEASE), LUMBAR: ICD-10-CM

## 2024-09-13 RX ORDER — BACLOFEN 10 MG/1
10 TABLET ORAL 2 TIMES DAILY
Qty: 180 TABLET | Refills: 0 | OUTPATIENT
Start: 2024-09-13

## 2024-09-13 RX ORDER — BACLOFEN 10 MG/1
10 TABLET ORAL 2 TIMES DAILY
Qty: 180 TABLET | Refills: 3 | Status: SHIPPED | OUTPATIENT
Start: 2024-09-13

## 2024-09-13 NOTE — TELEPHONE ENCOUNTER
Refill Routing Note   Medication(s) are not appropriate for processing by [facility] for the following reason(s):        Outside of protocol: non-delegated  2 requests received    [facility] action(s):  Route      Medication Therapy Plan:         Appointments  past 12m or future 3m with PCP    Date Provider   Last Visit   5/20/2024 Wayne Brennan MD   Next Visit   11/25/2024 Wayne Brennan MD   ED visits in past 90 days: 0        Note composed:2:22 PM 09/13/2024

## 2024-09-13 NOTE — TELEPHONE ENCOUNTER
Refill Routing Note   Medication(s) are not appropriate for processing by [facility] for the following reason(s):        Outside of protocol: non-delegated duplicate    [facility] action(s):  Route      Medication Therapy Plan:       Pharmacist review requested: Yes     Appointments  past 12m or future 3m with PCP    Date Provider   Last Visit   5/20/2024 Wayne Brennan MD   Next Visit   11/25/2024 Wayne Brennan MD   ED visits in past 90 days: 0        Note composed:2:22 PM 09/13/2024

## 2024-09-13 NOTE — TELEPHONE ENCOUNTER
Refill Decision Note   Bay Yadav  is requesting a refill authorization.  Brief Assessment and Rationale for Refill:  Quick Discontinue     Medication Therapy Plan:         Pharmacist review requested: Yes   Comments:     Note composed:3:29 PM 09/13/2024

## 2024-09-19 ENCOUNTER — PATIENT MESSAGE (OUTPATIENT)
Dept: PRIMARY CARE CLINIC | Facility: CLINIC | Age: 74
End: 2024-09-19
Payer: MEDICARE

## 2024-10-30 DIAGNOSIS — G44.219 EPISODIC TENSION-TYPE HEADACHE, NOT INTRACTABLE: ICD-10-CM

## 2024-10-31 RX ORDER — BUTALBITAL, ACETAMINOPHEN AND CAFFEINE 50; 325; 40 MG/1; MG/1; MG/1
1 TABLET ORAL EVERY 6 HOURS PRN
Qty: 30 TABLET | Refills: 5 | Status: SHIPPED | OUTPATIENT
Start: 2024-10-31

## 2024-11-06 DIAGNOSIS — F32.4 MAJOR DEPRESSIVE DISORDER WITH SINGLE EPISODE, IN PARTIAL REMISSION: ICD-10-CM

## 2024-11-06 RX ORDER — ESCITALOPRAM OXALATE 10 MG/1
10 TABLET ORAL DAILY
Qty: 90 TABLET | Refills: 1 | Status: SHIPPED | OUTPATIENT
Start: 2024-11-06

## 2024-11-06 NOTE — TELEPHONE ENCOUNTER
Refill Decision Note   Bay Yadav  is requesting a refill authorization.  Brief Assessment and Rationale for Refill:  Approve     Medication Therapy Plan:         Comments:     Note composed:9:54 AM 11/06/2024

## 2024-11-06 NOTE — TELEPHONE ENCOUNTER
No care due was identified.  [facility] Embedded Care Due Messages. Reference number: [id number].   11/06/2024 8:20:30 AM CST

## 2024-11-25 ENCOUNTER — OFFICE VISIT (OUTPATIENT)
Dept: PRIMARY CARE CLINIC | Facility: CLINIC | Age: 74
End: 2024-11-25
Payer: MEDICARE

## 2024-11-25 VITALS
TEMPERATURE: 98 F | DIASTOLIC BLOOD PRESSURE: 88 MMHG | HEIGHT: 67 IN | RESPIRATION RATE: 18 BRPM | SYSTOLIC BLOOD PRESSURE: 138 MMHG | WEIGHT: 174.94 LBS | OXYGEN SATURATION: 98 % | BODY MASS INDEX: 27.46 KG/M2 | HEART RATE: 92 BPM

## 2024-11-25 DIAGNOSIS — M51.369 DDD (DEGENERATIVE DISC DISEASE), LUMBAR: ICD-10-CM

## 2024-11-25 DIAGNOSIS — E78.2 MIXED HYPERLIPIDEMIA: ICD-10-CM

## 2024-11-25 DIAGNOSIS — R73.03 BORDERLINE DIABETES: Primary | ICD-10-CM

## 2024-11-25 DIAGNOSIS — I10 ESSENTIAL HYPERTENSION, BENIGN: ICD-10-CM

## 2024-11-25 PROCEDURE — 1101F PT FALLS ASSESS-DOCD LE1/YR: CPT | Mod: CPTII,S$GLB,, | Performed by: FAMILY MEDICINE

## 2024-11-25 PROCEDURE — 1125F AMNT PAIN NOTED PAIN PRSNT: CPT | Mod: CPTII,S$GLB,, | Performed by: FAMILY MEDICINE

## 2024-11-25 PROCEDURE — 99999 PR PBB SHADOW E&M-EST. PATIENT-LVL IV: CPT | Mod: PBBFAC,,, | Performed by: FAMILY MEDICINE

## 2024-11-25 PROCEDURE — 3288F FALL RISK ASSESSMENT DOCD: CPT | Mod: CPTII,S$GLB,, | Performed by: FAMILY MEDICINE

## 2024-11-25 PROCEDURE — 3044F HG A1C LEVEL LT 7.0%: CPT | Mod: CPTII,S$GLB,, | Performed by: FAMILY MEDICINE

## 2024-11-25 PROCEDURE — 3079F DIAST BP 80-89 MM HG: CPT | Mod: CPTII,S$GLB,, | Performed by: FAMILY MEDICINE

## 2024-11-25 PROCEDURE — 4010F ACE/ARB THERAPY RXD/TAKEN: CPT | Mod: CPTII,S$GLB,, | Performed by: FAMILY MEDICINE

## 2024-11-25 PROCEDURE — G2211 COMPLEX E/M VISIT ADD ON: HCPCS | Mod: S$GLB,,, | Performed by: FAMILY MEDICINE

## 2024-11-25 PROCEDURE — 99214 OFFICE O/P EST MOD 30 MIN: CPT | Mod: S$GLB,,, | Performed by: FAMILY MEDICINE

## 2024-11-25 PROCEDURE — 1160F RVW MEDS BY RX/DR IN RCRD: CPT | Mod: CPTII,S$GLB,, | Performed by: FAMILY MEDICINE

## 2024-11-25 PROCEDURE — 3075F SYST BP GE 130 - 139MM HG: CPT | Mod: CPTII,S$GLB,, | Performed by: FAMILY MEDICINE

## 2024-11-25 PROCEDURE — 3008F BODY MASS INDEX DOCD: CPT | Mod: CPTII,S$GLB,, | Performed by: FAMILY MEDICINE

## 2024-11-25 PROCEDURE — 1159F MED LIST DOCD IN RCRD: CPT | Mod: CPTII,S$GLB,, | Performed by: FAMILY MEDICINE

## 2024-11-25 RX ORDER — TRAMADOL HYDROCHLORIDE 50 MG/1
50 TABLET ORAL EVERY 6 HOURS PRN
Qty: 120 TABLET | Refills: 5 | Status: SHIPPED | OUTPATIENT
Start: 2024-11-25

## 2024-11-25 NOTE — PROGRESS NOTES
Assessment:       1. Borderline diabetes    2. Mixed hyperlipidemia    3. Essential hypertension, benign    4. DDD (degenerative disc disease), lumbar         Plan:       Borderline diabetes  -     Comprehensive Metabolic Panel; Future; Expected date: 05/25/2025  -     Hemoglobin A1C; Future; Expected date: 05/25/2025    Mixed hyperlipidemia  -     Comprehensive Metabolic Panel; Future; Expected date: 05/25/2025  -     Lipid Panel; Future; Expected date: 05/25/2025    Essential hypertension, benign  -     CBC Auto Differential; Future; Expected date: 05/25/2025  -     Comprehensive Metabolic Panel; Future; Expected date: 05/25/2025    DDD (degenerative disc disease), lumbar  -     traMADoL (ULTRAM) 50 mg tablet; Take 1 tablet (50 mg total) by mouth every 6 (six) hours as needed.  Dispense: 120 tablet; Refill: 5      Assessment & Plan    - Reviewed recent labs, including A1C, which was within normal limits  - Assessed ongoing nausea and 1 episode of vomiting, likely related to existing reflux condition  - Evaluated longstanding groin and penile pain, post-prostatectomy  - Considered caregiver stress and concerns about spouse's mental health    GASTRO-ESOPHAGEAL REFLUX DISEASE:   Continued Omeprazole for reflux management.    PAIN MANAGEMENT:   Refilled Tramadol as requested by patient.    GENERAL HEALTH MONITORING:   Ordered comprehensive lab panel for next visit, including complete blood count and kidney function tests.   Complete ordered lab work prior to next visit.   Offered option for spouse to complete lab work even if unable to attend appointment.    FOLLOW-UP:   Follow up in 6 months (mid to late May).       Medication List with Changes/Refills   Current Medications    BACLOFEN (LIORESAL) 10 MG TABLET    Take 1 tablet (10 mg total) by mouth 2 (two) times daily.    BUTALBITAL-ACETAMINOPHEN-CAFFEINE -40 MG (FIORICET, ESGIC) -40 MG PER TABLET    TAKE 1 TABLET BY MOUTH EVERY 6 HOURS AS NEEDED     "ESCITALOPRAM OXALATE (LEXAPRO) 10 MG TABLET    Take 1 tablet (10 mg total) by mouth once daily.    LISINOPRIL 10 MG TABLET    Take 1 tablet (10 mg total) by mouth once daily.    MECLIZINE (ANTIVERT) 25 MG TABLET    TAKE 1 TABLET(25 MG) BY MOUTH THREE TIMES DAILY AS NEEDED    MULTIVITAMIN WITH MINERALS TABLET    Take 1 tablet by mouth once daily.    OMEPRAZOLE (PRILOSEC) 40 MG CAPSULE    Take 1 capsule (40 mg total) by mouth once daily.    PROMETHAZINE (PHENERGAN) 25 MG TABLET    Take 1 tablet (25 mg total) by mouth every 6 (six) hours as needed for Nausea.    ROSUVASTATIN (CRESTOR) 20 MG TABLET    Take 1 tablet (20 mg total) by mouth once daily.    TRAZODONE (DESYREL) 50 MG TABLET    TAKE 1 TO 2 TABLETS(50  MG) BY MOUTH EVERY NIGHT AS NEEDED FOR INSOMNIA   Changed and/or Refilled Medications    Modified Medication Previous Medication    TRAMADOL (ULTRAM) 50 MG TABLET traMADoL (ULTRAM) 50 mg tablet       Take 1 tablet (50 mg total) by mouth every 6 (six) hours as needed.    Take 1 tablet (50 mg total) by mouth every 6 (six) hours as needed.         Subjective:    Patient ID: Bay Yadav is a 73 y.o. male.  Chief Complaint: Follow-up (6 month/reg check up)    HPI  History of Present Illness    CHIEF COMPLAINT:  Patient presents today for a regular checkup.    CAREGIVER STRESS:  He reports significant caregiver stress over the past 3-4 months due to his wife's recent foot surgery. His wife was unable to walk for 1 month following the procedure, requiring him to provide round-the-clock care. He expresses concern about his wife's mental health, noting that she appears withdrawn at times, "looking into space." Her current mental state has impacted her ability to engage in daily activities, including accompanying him to medical appointments.    CHRONIC PAIN:  He reports experiencing severe chronic pain. He denies any recent falls or injuries, chest pain, palpitations, or trouble breathing.    GASTROINTESTINAL " "ISSUES:  He reports experiencing recent nausea, describing it as "a lot." He had a vomiting episode last week, which he attributes to going all day without eating. He continues to take Omeprazole for reflux management. He denies any trouble swallowing or choking spells.    MEDICATIONS:  He continues Omeprazole for reflux management and requests a refill for Tramadol.    PREVENTIVE CARE:  He declines flu vaccine when offered.    SOCIAL HISTORY:  He reports recent travel to [place].      ROS:  ENT: -difficulty swallowing  Respiratory: -shortness of breath  Cardiovascular: -chest pain, -palpitations  Gastrointestinal: +nausea, +vomiting  Musculoskeletal: +joint pain       Review of Systems    Objective:      Vitals:    11/25/24 0820   BP: 138/88   BP Location: Right arm   Patient Position: Sitting   Pulse: 92   Resp: 18   Temp: 97.6 °F (36.4 °C)   TempSrc: Temporal   SpO2: 98%   Weight: 79.3 kg (174 lb 15 oz)   Height: 5' 7" (1.702 m)     BP Readings from Last 5 Encounters:   11/25/24 138/88   05/20/24 124/68   11/20/23 132/68   05/19/23 132/72   11/18/22 118/64     Wt Readings from Last 5 Encounters:   11/25/24 79.3 kg (174 lb 15 oz)   05/20/24 79.8 kg (175 lb 13.1 oz)   11/20/23 78.5 kg (173 lb 1 oz)   05/19/23 83.4 kg (183 lb 15.6 oz)   11/18/22 84.8 kg (186 lb 15.2 oz)     Physical Exam  Physical Exam    General: Well-developed. Well-nourished. No acute distress.  Eyes: EOMI. Sclerae anicteric.  HENT: Normocephalic. Atraumatic. Nares patent. Moist oral mucosa.  Cardiovascular: Regular rate. Regular rhythm. No murmurs. No rubs. No gallops. Normal S1, S2.  Respiratory: Normal respiratory effort. Clear to auscultation bilaterally. No rales. No rhonchi. No wheezing.  Musculoskeletal: No  obvious deformity.  Extremities: No lower extremity edema.  Neurological: Alert & oriented x3. No slurred speech. Normal gait.  Psychiatric: Normal mood. Normal affect. Good insight. Good judgment.  Skin: Warm. Dry. No rash.       "   Lab Results   Component Value Date    WBC 5.81 05/13/2024    HGB 13.3 (L) 05/13/2024    HCT 41.5 05/13/2024     05/13/2024    CHOL 158 05/13/2024    TRIG 150 05/13/2024    HDL 56 05/13/2024    ALT 17 05/13/2024    AST 16 05/13/2024     05/13/2024    K 4.8 05/13/2024     05/13/2024    CREATININE 1.2 05/13/2024    BUN 14 05/13/2024    CO2 25 05/13/2024    INR 1.0 07/21/2006    HGBA1C 5.5 11/18/2024      This note was generated with the assistance of ambient listening technology. Verbal consent was obtained by the patient and accompanying visitor(s) for the recording of patient appointment to facilitate this note. I attest to having reviewed and edited the generated note for accuracy, though some syntax or spelling errors may persist. Please contact the author of this note for any clarification.

## 2025-02-22 DIAGNOSIS — Z00.00 ENCOUNTER FOR MEDICARE ANNUAL WELLNESS EXAM: ICD-10-CM

## 2025-03-10 DIAGNOSIS — G44.219 EPISODIC TENSION-TYPE HEADACHE, NOT INTRACTABLE: ICD-10-CM

## 2025-03-10 NOTE — TELEPHONE ENCOUNTER
Care Due:                  Date            Visit Type   Department     Provider  --------------------------------------------------------------------------------                                EP -                              PRIMARY [facility]  Last Visit: 11-      CARE ([facility])   PRIMARY CARE   Wayne Brennan                              [facility]                              PRIMARY      [facility] BENITEZSMELISSA  Next Visit: 05-      CARE ([facility])   PRIMARY CARE   Wayne Brennan                                                            Last  Test          Frequency    Reason                     Performed    Due Date  --------------------------------------------------------------------------------    CMP.........  12 months..  lisinopriL, rosuvastatin.  05- 05-    Lipid Panel.  12 months..  rosuvastatin.............  05- 05-    Health [facility] Embedded Care Due Messages. Reference number: [id number].   3/10/2025 4:53:08 PM CDT

## 2025-03-11 RX ORDER — BUTALBITAL, ACETAMINOPHEN AND CAFFEINE 50; 325; 40 MG/1; MG/1; MG/1
1 TABLET ORAL EVERY 6 HOURS PRN
Qty: 30 TABLET | Refills: 5 | Status: SHIPPED | OUTPATIENT
Start: 2025-03-11

## 2025-03-26 DIAGNOSIS — M51.369 DDD (DEGENERATIVE DISC DISEASE), LUMBAR: ICD-10-CM

## 2025-03-26 NOTE — TELEPHONE ENCOUNTER
No care due was identified.  [facility] Embedded Care Due Messages. Reference number: [id number].   3/26/2025 2:03:18 AM CDT

## 2025-03-27 RX ORDER — TRAMADOL HYDROCHLORIDE 50 MG/1
50 TABLET ORAL EVERY 6 HOURS PRN
Qty: 120 TABLET | Refills: 5 | Status: SHIPPED | OUTPATIENT
Start: 2025-03-27

## 2025-04-09 ENCOUNTER — TELEPHONE (OUTPATIENT)
Dept: PRIMARY CARE CLINIC | Facility: CLINIC | Age: 75
End: 2025-04-09
Payer: MEDICARE

## 2025-04-09 NOTE — TELEPHONE ENCOUNTER
----- Message from Nancy sent at 4/9/2025  3:08 PM CDT -----  Contact: patient portal message  type: Brianler is requesting to schedule their Lab appointment prior to an appointment.Order is not listed in EPIC.  Please enter order and contact patient to schedule.Preferred Date and Time of Labs: 05/19/2025 8 am Date of Appointment: 05/26/2025 Where would they like the lab performed?[facility] Would the patient rather a call back or a response via [facility]? Portal message

## 2025-05-06 DIAGNOSIS — F32.4 MAJOR DEPRESSIVE DISORDER WITH SINGLE EPISODE, IN PARTIAL REMISSION: ICD-10-CM

## 2025-05-06 RX ORDER — ESCITALOPRAM OXALATE 10 MG/1
10 TABLET ORAL
Qty: 90 TABLET | Refills: 1 | Status: SHIPPED | OUTPATIENT
Start: 2025-05-06

## 2025-05-07 ENCOUNTER — PATIENT MESSAGE (OUTPATIENT)
Dept: PHARMACY | Facility: CLINIC | Age: 75
End: 2025-05-07
Payer: MEDICARE

## 2025-05-07 RX ORDER — ESCITALOPRAM OXALATE 10 MG/1
10 TABLET ORAL DAILY
Qty: 90 TABLET | Refills: 1 | OUTPATIENT
Start: 2025-05-07

## 2025-05-07 NOTE — TELEPHONE ENCOUNTER
Refill Decision Note   Bay Yadav  is requesting a refill authorization.  Brief Assessment and Rationale for Refill:  Approve     Medication Therapy Plan:        Comments:     Note composed:10:59 PM 05/06/2025

## 2025-05-07 NOTE — TELEPHONE ENCOUNTER
No care due was identified.  [facility] Embedded Care Due Messages. Reference number: [id number].   5/06/2025 10:58:20 PM CDT

## 2025-05-07 NOTE — TELEPHONE ENCOUNTER
No care due was identified.  Health [facility] Embedded Care Due Messages. Reference number: [id number].   5/06/2025 10:40:30 PM CDT

## 2025-05-07 NOTE — TELEPHONE ENCOUNTER
Refill Decision Note   Bay Yadav  is requesting a refill authorization.  Brief Assessment and Rationale for Refill:  Quick Discontinue     Medication Therapy Plan:  Receipt confirmed by pharmacy (5/6/2025 10:59 PM CDT)      Medication reconciliation completed: Yes   Comments:     Note composed:7:52 AM 05/07/2025

## 2025-05-15 RX ORDER — PROMETHAZINE HYDROCHLORIDE 25 MG/1
25 TABLET ORAL EVERY 6 HOURS PRN
Qty: 30 TABLET | Refills: 2 | Status: SHIPPED | OUTPATIENT
Start: 2025-05-15

## 2025-05-15 RX ORDER — PROMETHAZINE HYDROCHLORIDE 25 MG/1
25 TABLET ORAL EVERY 6 HOURS PRN
Qty: 30 TABLET | Refills: 2 | OUTPATIENT
Start: 2025-05-15

## 2025-05-15 NOTE — TELEPHONE ENCOUNTER
Refill Decision Note   Bay Yadav  is requesting a refill authorization.  Brief Assessment and Rationale for Refill:  Quick Discontinue     Medication Therapy Plan:  duplicate      Comments:     Note composed:8:38 AM 05/15/2025             Appointments     Last Visit   Visit date not found Justyna Smith, NP   Next Visit   5/15/2025 Justyna Smith, NP

## 2025-05-15 NOTE — TELEPHONE ENCOUNTER
Refill Routing Note   Medication(s) are not appropriate for processing by [facility] for the following reason(s):        Outside of protocol  Non-participating provider    [facility] action(s):  Route               Appointments  past 12m or future 3m with PCP    Date Provider   Last Visit   Visit date not found Justyna Smith NP   Next Visit   Visit date not found Justyna Smith NP   ED visits in past 90 days: 0        Note composed:8:39 AM 05/15/2025

## 2025-05-19 ENCOUNTER — RESULTS FOLLOW-UP (OUTPATIENT)
Dept: PRIMARY CARE CLINIC | Facility: CLINIC | Age: 75
End: 2025-05-19

## 2025-05-26 ENCOUNTER — RESULTS FOLLOW-UP (OUTPATIENT)
Dept: PRIMARY CARE CLINIC | Facility: CLINIC | Age: 75
End: 2025-05-26

## 2025-05-26 ENCOUNTER — OFFICE VISIT (OUTPATIENT)
Dept: PRIMARY CARE CLINIC | Facility: CLINIC | Age: 75
End: 2025-05-26
Payer: MEDICARE

## 2025-05-26 VITALS
HEART RATE: 82 BPM | TEMPERATURE: 98 F | DIASTOLIC BLOOD PRESSURE: 66 MMHG | SYSTOLIC BLOOD PRESSURE: 120 MMHG | RESPIRATION RATE: 16 BRPM | OXYGEN SATURATION: 98 % | HEIGHT: 67 IN | BODY MASS INDEX: 28.08 KG/M2 | WEIGHT: 178.88 LBS

## 2025-05-26 DIAGNOSIS — M51.369 DEGENERATION OF INTERVERTEBRAL DISC OF LUMBAR REGION, UNSPECIFIED WHETHER PAIN PRESENT: ICD-10-CM

## 2025-05-26 DIAGNOSIS — Z85.46 HISTORY OF PROSTATE CANCER: ICD-10-CM

## 2025-05-26 DIAGNOSIS — I10 ESSENTIAL HYPERTENSION, BENIGN: ICD-10-CM

## 2025-05-26 DIAGNOSIS — E78.2 MIXED HYPERLIPIDEMIA: ICD-10-CM

## 2025-05-26 DIAGNOSIS — M51.369 DDD (DEGENERATIVE DISC DISEASE), LUMBAR: Primary | ICD-10-CM

## 2025-05-26 PROCEDURE — 1159F MED LIST DOCD IN RCRD: CPT | Mod: CPTII,S$GLB,, | Performed by: FAMILY MEDICINE

## 2025-05-26 PROCEDURE — 3008F BODY MASS INDEX DOCD: CPT | Mod: CPTII,S$GLB,, | Performed by: FAMILY MEDICINE

## 2025-05-26 PROCEDURE — 1160F RVW MEDS BY RX/DR IN RCRD: CPT | Mod: CPTII,S$GLB,, | Performed by: FAMILY MEDICINE

## 2025-05-26 PROCEDURE — 3044F HG A1C LEVEL LT 7.0%: CPT | Mod: CPTII,S$GLB,, | Performed by: FAMILY MEDICINE

## 2025-05-26 PROCEDURE — 3074F SYST BP LT 130 MM HG: CPT | Mod: CPTII,S$GLB,, | Performed by: FAMILY MEDICINE

## 2025-05-26 PROCEDURE — 99999 PR PBB SHADOW E&M-EST. PATIENT-LVL IV: CPT | Mod: PBBFAC,,, | Performed by: FAMILY MEDICINE

## 2025-05-26 PROCEDURE — 99214 OFFICE O/P EST MOD 30 MIN: CPT | Mod: S$GLB,,, | Performed by: FAMILY MEDICINE

## 2025-05-26 PROCEDURE — 1101F PT FALLS ASSESS-DOCD LE1/YR: CPT | Mod: CPTII,S$GLB,, | Performed by: FAMILY MEDICINE

## 2025-05-26 PROCEDURE — 4010F ACE/ARB THERAPY RXD/TAKEN: CPT | Mod: CPTII,S$GLB,, | Performed by: FAMILY MEDICINE

## 2025-05-26 PROCEDURE — 1125F AMNT PAIN NOTED PAIN PRSNT: CPT | Mod: CPTII,S$GLB,, | Performed by: FAMILY MEDICINE

## 2025-05-26 PROCEDURE — 3078F DIAST BP <80 MM HG: CPT | Mod: CPTII,S$GLB,, | Performed by: FAMILY MEDICINE

## 2025-05-26 PROCEDURE — 3288F FALL RISK ASSESSMENT DOCD: CPT | Mod: CPTII,S$GLB,, | Performed by: FAMILY MEDICINE

## 2025-05-26 RX ORDER — TRAMADOL HYDROCHLORIDE 50 MG/1
50 TABLET, FILM COATED ORAL EVERY 6 HOURS PRN
Qty: 120 TABLET | Refills: 5 | Status: SHIPPED | OUTPATIENT
Start: 2025-05-26

## 2025-05-26 NOTE — PROGRESS NOTES
"Assessment:       1. DDD (degenerative disc disease), lumbar    2. Degeneration of intervertebral disc of lumbar region, unspecified whether pain present    3. Essential hypertension, benign    4. Mixed hyperlipidemia    5. History of prostate cancer         Plan:       DDD (degenerative disc disease), lumbar  -     traMADoL (ULTRAM) 50 mg tablet; Take 1 tablet (50 mg total) by mouth every 6 (six) hours as needed.  Dispense: 120 tablet; Refill: 5    Degeneration of intervertebral disc of lumbar region, unspecified whether pain present    Essential hypertension, benign    Mixed hyperlipidemia    History of prostate cancer  -     Prostate Specific Antigen, Diagnostic; Future; Expected date: 05/26/2025      Assessment & Plan    - Assessed knee pain, suspecting arthritis due to crepitus observed during exam.  - Considered potential benefits of cortisone injection for knee pain relief.  - Noted low PSA, consistent with history of prostatectomy.  - Evaluated need for ongoing PSA monitoring.  - Considered colonoscopy timing based on age and previous findings.    LEFT KNEE PAIN:   Patient reports severe, "unbelievable" right knee pain ongoing for over 1 month that almost causes falls.   Observed crepitus suggesting arthritis.   Recommend orthopedic consultation if pain persists.   Discussed potential cortisone injection which might provide 6-8 months of relief for arthritis-related pain.    LOW BACK PAIN:   Patient reports struggling with severe back pain for 20 years.    PELVIC AND PERINEAL PAIN:   Patient experiences daily groin and penile pain that has persisted since before prostatectomy.    TINNITUS:   Patient reports ringing in the ears causing significant distress.    ACQUIRED ABSENCE OF PROSTATE:   History of prostatectomy.   Noted PSA test was not included in recent labs.   Ordered PSA test to be completed before upcoming urology appointment on the 6th.    FOLLOW-UP:   Follow up at the end of the year for " colonoscopy.   Continued prescription sent to [facility] in [place] with 120 tablets and 5 refills.       Medication List with Changes/Refills   Current Medications    BACLOFEN (LIORESAL) 10 MG TABLET    Take 1 tablet (10 mg total) by mouth 2 (two) times daily.    BUTALBITAL-ACETAMINOPHEN-CAFFEINE -40 MG (FIORICET, ESGIC) -40 MG PER TABLET    Take 1 tablet by mouth every 6 (six) hours as needed.    ESCITALOPRAM OXALATE (LEXAPRO) 10 MG TABLET    TAKE 1 TABLET(10 MG) BY MOUTH DAILY    LISINOPRIL 10 MG TABLET    Take 1 tablet (10 mg total) by mouth once daily.    MECLIZINE (ANTIVERT) 25 MG TABLET    TAKE 1 TABLET(25 MG) BY MOUTH THREE TIMES DAILY AS NEEDED    MULTIVITAMIN WITH MINERALS TABLET    Take 1 tablet by mouth once daily.    OMEPRAZOLE (PRILOSEC) 40 MG CAPSULE    Take 1 capsule (40 mg total) by mouth once daily.    PROMETHAZINE (PHENERGAN) 25 MG TABLET    Take 1 tablet (25 mg total) by mouth every 6 (six) hours as needed for Nausea.    ROSUVASTATIN (CRESTOR) 20 MG TABLET    Take 1 tablet (20 mg total) by mouth once daily.    TRAZODONE (DESYREL) 50 MG TABLET    TAKE 1 TO 2 TABLETS(50  MG) BY MOUTH EVERY NIGHT AS NEEDED FOR INSOMNIA   Changed and/or Refilled Medications    Modified Medication Previous Medication    TRAMADOL (ULTRAM) 50 MG TABLET traMADoL (ULTRAM) 50 mg tablet       Take 1 tablet (50 mg total) by mouth every 6 (six) hours as needed.    Take 1 tablet (50 mg total) by mouth every 6 (six) hours as needed.         Subjective:    Patient ID: Bay Yadav is a 74 y.o. male.  Chief Complaint: Follow-up (6 month reg check up. )    HPI  History of Present Illness    CHIEF COMPLAINT:  Patient presents today with knee pain.    MUSCULOSKELETAL:  He reports severe knee pain present for over a month that is significant enough to nearly cause falls. He is currently using Biofreeze for symptom management and denies any prior knee injections or joint drainage procedures. He experiences  "intermittent severe shoulder pain causing functional limitations, particularly when lifting objects. He has a 20-year history of back pain which has recently become particularly severe.    SURGICAL HISTORY:  History of prostatectomy and bilateral shoulder cortisone injections.    GENITOURINARY:  He reports daily groin and penile pain that has persisted both before and after prostatectomy.    ENT:  He reports severe and significantly bothersome tinnitus.      ROS:  ENT: +tinnitus  Genitourinary: +flank pain  Male Genitourinary: +penile pain, +genital sores or pain  Musculoskeletal: +joint pain, +back pain, +limb pain, +pain with movement       Review of Systems    Objective:      Vitals:    05/26/25 0843   BP: 120/66   BP Location: Left arm   Patient Position: Sitting   Pulse: 82   Resp: 16   Temp: 98.1 °F (36.7 °C)   TempSrc: Oral   SpO2: 98%   Weight: 81.1 kg (178 lb 14.5 oz)   Height: 5' 7" (1.702 m)     BP Readings from Last 5 Encounters:   05/26/25 120/66   11/25/24 138/88   05/20/24 124/68   11/20/23 132/68   05/19/23 132/72     Wt Readings from Last 5 Encounters:   05/26/25 81.1 kg (178 lb 14.5 oz)   11/25/24 79.3 kg (174 lb 15 oz)   05/20/24 79.8 kg (175 lb 13.1 oz)   11/20/23 78.5 kg (173 lb 1 oz)   05/19/23 83.4 kg (183 lb 15.6 oz)     Physical Exam  Physical Exam    General: Well-developed. Well-nourished. No acute distress.  Eyes: EOMI. Sclerae anicteric.  HENT: Normocephalic. Atraumatic. Nares patent. Moist oral mucosa.  Cardiovascular: Regular rate. Regular rhythm. No murmurs. No rubs. No gallops. Normal S1, S2.  Respiratory: Normal respiratory effort. Clear to auscultation bilaterally. No rales. No rhonchi. No wheezing.  Musculoskeletal: No  obvious deformity. Crepitus in knee.  Extremities: No lower extremity edema.  Neurological: Alert & oriented x3. No slurred speech. Normal gait.  Psychiatric: Normal mood. Normal affect. Good insight. Good judgment.  Skin: Warm. Dry. No rash.         Lab Results "   Component Value Date    WBC 7.49 05/19/2025    HGB 14.1 05/19/2025    HCT 43.3 05/19/2025     05/19/2025    CHOL 165 05/19/2025    TRIG 203 (H) 05/19/2025    HDL 54 05/19/2025    ALT 36 05/19/2025    AST 25 05/19/2025     05/19/2025    K 4.5 05/19/2025     05/19/2025    CREATININE 1.2 05/19/2025    BUN 11 05/19/2025    CO2 28 05/19/2025    INR 1.0 07/21/2006    HGBA1C 5.5 05/19/2025      This note was generated with the assistance of ambient listening technology. Verbal consent was obtained by the patient and accompanying visitor(s) for the recording of patient appointment to facilitate this note. I attest to having reviewed and edited the generated note for accuracy, though some syntax or spelling errors may persist. Please contact the author of this note for any clarification.

## 2025-06-06 ENCOUNTER — OFFICE VISIT (OUTPATIENT)
Dept: UROLOGY | Facility: CLINIC | Age: 75
End: 2025-06-06
Payer: MEDICARE

## 2025-06-06 VITALS
BODY MASS INDEX: 28 KG/M2 | HEIGHT: 67 IN | HEART RATE: 84 BPM | WEIGHT: 178.38 LBS | SYSTOLIC BLOOD PRESSURE: 148 MMHG | DIASTOLIC BLOOD PRESSURE: 80 MMHG

## 2025-06-06 DIAGNOSIS — G89.29 CHRONIC PAIN IN PENIS: ICD-10-CM

## 2025-06-06 DIAGNOSIS — N48.6 PEYRONIE DISEASE: ICD-10-CM

## 2025-06-06 DIAGNOSIS — Z90.79 HISTORY OF ROBOT-ASSISTED LAPAROSCOPIC RADICAL PROSTATECTOMY: ICD-10-CM

## 2025-06-06 DIAGNOSIS — N48.89 CHRONIC PAIN IN PENIS: ICD-10-CM

## 2025-06-06 DIAGNOSIS — N39.3 SUI (STRESS URINARY INCONTINENCE), MALE: ICD-10-CM

## 2025-06-06 DIAGNOSIS — C61 PROSTATE CANCER: Primary | ICD-10-CM

## 2025-06-06 PROCEDURE — 99999 PR PBB SHADOW E&M-EST. PATIENT-LVL III: CPT | Mod: PBBFAC,,, | Performed by: NURSE PRACTITIONER

## 2025-06-06 RX ORDER — DULOXETIN HYDROCHLORIDE 30 MG/1
30 CAPSULE, DELAYED RELEASE ORAL DAILY
Qty: 30 CAPSULE | Refills: 2 | Status: SHIPPED | OUTPATIENT
Start: 2025-06-06 | End: 2026-06-06

## 2025-06-10 DIAGNOSIS — K21.9 GASTROESOPHAGEAL REFLUX DISEASE: ICD-10-CM

## 2025-06-10 RX ORDER — OMEPRAZOLE 40 MG/1
CAPSULE, DELAYED RELEASE ORAL
Qty: 90 CAPSULE | Refills: 3 | Status: SHIPPED | OUTPATIENT
Start: 2025-06-10

## 2025-06-10 NOTE — TELEPHONE ENCOUNTER
No care due was identified.  [facility] Embedded Care Due Messages. Reference number: [id number].   6/10/2025 8:09:03 AM CDT

## 2025-06-10 NOTE — TELEPHONE ENCOUNTER
Refill Decision Note   Bay Yadav  is requesting a refill authorization.  Brief Assessment and Rationale for Refill:  Approve     Medication Therapy Plan:         Comments:     Note composed:10:45 AM 06/10/2025

## 2025-07-20 DIAGNOSIS — G44.219 EPISODIC TENSION-TYPE HEADACHE, NOT INTRACTABLE: ICD-10-CM

## 2025-07-20 DIAGNOSIS — I10 ESSENTIAL HYPERTENSION, BENIGN: ICD-10-CM

## 2025-07-20 DIAGNOSIS — E78.2 MIXED HYPERLIPIDEMIA: ICD-10-CM

## 2025-07-20 NOTE — TELEPHONE ENCOUNTER
No care due was identified.  [facility] Embedded Care Due Messages. Reference number: [id number].   7/20/2025 7:50:43 AM CDT

## 2025-07-21 RX ORDER — BUTALBITAL, ACETAMINOPHEN AND CAFFEINE 50; 325; 40 MG/1; MG/1; MG/1
1 TABLET ORAL EVERY 6 HOURS PRN
Qty: 30 TABLET | Refills: 5 | Status: SHIPPED | OUTPATIENT
Start: 2025-07-21

## 2025-07-21 RX ORDER — ROSUVASTATIN CALCIUM 20 MG/1
20 TABLET, COATED ORAL DAILY
Qty: 90 TABLET | Refills: 3 | Status: SHIPPED | OUTPATIENT
Start: 2025-07-21

## 2025-07-21 RX ORDER — LISINOPRIL 10 MG/1
10 TABLET ORAL DAILY
Qty: 90 TABLET | Refills: 3 | Status: SHIPPED | OUTPATIENT
Start: 2025-07-21

## (undated) DEVICE — Device

## (undated) DEVICE — SUT ABS CLIP LAPRA-TY CTD

## (undated) DEVICE — SUT MONOCRYL 3-0 RB1

## (undated) DEVICE — SOL WATER STRL IRR 1000ML

## (undated) DEVICE — DRAPE SCOPE PILLOW WARMER

## (undated) DEVICE — DRAPE COLUMN DAVINCI XI

## (undated) DEVICE — SOL ELECTROLUBE ANTI-STIC

## (undated) DEVICE — SCISSOR 5MMX35CM DIRECT DRIVE

## (undated) DEVICE — SEAL UNIVERSAL 5MM-8MM XI

## (undated) DEVICE — TRAY FOLEY 16FR INFECTION CONT

## (undated) DEVICE — LUBRICANT SURGILUBE 2 OZ

## (undated) DEVICE — CLIP HEMO-LOK MLX LARGE LF

## (undated) DEVICE — SOL NS 1000CC

## (undated) DEVICE — DRAPE ABDOMINAL TIBURON 14X11

## (undated) DEVICE — COVER TIP CURVED SCISSORS XI

## (undated) DEVICE — SUT 2/0 36IN COATED VICRYL

## (undated) DEVICE — EVACUATOR WOUND BULB 100CC

## (undated) DEVICE — DRAIN CHANNEL ROUND 10FR

## (undated) DEVICE — SUT MONOCRYL 3-0 UNDYED RB1

## (undated) DEVICE — TRAY MINOR GEN SURG

## (undated) DEVICE — ELECTRODE REM PLYHSV RETURN 9

## (undated) DEVICE — LEGGINGS 48X31 INCH

## (undated) DEVICE — ADHESIVE DERMABOND ADVANCED

## (undated) DEVICE — TROCAR ENDOPATH XCEL 5MM 7.5CM

## (undated) DEVICE — SUT MCRYL PLUS 4-0 PS2 27IN

## (undated) DEVICE — COVER LIGHT HANDLE

## (undated) DEVICE — SUT PDS II 0 CT-1 VIL MONO

## (undated) DEVICE — PORT AIRSEAL 12/120MM LPI

## (undated) DEVICE — NDL INSUF ULTRA VERESS 120MM

## (undated) DEVICE — SYR 50ML CATH TIP

## (undated) DEVICE — SET TRI-LUMEN FILTERED TUBE

## (undated) DEVICE — SOL CLEARIFY VISUALIZATION LAP

## (undated) DEVICE — BAG TISS RETRV MONARCH 10MM

## (undated) DEVICE — IRRIGATOR ENDOSCOPY DISP.

## (undated) DEVICE — DRAPE ARM DAVINCI XI

## (undated) DEVICE — GOWN SURGICAL X-LARGE